# Patient Record
Sex: FEMALE | Race: WHITE | Employment: FULL TIME | URBAN - NONMETROPOLITAN AREA
[De-identification: names, ages, dates, MRNs, and addresses within clinical notes are randomized per-mention and may not be internally consistent; named-entity substitution may affect disease eponyms.]

---

## 2017-08-11 ENCOUNTER — HOSPITAL ENCOUNTER (OUTPATIENT)
Age: 42
Discharge: HOME | End: 2017-08-11
Payer: COMMERCIAL

## 2017-08-11 LAB
ALBUMIN SERPL-MCNC: 4.1 G/DL (ref 3.5–5.1)
ALP BLD-CCNC: 80 U/L (ref 38–126)
ALT SERPL-CCNC: 39 U/L (ref 11–66)
ANION GAP SERPL CALCULATED.3IONS-SCNC: 12 MEQ/L (ref 8–16)
AST SERPL-CCNC: 23 U/L (ref 5–40)
AVERAGE GLUCOSE: 129 MG/DL (ref 70–126)
BASOPHILS # BLD: 0.6 %
BASOPHILS ABSOLUTE: 0.1 THOU/MM3 (ref 0–0.1)
BILIRUB SERPL-MCNC: 0.5 MG/DL (ref 0.3–1.2)
BILIRUBIN DIRECT: < 0.2 MG/DL (ref 0–0.3)
BUN BLDV-MCNC: 14 MG/DL (ref 7–22)
CALCIUM SERPL-MCNC: 9 MG/DL (ref 8.5–10.5)
CHLORIDE BLD-SCNC: 101 MEQ/L (ref 98–111)
CHOLESTEROL, TOTAL: 230 MG/DL (ref 100–199)
CO2: 27 MEQ/L (ref 23–33)
CREAT SERPL-MCNC: 0.7 MG/DL (ref 0.4–1.2)
EOSINOPHIL # BLD: 1 %
EOSINOPHILS ABSOLUTE: 0.1 THOU/MM3 (ref 0–0.4)
GFR SERPL CREATININE-BSD FRML MDRD: > 90 ML/MIN/1.73M2
GLUCOSE BLD-MCNC: 141 MG/DL (ref 70–108)
HBA1C MFR BLD: 6.3 % (ref 4.4–6.4)
HCT VFR BLD CALC: 39.4 % (ref 37–47)
HDLC SERPL-MCNC: 40 MG/DL
HEMOGLOBIN: 13.6 GM/DL (ref 12–16)
LDL CHOLESTEROL CALCULATED: 128 MG/DL
LYMPHOCYTES # BLD: 33.2 %
LYMPHOCYTES ABSOLUTE: 3 THOU/MM3 (ref 1–4.8)
MCH RBC QN AUTO: 31 PG (ref 27–31)
MCHC RBC AUTO-ENTMCNC: 34.6 GM/DL (ref 33–37)
MCV RBC AUTO: 89.6 FL (ref 81–99)
MONOCYTES # BLD: 5.2 %
MONOCYTES ABSOLUTE: 0.5 THOU/MM3 (ref 0.4–1.3)
NUCLEATED RED BLOOD CELLS: 0 /100 WBC
PDW BLD-RTO: 12.2 % (ref 11.5–14.5)
PLATELET # BLD: 272 THOU/MM3 (ref 130–400)
PMV BLD AUTO: 10.1 MCM (ref 7.4–10.4)
POTASSIUM SERPL-SCNC: 4.2 MEQ/L (ref 3.5–5.2)
RBC # BLD: 4.4 MILL/MM3 (ref 4.2–5.4)
RBC # BLD: NORMAL 10*6/UL
SEG NEUTROPHILS: 60 %
SEGMENTED NEUTROPHILS ABSOLUTE COUNT: 5.3 THOU/MM3 (ref 1.8–7.7)
SODIUM BLD-SCNC: 140 MEQ/L (ref 135–145)
TOTAL PROTEIN: 7.4 G/DL (ref 6.1–8)
TRIGL SERPL-MCNC: 310 MG/DL (ref 0–199)
TSH SERPL DL<=0.05 MIU/L-ACNC: 3.1 UIU/ML (ref 0.4–4.2)
WBC # BLD: 8.9 THOU/MM3 (ref 4.8–10.8)

## 2017-08-11 PROCEDURE — 84443 ASSAY THYROID STIM HORMONE: CPT

## 2017-08-11 PROCEDURE — 36415 COLL VENOUS BLD VENIPUNCTURE: CPT

## 2017-08-11 PROCEDURE — 83036 HEMOGLOBIN GLYCOSYLATED A1C: CPT

## 2017-08-11 PROCEDURE — 82306 VITAMIN D 25 HYDROXY: CPT

## 2017-08-11 PROCEDURE — 80061 LIPID PANEL: CPT

## 2017-08-11 PROCEDURE — 80053 COMPREHEN METABOLIC PANEL: CPT

## 2017-08-11 PROCEDURE — 85025 COMPLETE CBC W/AUTO DIFF WBC: CPT

## 2017-08-11 PROCEDURE — 82248 BILIRUBIN DIRECT: CPT

## 2017-08-15 LAB — VITAMIN D2 AND D3, TOTAL: NORMAL

## 2017-12-02 ENCOUNTER — HOSPITAL ENCOUNTER (OUTPATIENT)
Age: 42
Discharge: HOME | End: 2017-12-02
Payer: COMMERCIAL

## 2017-12-02 LAB
BASOPHILS # BLD: 0.5 %
BASOPHILS ABSOLUTE: 0 THOU/MM3 (ref 0–0.1)
EKG ATRIAL RATE: 69 BPM
EKG P AXIS: 55 DEGREES
EKG P-R INTERVAL: 160 MS
EKG Q-T INTERVAL: 416 MS
EKG QRS DURATION: 82 MS
EKG QTC CALCULATION (BAZETT): 445 MS
EKG R AXIS: 74 DEGREES
EKG T AXIS: 22 DEGREES
EKG VENTRICULAR RATE: 69 BPM
EOSINOPHIL # BLD: 1.5 %
EOSINOPHILS ABSOLUTE: 0.1 THOU/MM3 (ref 0–0.4)
HCT VFR BLD CALC: 41.4 % (ref 37–47)
HEMOGLOBIN: 14.2 GM/DL (ref 12–16)
LYMPHOCYTES # BLD: 29.6 %
LYMPHOCYTES ABSOLUTE: 2.4 THOU/MM3 (ref 1–4.8)
MCH RBC QN AUTO: 30.9 PG (ref 27–31)
MCHC RBC AUTO-ENTMCNC: 34.3 GM/DL (ref 33–37)
MCV RBC AUTO: 90.1 FL (ref 81–99)
MONOCYTES # BLD: 5 %
MONOCYTES ABSOLUTE: 0.4 THOU/MM3 (ref 0.4–1.3)
NUCLEATED RED BLOOD CELLS: 0 /100 WBC
PDW BLD-RTO: 12.6 % (ref 11.5–14.5)
PLATELET # BLD: 270 THOU/MM3 (ref 130–400)
PMV BLD AUTO: 9.9 MCM (ref 7.4–10.4)
POTASSIUM SERPL-SCNC: 4.1 MEQ/L (ref 3.5–5.2)
RBC # BLD: 4.59 MILL/MM3 (ref 4.2–5.4)
SEG NEUTROPHILS: 63.4 %
SEGMENTED NEUTROPHILS ABSOLUTE COUNT: 5.1 THOU/MM3 (ref 1.8–7.7)
WBC # BLD: 8.1 THOU/MM3 (ref 4.8–10.8)

## 2017-12-02 PROCEDURE — 85025 COMPLETE CBC W/AUTO DIFF WBC: CPT

## 2017-12-02 PROCEDURE — 36415 COLL VENOUS BLD VENIPUNCTURE: CPT

## 2017-12-02 PROCEDURE — 93005 ELECTROCARDIOGRAM TRACING: CPT

## 2017-12-02 PROCEDURE — 84132 ASSAY OF SERUM POTASSIUM: CPT

## 2017-12-09 ENCOUNTER — HOSPITAL ENCOUNTER (EMERGENCY)
Age: 42
Discharge: HOME | End: 2017-12-09
Payer: COMMERCIAL

## 2017-12-09 VITALS
HEART RATE: 90 BPM | OXYGEN SATURATION: 97 % | SYSTOLIC BLOOD PRESSURE: 140 MMHG | DIASTOLIC BLOOD PRESSURE: 90 MMHG | HEIGHT: 65 IN | RESPIRATION RATE: 16 BRPM | WEIGHT: 293 LBS | TEMPERATURE: 97.9 F | BODY MASS INDEX: 48.82 KG/M2

## 2017-12-09 DIAGNOSIS — R31.9 HEMATURIA, UNSPECIFIED TYPE: ICD-10-CM

## 2017-12-09 DIAGNOSIS — R30.0 DYSURIA: Primary | ICD-10-CM

## 2017-12-09 LAB
BILIRUBIN URINE: NEGATIVE
BLOOD, URINE: ABNORMAL
CHARACTER, URINE: CLEAR
COLOR: ABNORMAL
GLUCOSE, URINE: NEGATIVE MG/DL
KETONES, URINE: NEGATIVE
LEUKOCYTES, UA: ABNORMAL
NITRATE, UA: NEGATIVE
PH UA: 6 (ref 5–9)
PROTEIN UA: NEGATIVE MG/DL
REFLEX TO URINE C & S: ABNORMAL
SPECIFIC GRAVITY UA: <= 1.005 (ref 1–1.03)
UROBILINOGEN, URINE: 0.2 EU/DL (ref 0–1)

## 2017-12-09 PROCEDURE — 99213 OFFICE O/P EST LOW 20 MIN: CPT | Performed by: NURSE PRACTITIONER

## 2017-12-09 PROCEDURE — 81003 URINALYSIS AUTO W/O SCOPE: CPT

## 2017-12-09 PROCEDURE — 99214 OFFICE O/P EST MOD 30 MIN: CPT

## 2017-12-09 PROCEDURE — 87086 URINE CULTURE/COLONY COUNT: CPT

## 2017-12-09 RX ORDER — NITROFURANTOIN 25; 75 MG/1; MG/1
100 CAPSULE ORAL 2 TIMES DAILY
Qty: 10 CAPSULE | Refills: 0 | Status: SHIPPED | OUTPATIENT
Start: 2017-12-09 | End: 2017-12-14

## 2017-12-09 ASSESSMENT — PAIN DESCRIPTION - ORIENTATION: ORIENTATION: LOWER

## 2017-12-09 ASSESSMENT — PAIN DESCRIPTION - PAIN TYPE: TYPE: ACUTE PAIN

## 2017-12-09 ASSESSMENT — ENCOUNTER SYMPTOMS
ABDOMINAL PAIN: 0
NAUSEA: 0
DIARRHEA: 0
COUGH: 0
VOMITING: 0

## 2017-12-09 ASSESSMENT — PAIN SCALES - GENERAL: PAINLEVEL_OUTOF10: 2

## 2017-12-09 ASSESSMENT — PAIN DESCRIPTION - LOCATION: LOCATION: ABDOMEN

## 2017-12-09 ASSESSMENT — PAIN DESCRIPTION - DESCRIPTORS: DESCRIPTORS: CRAMPING

## 2017-12-09 NOTE — ED PROVIDER NOTES
daily    ONDANSETRON (ZOFRAN ODT) 4 MG DISINTEGRATING TABLET    Take 1 tablet by mouth every 8 hours as needed for Nausea    OREGANO PO    Take by mouth       ALLERGIES     Patient is has No Known Allergies. FAMILY HISTORY     Patient's family history includes Heart Disease in her father. SOCIAL HISTORY     Patient  reports that she has never smoked. She has never used smokeless tobacco. She reports that she does not drink alcohol or use drugs. PHYSICAL EXAM     ED TRIAGE VITALS  BP: (!) 185/86, Temp: 97.9 °F (36.6 °C), Pulse: 85, Resp: 18, SpO2: 97 %  Physical Exam   Constitutional: She is oriented to person, place, and time. Vital signs are normal. She appears well-developed and well-nourished. Cardiovascular: Normal rate, regular rhythm and normal heart sounds. Pulmonary/Chest: Effort normal and breath sounds normal.   Abdominal: Soft. Normal appearance. There is no tenderness. There is no CVA tenderness. Neurological: She is alert and oriented to person, place, and time. Skin: Skin is warm and dry. Nursing note and vitals reviewed.       DIAGNOSTIC RESULTS   Labs:  Results for orders placed or performed during the hospital encounter of 12/09/17   UA without Microscopic Reflex C&S   Result Value Ref Range    Glucose, Urine Negative NEGATIVE mg/dl    Bilirubin Urine Negative NEGATIVE    Ketones, Urine Negative NEGATIVE    Specific Gravity, UA <=1.005 1.002 - 1.03    Blood, Urine Trace-lysed NEGATIVE    pH, UA 6.00 5.0 - 9.0    Protein, UA Negative NEGATIVE mg/dl    Urobilinogen, Urine 0.20 0.0 - 1.0 eu/dl    Nitrate, UA Negative NEGATIVE    LEUKOCYTES, UA Small (A) NEGATIVE    Color, UA STRAW STRAW-YELL    Character, Urine Clear CLEAR-SL C    REFLEX TO URINE C & S INDICATED        IMAGING:    URGENT CARE COURSE:     Vitals:    12/09/17 1753   BP: (!) 185/86   Pulse: 85   Resp: 18   Temp: 97.9 °F (36.6 °C)   TempSrc: Temporal   SpO2: 97%   Weight: (!) 336 lb (152.4 kg)   Height: 5' 5\" (1.651 m) Medications - No data to display  PROCEDURES:  None  FINAL IMPRESSION      1. Dysuria    2. Hematuria, unspecified type        DISPOSITION/PLAN   DISPOSITION     Patient will be discharged home. She is to increase fluids and rest.  Avoid caffeine. No bubble baths. Urinate after intercourse. Since patient has an upcoming surgery and is symptomatic for possible UTI, she will be started on Macrobid to be taken as prescribed. All lab results were reviewed in detail. Reviewed signs and symptoms that require immediate emergency room evaluation and treatment. Patient voiced understanding of all information and is agreeable to the treatment plan.     PATIENT REFERRED TO:  Julia Jaquez  [address] [place] [address]  [phone]    Call in 2 days  As needed, If symptoms worsen go to emergency room    DISCHARGE MEDICATIONS:  New Prescriptions    NITROFURANTOIN, MACROCRYSTAL-MONOHYDRATE, (MACROBID) 100 MG CAPSULE    Take 1 capsule by mouth 2 times daily for 5 days     Current Discharge Medication List          [place], [address]  12/09/17 1956

## 2017-12-10 LAB
ORGANISM: ABNORMAL
URINE CULTURE REFLEX: ABNORMAL

## 2017-12-11 RX ORDER — LOSARTAN POTASSIUM AND HYDROCHLOROTHIAZIDE 12.5; 5 MG/1; MG/1
1 TABLET ORAL DAILY
COMMUNITY
End: 2019-01-05 | Stop reason: ALTCHOICE

## 2017-12-11 NOTE — H&P
[address] [address]  History and Physical Update    Pt Name: Sergio Mann  MRN: [id number]  YOB: 1975  Date of evaluation: 12/11/2017    [x] I have examined the patient and reviewed the H&P/Consult and there are no changes to the patient or plans. [] I have examined the patient and reviewed the H&P/Consult and have noted the following changes: For scheduled inductions of labor, please refer to the scheduling sheet for any maternal and / or fetal indications for the induction. They are not being placed here to avoid possible discrepancies and duplications in the medical record. Thank you. This will be/was done the day of admission/surgery in the pre op holding area or in L and D as applicable to this patient.         Og Jules MD  Electronically signed 12/11/2017 at 8:46 AM

## 2017-12-12 ENCOUNTER — ANESTHESIA (OUTPATIENT)
Dept: OPERATING ROOM | Age: 42
End: 2017-12-12
Payer: COMMERCIAL

## 2017-12-12 ENCOUNTER — HOSPITAL ENCOUNTER (OUTPATIENT)
Age: 42
Setting detail: OUTPATIENT SURGERY
Discharge: HOME | End: 2017-12-12
Attending: OBSTETRICS & GYNECOLOGY | Admitting: OBSTETRICS & GYNECOLOGY
Payer: COMMERCIAL

## 2017-12-12 ENCOUNTER — ANESTHESIA EVENT (OUTPATIENT)
Dept: OPERATING ROOM | Age: 42
End: 2017-12-12
Payer: COMMERCIAL

## 2017-12-12 VITALS
SYSTOLIC BLOOD PRESSURE: 87 MMHG | OXYGEN SATURATION: 97 % | RESPIRATION RATE: 7 BRPM | TEMPERATURE: 14.4 F | DIASTOLIC BLOOD PRESSURE: 48 MMHG

## 2017-12-12 VITALS
TEMPERATURE: 97 F | WEIGHT: 293 LBS | BODY MASS INDEX: 47.09 KG/M2 | HEIGHT: 66 IN | OXYGEN SATURATION: 98 % | RESPIRATION RATE: 18 BRPM | DIASTOLIC BLOOD PRESSURE: 78 MMHG | HEART RATE: 66 BPM | SYSTOLIC BLOOD PRESSURE: 146 MMHG

## 2017-12-12 PROBLEM — N92.1 MENORRHAGIA WITH IRREGULAR CYCLE: Status: ACTIVE | Noted: 2017-12-12

## 2017-12-12 PROBLEM — N94.6 DYSMENORRHEA: Status: ACTIVE | Noted: 2017-12-12

## 2017-12-12 PROBLEM — E66.01 MORBID OBESITY DUE TO EXCESS CALORIES (HCC): Status: ACTIVE | Noted: 2017-12-12

## 2017-12-12 LAB
ABO: NORMAL
ANTIBODY SCREEN: NORMAL
RH FACTOR: NORMAL

## 2017-12-12 PROCEDURE — A4649 SURGICAL SUPPLIES: HCPCS | Performed by: OBSTETRICS & GYNECOLOGY

## 2017-12-12 PROCEDURE — 6370000000 HC RX 637 (ALT 250 FOR IP)

## 2017-12-12 PROCEDURE — 3700000001 HC ADD 15 MINUTES (ANESTHESIA): Performed by: OBSTETRICS & GYNECOLOGY

## 2017-12-12 PROCEDURE — 88307 TISSUE EXAM BY PATHOLOGIST: CPT

## 2017-12-12 PROCEDURE — 2580000003 HC RX 258: Performed by: OBSTETRICS & GYNECOLOGY

## 2017-12-12 PROCEDURE — 2500000003 HC RX 250 WO HCPCS: Performed by: OBSTETRICS & GYNECOLOGY

## 2017-12-12 PROCEDURE — 3600000009 HC SURGERY ROBOT BASE: Performed by: OBSTETRICS & GYNECOLOGY

## 2017-12-12 PROCEDURE — A6413 ADHESIVE BANDAGE, FIRST-AID: HCPCS | Performed by: OBSTETRICS & GYNECOLOGY

## 2017-12-12 PROCEDURE — 6360000002 HC RX W HCPCS: Performed by: OBSTETRICS & GYNECOLOGY

## 2017-12-12 PROCEDURE — 2780000010 HC IMPLANT OTHER: Performed by: OBSTETRICS & GYNECOLOGY

## 2017-12-12 PROCEDURE — 6360000002 HC RX W HCPCS: Performed by: ANESTHESIOLOGY

## 2017-12-12 PROCEDURE — 7100000001 HC PACU RECOVERY - ADDTL 15 MIN: Performed by: OBSTETRICS & GYNECOLOGY

## 2017-12-12 PROCEDURE — 2500000003 HC RX 250 WO HCPCS: Performed by: ANESTHESIOLOGY

## 2017-12-12 PROCEDURE — 7100000000 HC PACU RECOVERY - FIRST 15 MIN: Performed by: OBSTETRICS & GYNECOLOGY

## 2017-12-12 PROCEDURE — 3600000019 HC SURGERY ROBOT ADDTL 15MIN: Performed by: OBSTETRICS & GYNECOLOGY

## 2017-12-12 PROCEDURE — 7100000011 HC PHASE II RECOVERY - ADDTL 15 MIN: Performed by: OBSTETRICS & GYNECOLOGY

## 2017-12-12 PROCEDURE — 86900 BLOOD TYPING SEROLOGIC ABO: CPT

## 2017-12-12 PROCEDURE — A4450 NON-WATERPROOF TAPE: HCPCS | Performed by: OBSTETRICS & GYNECOLOGY

## 2017-12-12 PROCEDURE — 3700000000 HC ANESTHESIA ATTENDED CARE: Performed by: OBSTETRICS & GYNECOLOGY

## 2017-12-12 PROCEDURE — 36415 COLL VENOUS BLD VENIPUNCTURE: CPT

## 2017-12-12 PROCEDURE — S2900 ROBOTIC SURGICAL SYSTEM: HCPCS | Performed by: OBSTETRICS & GYNECOLOGY

## 2017-12-12 PROCEDURE — 86850 RBC ANTIBODY SCREEN: CPT

## 2017-12-12 PROCEDURE — 6370000000 HC RX 637 (ALT 250 FOR IP): Performed by: OBSTETRICS & GYNECOLOGY

## 2017-12-12 PROCEDURE — 7100000010 HC PHASE II RECOVERY - FIRST 15 MIN: Performed by: OBSTETRICS & GYNECOLOGY

## 2017-12-12 PROCEDURE — 86901 BLOOD TYPING SEROLOGIC RH(D): CPT

## 2017-12-12 RX ORDER — NEOSTIGMINE METHYLSULFATE 1 MG/ML
INJECTION, SOLUTION INTRAVENOUS PRN
Status: DISCONTINUED | OUTPATIENT
Start: 2017-12-12 | End: 2017-12-12 | Stop reason: SDUPTHER

## 2017-12-12 RX ORDER — ROCURONIUM BROMIDE 10 MG/ML
INJECTION, SOLUTION INTRAVENOUS PRN
Status: DISCONTINUED | OUTPATIENT
Start: 2017-12-12 | End: 2017-12-12 | Stop reason: SDUPTHER

## 2017-12-12 RX ORDER — PROPOFOL 10 MG/ML
INJECTION, EMULSION INTRAVENOUS PRN
Status: DISCONTINUED | OUTPATIENT
Start: 2017-12-12 | End: 2017-12-12 | Stop reason: SDUPTHER

## 2017-12-12 RX ORDER — OXYCODONE HYDROCHLORIDE AND ACETAMINOPHEN 5; 325 MG/1; MG/1
TABLET ORAL
Status: DISCONTINUED
Start: 2017-12-12 | End: 2017-12-12 | Stop reason: HOSPADM

## 2017-12-12 RX ORDER — SIMETHICONE 80 MG
80 TABLET,CHEWABLE ORAL EVERY 6 HOURS PRN
Status: DISCONTINUED | OUTPATIENT
Start: 2017-12-12 | End: 2017-12-12 | Stop reason: HOSPADM

## 2017-12-12 RX ORDER — OXYCODONE HYDROCHLORIDE AND ACETAMINOPHEN 5; 325 MG/1; MG/1
1-2 TABLET ORAL EVERY 4 HOURS PRN
Qty: 28 TABLET | Refills: 0 | Status: SHIPPED | OUTPATIENT
Start: 2017-12-12 | End: 2017-12-19

## 2017-12-12 RX ORDER — SCOLOPAMINE TRANSDERMAL SYSTEM 1 MG/1
PATCH, EXTENDED RELEASE TRANSDERMAL
Status: DISCONTINUED
Start: 2017-12-12 | End: 2017-12-12 | Stop reason: HOSPADM

## 2017-12-12 RX ORDER — ONDANSETRON 2 MG/ML
4 INJECTION INTRAMUSCULAR; INTRAVENOUS
Status: COMPLETED | OUTPATIENT
Start: 2017-12-12 | End: 2017-12-12

## 2017-12-12 RX ORDER — SODIUM CHLORIDE 0.9 % (FLUSH) 0.9 %
10 SYRINGE (ML) INJECTION PRN
Status: DISCONTINUED | OUTPATIENT
Start: 2017-12-12 | End: 2017-12-12 | Stop reason: HOSPADM

## 2017-12-12 RX ORDER — ACETAMINOPHEN, ASPIRIN AND CAFFEINE 250; 250; 65 MG/1; MG/1; MG/1
2 TABLET, FILM COATED ORAL EVERY 6 HOURS PRN
COMMUNITY
End: 2019-07-12

## 2017-12-12 RX ORDER — HYDRALAZINE HYDROCHLORIDE 20 MG/ML
5 INJECTION INTRAMUSCULAR; INTRAVENOUS EVERY 10 MIN PRN
Status: DISCONTINUED | OUTPATIENT
Start: 2017-12-12 | End: 2017-12-12 | Stop reason: HOSPADM

## 2017-12-12 RX ORDER — SODIUM CHLORIDE 0.9 % (FLUSH) 0.9 %
10 SYRINGE (ML) INJECTION EVERY 12 HOURS SCHEDULED
Status: DISCONTINUED | OUTPATIENT
Start: 2017-12-12 | End: 2017-12-12 | Stop reason: HOSPADM

## 2017-12-12 RX ORDER — BISACODYL 10 MG
10 SUPPOSITORY, RECTAL RECTAL DAILY
Status: DISCONTINUED | OUTPATIENT
Start: 2017-12-13 | End: 2017-12-12 | Stop reason: HOSPADM

## 2017-12-12 RX ORDER — SODIUM CHLORIDE, SODIUM LACTATE, POTASSIUM CHLORIDE, CALCIUM CHLORIDE 600; 310; 30; 20 MG/100ML; MG/100ML; MG/100ML; MG/100ML
INJECTION, SOLUTION INTRAVENOUS CONTINUOUS
Status: DISCONTINUED | OUTPATIENT
Start: 2017-12-12 | End: 2017-12-12 | Stop reason: HOSPADM

## 2017-12-12 RX ORDER — MORPHINE SULFATE 2 MG/ML
2 INJECTION, SOLUTION INTRAMUSCULAR; INTRAVENOUS EVERY 5 MIN PRN
Status: DISCONTINUED | OUTPATIENT
Start: 2017-12-12 | End: 2017-12-12 | Stop reason: HOSPADM

## 2017-12-12 RX ORDER — DOCUSATE SODIUM 100 MG/1
100 CAPSULE, LIQUID FILLED ORAL 2 TIMES DAILY
Status: DISCONTINUED | OUTPATIENT
Start: 2017-12-12 | End: 2017-12-12 | Stop reason: HOSPADM

## 2017-12-12 RX ORDER — OXYCODONE HYDROCHLORIDE AND ACETAMINOPHEN 5; 325 MG/1; MG/1
1 TABLET ORAL EVERY 4 HOURS PRN
Status: DISCONTINUED | OUTPATIENT
Start: 2017-12-12 | End: 2017-12-12 | Stop reason: HOSPADM

## 2017-12-12 RX ORDER — MEPERIDINE HYDROCHLORIDE 25 MG/ML
12.5 INJECTION INTRAMUSCULAR; INTRAVENOUS; SUBCUTANEOUS EVERY 5 MIN PRN
Status: DISCONTINUED | OUTPATIENT
Start: 2017-12-12 | End: 2017-12-12 | Stop reason: HOSPADM

## 2017-12-12 RX ORDER — ONDANSETRON 2 MG/ML
4 INJECTION INTRAMUSCULAR; INTRAVENOUS EVERY 6 HOURS PRN
Status: DISCONTINUED | OUTPATIENT
Start: 2017-12-12 | End: 2017-12-12 | Stop reason: HOSPADM

## 2017-12-12 RX ORDER — LABETALOL HYDROCHLORIDE 5 MG/ML
5 INJECTION, SOLUTION INTRAVENOUS EVERY 5 MIN PRN
Status: DISCONTINUED | OUTPATIENT
Start: 2017-12-12 | End: 2017-12-12 | Stop reason: HOSPADM

## 2017-12-12 RX ORDER — ACETAMINOPHEN 325 MG/1
650 TABLET ORAL EVERY 4 HOURS PRN
Status: DISCONTINUED | OUTPATIENT
Start: 2017-12-12 | End: 2017-12-12 | Stop reason: HOSPADM

## 2017-12-12 RX ORDER — MIDAZOLAM HYDROCHLORIDE 1 MG/ML
INJECTION INTRAMUSCULAR; INTRAVENOUS PRN
Status: DISCONTINUED | OUTPATIENT
Start: 2017-12-12 | End: 2017-12-12 | Stop reason: SDUPTHER

## 2017-12-12 RX ORDER — GLYCOPYRROLATE 0.2 MG/ML
INJECTION INTRAMUSCULAR; INTRAVENOUS PRN
Status: DISCONTINUED | OUTPATIENT
Start: 2017-12-12 | End: 2017-12-12 | Stop reason: SDUPTHER

## 2017-12-12 RX ORDER — SCOLOPAMINE TRANSDERMAL SYSTEM 1 MG/1
1 PATCH, EXTENDED RELEASE TRANSDERMAL
Status: DISCONTINUED | OUTPATIENT
Start: 2017-12-12 | End: 2017-12-12 | Stop reason: HOSPADM

## 2017-12-12 RX ORDER — FENTANYL CITRATE 50 UG/ML
50 INJECTION, SOLUTION INTRAMUSCULAR; INTRAVENOUS EVERY 5 MIN PRN
Status: COMPLETED | OUTPATIENT
Start: 2017-12-12 | End: 2017-12-12

## 2017-12-12 RX ORDER — OXYCODONE HYDROCHLORIDE AND ACETAMINOPHEN 5; 325 MG/1; MG/1
2 TABLET ORAL EVERY 4 HOURS PRN
Status: DISCONTINUED | OUTPATIENT
Start: 2017-12-12 | End: 2017-12-12 | Stop reason: HOSPADM

## 2017-12-12 RX ORDER — CEPHALEXIN 500 MG/1
500 CAPSULE ORAL 2 TIMES DAILY
Qty: 4 CAPSULE | Refills: 0 | Status: SHIPPED | OUTPATIENT
Start: 2017-12-12 | End: 2017-12-14

## 2017-12-12 RX ORDER — SENNA AND DOCUSATE SODIUM 50; 8.6 MG/1; MG/1
1 TABLET, FILM COATED ORAL DAILY
Status: DISCONTINUED | OUTPATIENT
Start: 2017-12-12 | End: 2017-12-12 | Stop reason: HOSPADM

## 2017-12-12 RX ORDER — DEXAMETHASONE SODIUM PHOSPHATE 4 MG/ML
INJECTION, SOLUTION INTRA-ARTICULAR; INTRALESIONAL; INTRAMUSCULAR; INTRAVENOUS; SOFT TISSUE PRN
Status: DISCONTINUED | OUTPATIENT
Start: 2017-12-12 | End: 2017-12-12 | Stop reason: SDUPTHER

## 2017-12-12 RX ORDER — DIPHENHYDRAMINE HYDROCHLORIDE 50 MG/ML
12.5 INJECTION INTRAMUSCULAR; INTRAVENOUS
Status: DISCONTINUED | OUTPATIENT
Start: 2017-12-12 | End: 2017-12-12 | Stop reason: HOSPADM

## 2017-12-12 RX ORDER — BUPIVACAINE HYDROCHLORIDE 5 MG/ML
INJECTION, SOLUTION EPIDURAL; INTRACAUDAL PRN
Status: DISCONTINUED | OUTPATIENT
Start: 2017-12-12 | End: 2017-12-12 | Stop reason: HOSPADM

## 2017-12-12 RX ORDER — KETOROLAC TROMETHAMINE 30 MG/ML
30 INJECTION, SOLUTION INTRAMUSCULAR; INTRAVENOUS EVERY 6 HOURS PRN
Status: DISCONTINUED | OUTPATIENT
Start: 2017-12-12 | End: 2017-12-12 | Stop reason: HOSPADM

## 2017-12-12 RX ADMIN — SODIUM CHLORIDE, POTASSIUM CHLORIDE, SODIUM LACTATE AND CALCIUM CHLORIDE: 600; 310; 30; 20 INJECTION, SOLUTION INTRAVENOUS at 08:38

## 2017-12-12 RX ADMIN — Medication 50 MG: at 09:15

## 2017-12-12 RX ADMIN — FENTANYL CITRATE 100 MCG: 50 INJECTION, SOLUTION INTRAMUSCULAR; INTRAVENOUS at 08:38

## 2017-12-12 RX ADMIN — GLYCOPYRROLATE 0.4 MG: 0.2 INJECTION, SOLUTION INTRAMUSCULAR; INTRAVENOUS at 11:05

## 2017-12-12 RX ADMIN — SODIUM CHLORIDE, POTASSIUM CHLORIDE, SODIUM LACTATE AND CALCIUM CHLORIDE: 600; 310; 30; 20 INJECTION, SOLUTION INTRAVENOUS at 07:29

## 2017-12-12 RX ADMIN — Medication 50 MG: at 08:52

## 2017-12-12 RX ADMIN — NEOSTIGMINE METHYLSULFATE 2 MG: 1 INJECTION, SOLUTION INTRAVENOUS at 11:15

## 2017-12-12 RX ADMIN — MIDAZOLAM HYDROCHLORIDE 2 MG: 1 INJECTION, SOLUTION INTRAMUSCULAR; INTRAVENOUS at 08:38

## 2017-12-12 RX ADMIN — DEXAMETHASONE SODIUM PHOSPHATE 8 MG: 4 INJECTION, SOLUTION INTRAMUSCULAR; INTRAVENOUS at 09:24

## 2017-12-12 RX ADMIN — CEFAZOLIN SODIUM 3 G: 2 SOLUTION INTRAVENOUS at 08:44

## 2017-12-12 RX ADMIN — ONDANSETRON 4 MG: 2 INJECTION INTRAMUSCULAR; INTRAVENOUS at 09:24

## 2017-12-12 RX ADMIN — FENTANYL CITRATE 50 MCG: 50 INJECTION, SOLUTION INTRAMUSCULAR; INTRAVENOUS at 10:45

## 2017-12-12 RX ADMIN — Medication 20 MG: at 10:20

## 2017-12-12 RX ADMIN — FENTANYL CITRATE 50 MCG: 50 INJECTION, SOLUTION INTRAMUSCULAR; INTRAVENOUS at 11:05

## 2017-12-12 RX ADMIN — OXYCODONE HYDROCHLORIDE AND ACETAMINOPHEN 1 TABLET: 5; 325 TABLET ORAL at 14:02

## 2017-12-12 RX ADMIN — NEOSTIGMINE METHYLSULFATE 3 MG: 1 INJECTION, SOLUTION INTRAVENOUS at 11:05

## 2017-12-12 RX ADMIN — LABETALOL HYDROCHLORIDE 5 MG: 5 INJECTION INTRAVENOUS at 11:58

## 2017-12-12 RX ADMIN — ONDANSETRON 4 MG: 2 INJECTION INTRAMUSCULAR; INTRAVENOUS at 11:06

## 2017-12-12 RX ADMIN — HYDROMORPHONE HYDROCHLORIDE 2 MG: 1 INJECTION, SOLUTION INTRAMUSCULAR; INTRAVENOUS; SUBCUTANEOUS at 09:21

## 2017-12-12 RX ADMIN — FENTANYL CITRATE 50 MCG: 50 INJECTION, SOLUTION INTRAMUSCULAR; INTRAVENOUS at 11:10

## 2017-12-12 RX ADMIN — LABETALOL HYDROCHLORIDE 5 MG: 5 INJECTION INTRAVENOUS at 12:08

## 2017-12-12 RX ADMIN — PROPOFOL 180 MG: 10 INJECTION, EMULSION INTRAVENOUS at 08:52

## 2017-12-12 ASSESSMENT — PULMONARY FUNCTION TESTS
PIF_VALUE: 24
PIF_VALUE: 4
PIF_VALUE: 23
PIF_VALUE: 10
PIF_VALUE: 0
PIF_VALUE: 0
PIF_VALUE: 24
PIF_VALUE: 33
PIF_VALUE: 10
PIF_VALUE: 33
PIF_VALUE: 29
PIF_VALUE: 4
PIF_VALUE: 3
PIF_VALUE: 32
PIF_VALUE: 33
PIF_VALUE: 32
PIF_VALUE: 23
PIF_VALUE: 2
PIF_VALUE: 31
PIF_VALUE: 32
PIF_VALUE: 25
PIF_VALUE: 33
PIF_VALUE: 32
PIF_VALUE: 3
PIF_VALUE: 21
PIF_VALUE: 32
PIF_VALUE: 4
PIF_VALUE: 12
PIF_VALUE: 0
PIF_VALUE: 23
PIF_VALUE: 3
PIF_VALUE: 24
PIF_VALUE: 31
PIF_VALUE: 21
PIF_VALUE: 0
PIF_VALUE: 32
PIF_VALUE: 33
PIF_VALUE: 23
PIF_VALUE: 30
PIF_VALUE: 32
PIF_VALUE: 32
PIF_VALUE: 0
PIF_VALUE: 32
PIF_VALUE: 33
PIF_VALUE: 31
PIF_VALUE: 33
PIF_VALUE: 31
PIF_VALUE: 28
PIF_VALUE: 30
PIF_VALUE: 32
PIF_VALUE: 33
PIF_VALUE: 20
PIF_VALUE: 28
PIF_VALUE: 31
PIF_VALUE: 32
PIF_VALUE: 21
PIF_VALUE: 33
PIF_VALUE: 12
PIF_VALUE: 31
PIF_VALUE: 3
PIF_VALUE: 30
PIF_VALUE: 27
PIF_VALUE: 32
PIF_VALUE: 31
PIF_VALUE: 2
PIF_VALUE: 28
PIF_VALUE: 32
PIF_VALUE: 31
PIF_VALUE: 26
PIF_VALUE: 1
PIF_VALUE: 20
PIF_VALUE: 32
PIF_VALUE: 23
PIF_VALUE: 3
PIF_VALUE: 33
PIF_VALUE: 32
PIF_VALUE: 36
PIF_VALUE: 32
PIF_VALUE: 24
PIF_VALUE: 32
PIF_VALUE: 30
PIF_VALUE: 32
PIF_VALUE: 2
PIF_VALUE: 31
PIF_VALUE: 3
PIF_VALUE: 32
PIF_VALUE: 33
PIF_VALUE: 33
PIF_VALUE: 29
PIF_VALUE: 13
PIF_VALUE: 34
PIF_VALUE: 31
PIF_VALUE: 27
PIF_VALUE: 23
PIF_VALUE: 2
PIF_VALUE: 24
PIF_VALUE: 5
PIF_VALUE: 33
PIF_VALUE: 32
PIF_VALUE: 32
PIF_VALUE: 31
PIF_VALUE: 3
PIF_VALUE: 32
PIF_VALUE: 32
PIF_VALUE: 20
PIF_VALUE: 32
PIF_VALUE: 3
PIF_VALUE: 3
PIF_VALUE: 25
PIF_VALUE: 31
PIF_VALUE: 12
PIF_VALUE: 31
PIF_VALUE: 32
PIF_VALUE: 31
PIF_VALUE: 32
PIF_VALUE: 31
PIF_VALUE: 31
PIF_VALUE: 3
PIF_VALUE: 34
PIF_VALUE: 32
PIF_VALUE: 21
PIF_VALUE: 33
PIF_VALUE: 3
PIF_VALUE: 32
PIF_VALUE: 33
PIF_VALUE: 29
PIF_VALUE: 33
PIF_VALUE: 32
PIF_VALUE: 4
PIF_VALUE: 0
PIF_VALUE: 32
PIF_VALUE: 5
PIF_VALUE: 3
PIF_VALUE: 33
PIF_VALUE: 1
PIF_VALUE: 24
PIF_VALUE: 25
PIF_VALUE: 0
PIF_VALUE: 32
PIF_VALUE: 22
PIF_VALUE: 32
PIF_VALUE: 33
PIF_VALUE: 32
PIF_VALUE: 29
PIF_VALUE: 0
PIF_VALUE: 33
PIF_VALUE: 3
PIF_VALUE: 32
PIF_VALUE: 33
PIF_VALUE: 24
PIF_VALUE: 32
PIF_VALUE: 27
PIF_VALUE: 20
PIF_VALUE: 18
PIF_VALUE: 29
PIF_VALUE: 0
PIF_VALUE: 0
PIF_VALUE: 33
PIF_VALUE: 3
PIF_VALUE: 32
PIF_VALUE: 3
PIF_VALUE: 28

## 2017-12-12 ASSESSMENT — PAIN SCALES - GENERAL
PAINLEVEL_OUTOF10: 4

## 2017-12-12 NOTE — H&P
[address] [place] [address]                         PREOPERATIVE HISTORY AND PHYSICAL    PATIENT NAME: Kaela Robb                  :        1975  MED REC NO:   [id number]                           ROOM:       01  ACCOUNT NO:   [de-identified]                           ADMIT DATE: 2017  PROVIDER:     Christin Dakins, M.D. Rueben Lipschutz OF SURGERY:  2017. HISTORY OF PRESENT ILLNESS:  The patient is a 33-year-old,  3, para  3, AB 0 with three living children, who presents with severe dysmenorrhea  and persistent menorrhagia. 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 She has failed  non-steroidal anti-inflammatories. Treatment options were discussed at  length and she now presents for robotic-assisted total laparoscopic  hysterectomy and bilateral residual salpingectomy, not oophorectomy. PAST MEDICAL HISTORY:  Major illnesses, morbid obesity. PAST SURGICAL HISTORY:  Cholecystectomy, benign cyst removal in the remote  past, hysteroscopy, D and C, Novasure ablation, tubal ligation, and vocal  cord surgery. CURRENT MEDICATIONS:  Fluoxetine and losartan. ALLERGIES:  None known. FAMILY HISTORY:  Positive for breast cancer and heart disease. SOCIAL HISTORY:  She is . Denies use of tobacco, drugs or excessive  use of alcohol. REVIEW OF SYSTEMS:  Positive for morbid obesity with BMI 54. PHYSICAL EXAMINATION:  GENERAL:  A well developed, well nourished obese female, in no acute  distress. HEENT:  Normal.  LUNGS:  Clear. CARDIOVASCULAR:  Regular rate and rhythm without significant murmurs,  chills, heaves or rubs. ABDOMEN:  Obese and soft without hepatosplenomegaly. PELVIC:  Exam is negative for mass. EXTREMITIES:  Normal.  NEUROLOGIC:  Exam is physiologic.     IMPRESSION:  Post ablation severe dysmenorrhea that has not responded to  conservative medical therapy. PLAN:  Robotic-assisted total laparoscopic hysterectomy, bilateral residual  salpingectomy, not oophorectomy. Raad Rankin M.D.    D: 12/11/2017 15:41:44       T: 12/12/2017 3:37:28     [email or web address]  Job#: [id number]     Doc#: [id number]    CC:   Ana Saba M.D.

## 2017-12-12 NOTE — PROGRESS NOTES
Pt is asking for pain medication. No drainage from abdominal incision sites  No drainage from vaginal area.

## 2017-12-12 NOTE — PROGRESS NOTES
Scant vaginal drainage  No drainage from 4 incision sites on abdomen. Pt asking for seirra mist and crackers.

## 2017-12-12 NOTE — PROGRESS NOTES
Discharge criteria was met. Patient stated understanding discharge instructions. prescriptions given to patient. Left via Wheelchair. Assessment of Surgical site no drainage from incision site.

## 2017-12-12 NOTE — H&P
responded to  conservative medical therapy. PLAN:  Robotic-assisted total laparoscopic hysterectomy, bilateral residual  salpingectomy, not oophorectomy. Yessica Madera M.D.    D: 12/11/2017 15:41:44       T: 12/12/2017 3:12:29     [email or web address]  Job#: [id number]    Doc#: [id number]    CC:   Earlene Hauser M.D.

## 2017-12-12 NOTE — ANESTHESIA PRE PROCEDURE
Department of Anesthesiology  Preprocedure Note       Name:  Deniz Salas   Age:  38 y.o.  :  1975                                          MRN:  [id number]         Date:  2017      Surgeon: Winston Pineda): Ana Saba MD    Procedure: Procedure(s):  ROBOTIC TOTAL HYSTERECTOMY WITH BILATERAL SALPINGECTOMY    Medications prior to admission:   Prior to Admission medications    Medication Sig Start Date End Date Taking? Authorizing Provider   aspirin-acetaminophen-caffeine (EXCEDRIN MIGRAINE) 970-406-12 MG per tablet Take 2 tablets by mouth every 6 hours as needed for Headaches   Yes Historical Provider, MD   losartan-hydrochlorothiazide (HYZAAR) 50-12.5 MG per tablet Take 1 tablet by mouth daily   Yes Historical Provider, MD   nitrofurantoin, macrocrystal-monohydrate, (MACROBID) 100 MG capsule Take 1 capsule by mouth 2 times daily for 5 days 17 Yes Geneva Pisano CNP   FLUoxetine (PROZAC) 20 MG capsule Take 20 mg by mouth daily.    Yes Historical Provider, MD   naproxen (NAPROSYN) 500 MG tablet Take 1 tablet by mouth 2 times daily  Patient taking differently: Take 500 mg by mouth 2 times daily as needed  17   NADEEM Mishra   ondansetron (ZOFRAN ODT) 4 MG disintegrating tablet Take 1 tablet by mouth every 8 hours as needed for Nausea 17   NADEEM Mishra       Current medications:    Current Facility-Administered Medications   Medication Dose Route Frequency Provider Last Rate Last Dose    lactated ringers infusion   Intravenous Continuous Ana Saba  mL/hr at 17 0729      sodium chloride flush 0.9 % injection 10 mL  10 mL Intravenous 2 times per day Ana Saba MD        sodium chloride flush 0.9 % injection 10 mL  10 mL Intravenous PRN Ana Saba MD        scopolamine (TRANSDERM-SCOP) transdermal patch 1 patch  1 patch Transdermal Q72H Anette Arevalo MD   1 patch at 17 0750    ceFAZolin (ANCEF) 2 g in dextrose 3 %  08/11/2017    CO2 27 08/11/2017    BUN 14 08/11/2017    CREATININE 0.7 08/11/2017    LABGLOM >90 08/11/2017    GLUCOSE 141 08/11/2017    PROT 7.4 08/11/2017    CALCIUM 9.0 08/11/2017    BILITOT 0.5 08/11/2017    ALKPHOS 80 08/11/2017    AST 23 08/11/2017    ALT 39 08/11/2017       POC Tests: No results for input(s): POCGLU, POCNA, POCK, POCCL, POCBUN, POCHEMO, POCHCT in the last 72 hours. Coags: No results found for: PROTIME, INR, APTT    HCG (If Applicable):   Lab Results   Component Value Date    PREGSERUM NEGATIVE 06/20/2017        ABGs: No results found for: PHART, PO2ART, SZK5PYD, EKA9QPI, BEART, J4TNNYTK     Type & Screen (If Applicable):  No results found for: LABABO, LABRH    Anesthesia Evaluation     history of anesthetic complications: PONV. Airway: Mallampati: II       Mouth opening: > = 3 FB Dental:          Pulmonary:   (+) sleep apnea:      (-) COPD                           Cardiovascular:    (+) hypertension:,     (-) CAD    ECG reviewed  Rhythm: regular                      Neuro/Psych:      (-) CVA           GI/Hepatic/Renal:             Endo/Other:                     Abdominal:   (+) obese,         Vascular:                                        Anesthesia Plan      general     ASA 2       Induction: intravenous. MIPS: Postoperative opioids intended. Anesthetic plan and risks discussed with patient.                       Nehemiah Quiñones MD   12/12/2017

## 2017-12-12 NOTE — BRIEF OP NOTE
OB/Gyn Service   Brief Operative Report      Pre-operative Diagnosis:  Dysmenorrhea, menorrhagia, morbid obesity    Post-operative Diagnosis:  Same, plus bilateral ovarian cysts    Procedure:  RATLHBS, ASP OVARIAN CYSTS BILATERLALLY    Surgeon:  Madisyn Castro     Anesthesia:  REGIONAL    Estimated blood loss:  100 ML     Findings:  POST OP DX    Complications:  NONE      See dictated operative report for full details.

## 2017-12-13 NOTE — OP NOTE
[address] [place] [address]                                 OPERATIVE REPORT    PATIENT NAME: Merlyn Malik                  :        1975  MED REC NO:   [id number]                           ROOM:  ACCOUNT NO:   [de-identified]                           ADMIT DATE: 2017  PROVIDER:     Maryam Mcelroy M.D.    DATE OF PROCEDURE:  2017    PREOPERATIVE DIAGNOSES:  Post ablation, severe dysmenorrhea,  menometrorrhagia, morbid obesity. POSTOPERATIVE DIAGNOSES:  Post ablation, severe dysmenorrhea,  menometrorrhagia, morbid obesity, bilateral benign follicular cyst.    OPERATION PERFORMED:  Robotic assisted total laparoscopic hysterectomy,  bilateral residual salpingectomy, aspiration, and bilateral ovarian cyst.    SURGEON:  Maryam Mcelroy M.D. ANESTHESIA:  _____. TYPE OF ANESTHESIA:  General endotracheal.    ESTIMATED BLOOD LOSS:  100 mL. COMPLICATIONS:  None. POSTOP CONDITION:  Good. NARRATIVE SUMMARY:  The patient was taken to the operating room, placed on  the operating table, and adequate level of general endotracheal anesthetic  was administered. She was placed in modified dorsal lithotomy position  with the Royce stirrups and the abdomen, perineum, and vagina all prepped  and draped in usual manner. Exam under anesthesia was unremarkable. A  weighted speculum was placed in the vagina. Cervix was visualized and the  anterior lip was grasped with a single tooth tenaculum. Uterus sounded,  cervix dilated, and the VCare uterine manipulator with a large forniceal  cup was placed transcervically. Initially, it was difficult to reach the  fundus, but we did make an adjustment intraoperatively and did reach the  uterine fundus for uterine manipulation. The forniceal cup was then  secured tightly into place with the aid of vaginal occluder.     Under sterile conditions, a Cade catheter was confirmed. The Cade catheter was draining clear yellow urine. At this point, Leoncio  hemostatic powder was placed throughout the pelvis. All laparoscopic  instruments were then removed, the robot was undocked. The patient was  again placed supine. Trocars removed. Deep suture placed at the fascia,  at the 2 larger ports and then all skin incisions were closed with 4-0  white Vicryl subcuticular suture and injected with 0.5% Marcaine. Vaginal  instruments were removed, minimal bleeding present. Cade again was  draining clear yellow urine at the end of the procedure. The patient was  awakened, extubated, and taken to recovery in good condition.         Bhavesh Bañuelos M.D.    D: 12/12/2017 11:19:16       T: 12/12/2017 15:22:48     [email or web address]  Job#: [id number]     Doc#: [id number]    CC:

## 2017-12-30 ENCOUNTER — APPOINTMENT (OUTPATIENT)
Dept: CT IMAGING | Age: 42
End: 2017-12-30
Payer: COMMERCIAL

## 2017-12-30 ENCOUNTER — APPOINTMENT (OUTPATIENT)
Dept: GENERAL RADIOLOGY | Age: 42
End: 2017-12-30
Payer: COMMERCIAL

## 2017-12-30 ENCOUNTER — HOSPITAL ENCOUNTER (EMERGENCY)
Age: 42
Discharge: HOME | End: 2017-12-30
Attending: EMERGENCY MEDICINE
Payer: COMMERCIAL

## 2017-12-30 VITALS
RESPIRATION RATE: 16 BRPM | BODY MASS INDEX: 47.09 KG/M2 | WEIGHT: 293 LBS | OXYGEN SATURATION: 98 % | HEART RATE: 89 BPM | TEMPERATURE: 97.2 F | HEIGHT: 66 IN | DIASTOLIC BLOOD PRESSURE: 69 MMHG | SYSTOLIC BLOOD PRESSURE: 130 MMHG

## 2017-12-30 DIAGNOSIS — N39.0 URINARY TRACT INFECTION WITH HEMATURIA, SITE UNSPECIFIED: ICD-10-CM

## 2017-12-30 DIAGNOSIS — R30.0 DYSURIA: ICD-10-CM

## 2017-12-30 DIAGNOSIS — R31.9 URINARY TRACT INFECTION WITH HEMATURIA, SITE UNSPECIFIED: ICD-10-CM

## 2017-12-30 DIAGNOSIS — R10.84 GENERALIZED ABDOMINAL PAIN: Primary | ICD-10-CM

## 2017-12-30 LAB
ALBUMIN SERPL-MCNC: 4.3 G/DL (ref 3.5–5.1)
ALP BLD-CCNC: 112 U/L (ref 38–126)
ALT SERPL-CCNC: 101 U/L (ref 11–66)
ANION GAP SERPL CALCULATED.3IONS-SCNC: 14 MEQ/L (ref 8–16)
AST SERPL-CCNC: 55 U/L (ref 5–40)
BACTERIA: ABNORMAL
BASOPHILS # BLD: 1.1 %
BASOPHILS ABSOLUTE: 0.1 THOU/MM3 (ref 0–0.1)
BILIRUB SERPL-MCNC: 0.6 MG/DL (ref 0.3–1.2)
BILIRUBIN URINE: NEGATIVE
BLOOD, URINE: ABNORMAL
BUN BLDV-MCNC: 11 MG/DL (ref 7–22)
C-REACTIVE PROTEIN: 3.22 MG/DL (ref 0–1)
CALCIUM SERPL-MCNC: 9.8 MG/DL (ref 8.5–10.5)
CASTS: ABNORMAL /LPF
CASTS: ABNORMAL /LPF
CHARACTER, URINE: ABNORMAL
CHLORIDE BLD-SCNC: 97 MEQ/L (ref 98–111)
CO2: 25 MEQ/L (ref 23–33)
COLOR: YELLOW
CREAT SERPL-MCNC: 0.7 MG/DL (ref 0.4–1.2)
CRYSTALS: ABNORMAL
EOSINOPHIL # BLD: 1.2 %
EOSINOPHILS ABSOLUTE: 0.2 THOU/MM3 (ref 0–0.4)
EPITHELIAL CELLS, UA: ABNORMAL /HPF
GFR SERPL CREATININE-BSD FRML MDRD: > 90 ML/MIN/1.73M2
GLUCOSE BLD-MCNC: 136 MG/DL (ref 70–108)
GLUCOSE, URINE: NEGATIVE MG/DL
HCT VFR BLD CALC: 40.3 % (ref 37–47)
HEMOGLOBIN: 13.7 GM/DL (ref 12–16)
KETONES, URINE: NEGATIVE
LACTIC ACID: 1.6 MMOL/L (ref 0.5–2.2)
LEUKOCYTE ESTERASE, URINE: ABNORMAL
LYMPHOCYTES # BLD: 19.5 %
LYMPHOCYTES ABSOLUTE: 2.6 THOU/MM3 (ref 1–4.8)
MCH RBC QN AUTO: 30.3 PG (ref 27–31)
MCHC RBC AUTO-ENTMCNC: 33.9 GM/DL (ref 33–37)
MCV RBC AUTO: 89.2 FL (ref 81–99)
MISCELLANEOUS LAB TEST RESULT: ABNORMAL
MONOCYTES # BLD: 4.3 %
MONOCYTES ABSOLUTE: 0.6 THOU/MM3 (ref 0.4–1.3)
NITRITE, URINE: NEGATIVE
NUCLEATED RED BLOOD CELLS: 0 /100 WBC
OSMOLALITY CALCULATION: 273.4 MOSMOL/KG (ref 275–300)
PDW BLD-RTO: 13.1 % (ref 11.5–14.5)
PH UA: 5.5
PLATELET # BLD: 347 THOU/MM3 (ref 130–400)
PMV BLD AUTO: 9.4 MCM (ref 7.4–10.4)
POTASSIUM SERPL-SCNC: 4.1 MEQ/L (ref 3.5–5.2)
PROTEIN UA: NEGATIVE MG/DL
RBC # BLD: 4.52 MILL/MM3 (ref 4.2–5.4)
RBC URINE: ABNORMAL /HPF
RENAL EPITHELIAL, UA: ABNORMAL
SEG NEUTROPHILS: 73.9 %
SEGMENTED NEUTROPHILS ABSOLUTE COUNT: 9.8 THOU/MM3 (ref 1.8–7.7)
SODIUM BLD-SCNC: 136 MEQ/L (ref 135–145)
SPECIFIC GRAVITY UA: 1.01 (ref 1–1.03)
TOTAL PROTEIN: 7.9 G/DL (ref 6.1–8)
UROBILINOGEN, URINE: 0.2 EU/DL
WBC # BLD: 13.3 THOU/MM3 (ref 4.8–10.8)
WBC UA: ABNORMAL /HPF
YEAST: ABNORMAL

## 2017-12-30 PROCEDURE — 6360000004 HC RX CONTRAST MEDICATION: Performed by: EMERGENCY MEDICINE

## 2017-12-30 PROCEDURE — 74177 CT ABD & PELVIS W/CONTRAST: CPT

## 2017-12-30 PROCEDURE — 80053 COMPREHEN METABOLIC PANEL: CPT

## 2017-12-30 PROCEDURE — 96374 THER/PROPH/DIAG INJ IV PUSH: CPT

## 2017-12-30 PROCEDURE — 81001 URINALYSIS AUTO W/SCOPE: CPT

## 2017-12-30 PROCEDURE — 87086 URINE CULTURE/COLONY COUNT: CPT

## 2017-12-30 PROCEDURE — 74022 RADEX COMPL AQT ABD SERIES: CPT

## 2017-12-30 PROCEDURE — 36415 COLL VENOUS BLD VENIPUNCTURE: CPT

## 2017-12-30 PROCEDURE — 86140 C-REACTIVE PROTEIN: CPT

## 2017-12-30 PROCEDURE — 6360000002 HC RX W HCPCS: Performed by: EMERGENCY MEDICINE

## 2017-12-30 PROCEDURE — 99284 EMERGENCY DEPT VISIT MOD MDM: CPT

## 2017-12-30 PROCEDURE — 83605 ASSAY OF LACTIC ACID: CPT

## 2017-12-30 PROCEDURE — 85025 COMPLETE CBC W/AUTO DIFF WBC: CPT

## 2017-12-30 RX ORDER — CIPROFLOXACIN 500 MG/1
500 TABLET, FILM COATED ORAL 2 TIMES DAILY
Qty: 20 TABLET | Refills: 0 | Status: SHIPPED | OUTPATIENT
Start: 2017-12-30 | End: 2018-01-09

## 2017-12-30 RX ORDER — OXYCODONE HYDROCHLORIDE AND ACETAMINOPHEN 5; 325 MG/1; MG/1
1 TABLET ORAL EVERY 6 HOURS PRN
COMMUNITY
End: 2019-01-05 | Stop reason: ALTCHOICE

## 2017-12-30 RX ORDER — MORPHINE SULFATE 2 MG/ML
4 INJECTION, SOLUTION INTRAMUSCULAR; INTRAVENOUS ONCE
Status: COMPLETED | OUTPATIENT
Start: 2017-12-30 | End: 2017-12-30

## 2017-12-30 RX ADMIN — MORPHINE SULFATE 4 MG: 2 INJECTION, SOLUTION INTRAMUSCULAR; INTRAVENOUS at 15:54

## 2017-12-30 RX ADMIN — IOPAMIDOL 85 ML: 755 INJECTION, SOLUTION INTRAVENOUS at 16:51

## 2017-12-30 ASSESSMENT — ENCOUNTER SYMPTOMS
RHINORRHEA: 0
EYE DISCHARGE: 0
ABDOMINAL PAIN: 1
SHORTNESS OF BREATH: 0
EYE PAIN: 0
WHEEZING: 0
NAUSEA: 1
SORE THROAT: 0
DIARRHEA: 0
BACK PAIN: 1
COUGH: 0
VOMITING: 0

## 2017-12-30 ASSESSMENT — PAIN DESCRIPTION - DESCRIPTORS: DESCRIPTORS: BURNING

## 2017-12-30 ASSESSMENT — PAIN SCALES - GENERAL
PAINLEVEL_OUTOF10: 5
PAINLEVEL_OUTOF10: 1
PAINLEVEL_OUTOF10: 5

## 2017-12-30 ASSESSMENT — PAIN DESCRIPTION - ORIENTATION: ORIENTATION: LOWER

## 2017-12-30 ASSESSMENT — PAIN DESCRIPTION - FREQUENCY: FREQUENCY: INTERMITTENT

## 2017-12-30 ASSESSMENT — PAIN DESCRIPTION - PAIN TYPE: TYPE: ACUTE PAIN

## 2017-12-30 NOTE — ED PROVIDER NOTES
[facility]  eMERGENCY dEPARTMENT eNCOUnter          CHIEF COMPLAINT       Chief Complaint   Patient presents with    Dysuria       Nurses Notes reviewed and I agree except as noted in the HPI. HISTORY OF PRESENT ILLNESS    Carmen Paulson is a 38 y.o. female who presents to the Emergency Department for the evaluation of abdominal pain, lower back pain, and dysuria. The patient had a robotic hysterectomy on the 12th of this month. This surgery was performed by Dr. Virginie Fink. The patient was discharged home on Percocet for pain control. The patient had what she refers to as \"gas\" pains for the weeks following the surgery. For the past 5 days, this abdominal pain has increased. Patient is concerned she may have another UTI due to her symptom of dysuria. Patient has a significant history of UTI's. She has one 2 days prior to her surgery and has also had one since the surgery. Patient is on Bactrim which was prescribed by Dr. Virginie Fink. This is her 10th day on the medication. Patient complains of nausea in addition to the above symptoms. She is no longer taking the Percocet because she did not feel this was helping her pain. Her bowel movements have been normal. She denies any vomiting or URI symptoms. There are no other complaints or symptoms at this time. The HPI was provided by the patient. REVIEW OF SYSTEMS     Review of Systems   Constitutional: Negative for appetite change, chills, fatigue and fever. HENT: Negative for congestion, ear pain, rhinorrhea and sore throat. Eyes: Negative for pain, discharge and visual disturbance. Respiratory: Negative for cough, shortness of breath and wheezing. Cardiovascular: Negative for chest pain, palpitations and leg swelling. Gastrointestinal: Positive for abdominal pain and nausea. Negative for diarrhea and vomiting. Genitourinary: Positive for dysuria. Negative for difficulty urinating and vaginal discharge.    Musculoskeletal: Positive for back pain. Negative for arthralgias, joint swelling and neck pain. Skin: Negative for pallor and rash. Neurological: Negative for dizziness, syncope, weakness, light-headedness and headaches. Hematological: Negative for adenopathy. Psychiatric/Behavioral: Negative for confusion, dysphoric mood and suicidal ideas. The patient is not nervous/anxious. PAST MEDICAL HISTORY    has a past medical history of Hypertension and PONV (postoperative nausea and vomiting). SURGICAL HISTORY      has a past surgical history that includes Endometrial ablation; other surgical history; Cholecystectomy; and HYSTERECTOMY ABDOMINAL (N/A, 12/12/2017). CURRENT MEDICATIONS       Discharge Medication List as of 12/30/2017  5:51 PM      CONTINUE these medications which have NOT CHANGED    Details   oxyCODONE-acetaminophen (PERCOCET) 5-325 MG per tablet Take 1 tablet by mouth every 6 hours as needed for Pain. Historical Med      aspirin-acetaminophen-caffeine (EXCEDRIN MIGRAINE) 250-250-65 MG per tablet Take 2 tablets by mouth every 6 hours as needed for HeadachesHistorical Med      losartan-hydrochlorothiazide (HYZAAR) 50-12.5 MG per tablet Take 1 tablet by mouth dailyHistorical Med      naproxen (NAPROSYN) 500 MG tablet Take 1 tablet by mouth 2 times daily, Disp-60 tablet, R-0Print      ondansetron (ZOFRAN ODT) 4 MG disintegrating tablet Take 1 tablet by mouth every 8 hours as needed for Nausea, Disp-20 tablet, R-0Print      FLUoxetine (PROZAC) 20 MG capsule Take 20 mg by mouth daily. ALLERGIES     has No Known Allergies. FAMILY HISTORY     indicated that her mother is alive. She indicated that her father is alive. family history includes Heart Disease in her father. SOCIAL HISTORY      reports that she has never smoked. She has never used smokeless tobacco. She reports that she does not drink alcohol or use drugs.     PHYSICAL EXAM     INITIAL VITALS:  height is 5' 6\" (1.676 m) and weight Final report electronically signed by Dr. Myrna Johnson on 12/30/2017 3:02 PM          LABS:   Labs Reviewed   CBC WITH AUTO DIFFERENTIAL - Abnormal; Notable for the following:        Result Value    WBC 13.3 (*)     Segs Absolute 9.8 (*)     All other components within normal limits   COMPREHENSIVE METABOLIC PANEL - Abnormal; Notable for the following:     Glucose 136 (*)     Chloride 97 (*)     AST 55 (*)      (*)     All other components within normal limits   URINALYSIS WITH MICROSCOPIC - Abnormal; Notable for the following:     Blood, Urine MODERATE (*)     Leukocyte Esterase, Urine MODERATE (*)     Character, Urine CLOUDY (*)     All other components within normal limits   C-REACTIVE PROTEIN - Abnormal; Notable for the following:     CRP 3.22 (*)     All other components within normal limits   OSMOLALITY - Abnormal; Notable for the following:     Osmolality Calc 273.4 (*)     All other components within normal limits   URINE CULTURE   LACTIC ACID, PLASMA   ANION GAP   GLOMERULAR FILTRATION RATE, ESTIMATED       EMERGENCY DEPARTMENT COURSE:   Vitals:    Vitals:    12/30/17 1549 12/30/17 1701 12/30/17 1702 12/30/17 1741   BP: (!) 150/78 132/82  130/69   Pulse: 87 92  89   Resp: 16 16  16   Temp:   97.2 °F (36.2 °C)    TempSrc:   Axillary    SpO2: 97% 95%  98%   Weight:       Height:         1:55 PM: The patient was seen and evaluated. CRITICAL CARE:        CONSULTS:      PROCEDURES:  None    FINAL IMPRESSION      1. Generalized abdominal pain    2. Dysuria    3. Urinary tract infection with hematuria, site unspecified          DISPOSITION/PLAN     Patient presented with a complaint of dysuria abdominal pain. The workup was extensive including the blood work and a CT scan of the abdomen and pelvis. CT scan of the abdomen and pelvis without any acute findings. I have discussed these findings with the patient and  at the bedside. She had moderate leukocyte esterase with WBCs in her urine. She's tried Bactrim in the past for UTI. I would initiate ciprofloxacin. She will follow-up with her primary care physician. Anticipatory guidance and return precautions were given. The patient and spouse acknowledged understanding and agrees with the plan. Patient will be discharged. PATIENT REFERRED TO:  Jany Read MD  [address]  [place] [address] 85  [phone]    Schedule an appointment as soon as possible for a visit in 2 days        DISCHARGE MEDICATIONS:  Discharge Medication List as of 12/30/2017  5:51 PM          (Please note that portions of this note were completed with a voice recognition program.  Efforts were made to edit the dictations but occasionally words are mis-transcribed.)    Scribe:  Signed by: Brian Cuevas, 12/30/17 1:55 PM Scribing for and in the presence of Rosina Berman MD    Provider:  I personally performed the services described in the documentation, reviewed and edited the documentation which was dictated to the scribe in my presence, and it accurately records my words and actions.     Rosina Berman MD 12/30/17 6:27 PM                      Rosina Berman MD  Resident  12/30/17 [id number]

## 2017-12-30 NOTE — ED NOTES
Patient transported to Radiology department via [employer] tech in stable condition.        Lorie Barkley RN  12/30/17 8309

## 2017-12-31 LAB
ORGANISM: ABNORMAL
URINE CULTURE, ROUTINE: ABNORMAL

## 2018-11-23 ENCOUNTER — HOSPITAL ENCOUNTER (OUTPATIENT)
Age: 43
Discharge: HOME | End: 2018-11-23
Payer: COMMERCIAL

## 2018-11-23 LAB
AMORPHOUS: ABNORMAL
BACTERIA: ABNORMAL /HPF
BILIRUBIN URINE: NEGATIVE
BLOOD, URINE: NEGATIVE
CASTS 2: ABNORMAL /LPF
CASTS UA: ABNORMAL /LPF
CHARACTER, URINE: ABNORMAL
COLOR: YELLOW
CREATININE, URINE: 145.4 MG/DL
CRYSTALS, UA: ABNORMAL
EPITHELIAL CELLS, UA: ABNORMAL /HPF
GLUCOSE URINE: NEGATIVE MG/DL
KETONES, URINE: NEGATIVE
LEUKOCYTE ESTERASE, URINE: NEGATIVE
MICROALBUMIN UR-MCNC: 3.82 MG/DL
MICROALBUMIN/CREAT UR-RTO: 26 MG/G (ref 0–30)
MISCELLANEOUS 2: ABNORMAL
MUCUS: ABNORMAL
NITRITE, URINE: NEGATIVE
PH UA: 6
PROTEIN UA: NEGATIVE
RBC URINE: ABNORMAL /HPF
RENAL EPITHELIAL, UA: ABNORMAL
SPECIFIC GRAVITY, URINE: 1.01 (ref 1–1.03)
UROBILINOGEN, URINE: 0.2 EU/DL
WBC UA: ABNORMAL /HPF
YEAST: ABNORMAL

## 2018-11-23 PROCEDURE — 82043 UR ALBUMIN QUANTITATIVE: CPT

## 2018-11-23 PROCEDURE — 87086 URINE CULTURE/COLONY COUNT: CPT

## 2018-11-23 PROCEDURE — 81001 URINALYSIS AUTO W/SCOPE: CPT

## 2018-11-24 LAB
ORGANISM: ABNORMAL
URINE CULTURE REFLEX: ABNORMAL

## 2018-12-22 ENCOUNTER — HOSPITAL ENCOUNTER (EMERGENCY)
Age: 43
Discharge: HOME | End: 2018-12-22
Payer: COMMERCIAL

## 2018-12-22 VITALS
SYSTOLIC BLOOD PRESSURE: 170 MMHG | TEMPERATURE: 97.2 F | HEIGHT: 66 IN | DIASTOLIC BLOOD PRESSURE: 80 MMHG | HEART RATE: 77 BPM | WEIGHT: 293 LBS | BODY MASS INDEX: 47.09 KG/M2 | RESPIRATION RATE: 16 BRPM | OXYGEN SATURATION: 98 %

## 2018-12-22 DIAGNOSIS — J02.9 ACUTE PHARYNGITIS, UNSPECIFIED ETIOLOGY: ICD-10-CM

## 2018-12-22 DIAGNOSIS — B00.9 HERPES SIMPLEX: ICD-10-CM

## 2018-12-22 DIAGNOSIS — H65.01 RIGHT ACUTE SEROUS OTITIS MEDIA, RECURRENCE NOT SPECIFIED: Primary | ICD-10-CM

## 2018-12-22 PROCEDURE — 99213 OFFICE O/P EST LOW 20 MIN: CPT

## 2018-12-22 PROCEDURE — 99213 OFFICE O/P EST LOW 20 MIN: CPT | Performed by: NURSE PRACTITIONER

## 2018-12-22 RX ORDER — AMOXICILLIN 500 MG/1
500 CAPSULE ORAL 2 TIMES DAILY
Qty: 20 CAPSULE | Refills: 0 | Status: SHIPPED | OUTPATIENT
Start: 2018-12-22 | End: 2019-01-01

## 2018-12-22 RX ORDER — ACYCLOVIR 50 MG/G
OINTMENT TOPICAL
Qty: 1 TUBE | Refills: 1 | Status: SHIPPED | OUTPATIENT
Start: 2018-12-22 | End: 2018-12-27

## 2018-12-22 RX ORDER — ACETAMINOPHEN 325 MG/1
650 TABLET ORAL EVERY 6 HOURS PRN
COMMUNITY
End: 2019-07-12

## 2018-12-22 ASSESSMENT — PAIN DESCRIPTION - PAIN TYPE: TYPE: ACUTE PAIN

## 2018-12-22 ASSESSMENT — ENCOUNTER SYMPTOMS
NAUSEA: 0
WHEEZING: 0
RHINORRHEA: 1
CONSTIPATION: 0
SHORTNESS OF BREATH: 0
ABDOMINAL PAIN: 0
DIARRHEA: 0
SORE THROAT: 1
COUGH: 1

## 2018-12-22 ASSESSMENT — PAIN DESCRIPTION - LOCATION: LOCATION: THROAT

## 2018-12-22 ASSESSMENT — PAIN SCALES - GENERAL: PAINLEVEL_OUTOF10: 3

## 2018-12-22 NOTE — ED PROVIDER NOTES
(AMOXIL) 500 MG capsule Take 1 capsule by mouth 2 times daily for 10 days, Disp-20 capsule, R-0Normal      acyclovir (ZOVIRAX) 5 % ointment Apply topically every 3 hours for 5 days Apply topically every 3 hours. , Topical, EVERY 3 HOURS Starting Sat 12/22/2018, Until Thu 12/27/2018, 40 doses, Disp-1 Tube, R-1, Normal           Discharge Medication List as of 12/22/2018  4:36 PM          THADDEUS Jorgensen - THADDEUS Torres CNP  12/22/18 1711

## 2019-01-05 ENCOUNTER — HOSPITAL ENCOUNTER (EMERGENCY)
Age: 44
Discharge: HOME | End: 2019-01-05
Payer: COMMERCIAL

## 2019-01-05 VITALS
HEART RATE: 74 BPM | TEMPERATURE: 97.8 F | WEIGHT: 293 LBS | BODY MASS INDEX: 48.82 KG/M2 | OXYGEN SATURATION: 98 % | SYSTOLIC BLOOD PRESSURE: 184 MMHG | DIASTOLIC BLOOD PRESSURE: 95 MMHG | HEIGHT: 65 IN | RESPIRATION RATE: 16 BRPM

## 2019-01-05 DIAGNOSIS — J02.9 ACUTE PHARYNGITIS, UNSPECIFIED ETIOLOGY: Primary | ICD-10-CM

## 2019-01-05 DIAGNOSIS — B97.89 ACUTE VIRAL SINUSITIS: ICD-10-CM

## 2019-01-05 DIAGNOSIS — J01.90 ACUTE VIRAL SINUSITIS: ICD-10-CM

## 2019-01-05 LAB
GROUP A STREP CULTURE, REFLEX: NEGATIVE
REFLEX THROAT C + S: NORMAL

## 2019-01-05 PROCEDURE — 87070 CULTURE OTHR SPECIMN AEROBIC: CPT

## 2019-01-05 PROCEDURE — 99213 OFFICE O/P EST LOW 20 MIN: CPT | Performed by: NURSE PRACTITIONER

## 2019-01-05 PROCEDURE — 99213 OFFICE O/P EST LOW 20 MIN: CPT

## 2019-01-05 RX ORDER — AZELASTINE 1 MG/ML
1 SPRAY, METERED NASAL 2 TIMES DAILY
Qty: 1 BOTTLE | Refills: 0 | Status: SHIPPED | OUTPATIENT
Start: 2019-01-05 | End: 2019-01-09

## 2019-01-05 RX ORDER — CETIRIZINE HYDROCHLORIDE 10 MG/1
10 TABLET ORAL DAILY
Qty: 30 TABLET | Refills: 0 | Status: SHIPPED | OUTPATIENT
Start: 2019-01-05 | End: 2019-02-04

## 2019-01-05 RX ORDER — IBUPROFEN 200 MG
200 TABLET ORAL EVERY 6 HOURS PRN
COMMUNITY
End: 2019-07-12

## 2019-01-05 RX ORDER — FEXOFENADINE HCL 180 MG/1
180 TABLET ORAL DAILY
COMMUNITY
End: 2019-01-15 | Stop reason: ALTCHOICE

## 2019-01-05 RX ORDER — PREDNISONE 20 MG/1
20 TABLET ORAL 2 TIMES DAILY
Qty: 10 TABLET | Refills: 0 | Status: SHIPPED | OUTPATIENT
Start: 2019-01-05 | End: 2019-01-10

## 2019-01-05 ASSESSMENT — PAIN DESCRIPTION - LOCATION: LOCATION: THROAT

## 2019-01-05 ASSESSMENT — ENCOUNTER SYMPTOMS
RHINORRHEA: 1
COUGH: 1
SORE THROAT: 1

## 2019-01-05 ASSESSMENT — PAIN DESCRIPTION - PAIN TYPE: TYPE: ACUTE PAIN

## 2019-01-05 ASSESSMENT — PAIN DESCRIPTION - DESCRIPTORS: DESCRIPTORS: ACHING

## 2019-01-05 ASSESSMENT — PAIN SCALES - GENERAL: PAINLEVEL_OUTOF10: 6

## 2019-01-07 LAB — THROAT/NOSE CULTURE: NORMAL

## 2019-01-09 ENCOUNTER — NURSE TRIAGE (OUTPATIENT)
Dept: ADMINISTRATIVE | Age: 44
End: 2019-01-09

## 2019-01-09 ENCOUNTER — APPOINTMENT (OUTPATIENT)
Dept: CT IMAGING | Age: 44
End: 2019-01-09
Payer: COMMERCIAL

## 2019-01-09 ENCOUNTER — HOSPITAL ENCOUNTER (EMERGENCY)
Age: 44
Discharge: HOME | End: 2019-01-10
Attending: EMERGENCY MEDICINE
Payer: COMMERCIAL

## 2019-01-09 VITALS
TEMPERATURE: 98.1 F | RESPIRATION RATE: 16 BRPM | BODY MASS INDEX: 47.09 KG/M2 | SYSTOLIC BLOOD PRESSURE: 157 MMHG | DIASTOLIC BLOOD PRESSURE: 73 MMHG | OXYGEN SATURATION: 98 % | HEIGHT: 66 IN | HEART RATE: 73 BPM | WEIGHT: 293 LBS

## 2019-01-09 DIAGNOSIS — I16.0 HYPERTENSIVE URGENCY: Primary | ICD-10-CM

## 2019-01-09 LAB
ALBUMIN SERPL-MCNC: 4.2 G/DL (ref 3.5–5.1)
ALP BLD-CCNC: 100 U/L (ref 38–126)
ALT SERPL-CCNC: 30 U/L (ref 11–66)
ANION GAP SERPL CALCULATED.3IONS-SCNC: 10 MEQ/L (ref 8–16)
AST SERPL-CCNC: 19 U/L (ref 5–40)
BASOPHILS # BLD: 0.4 %
BASOPHILS ABSOLUTE: 0.1 THOU/MM3 (ref 0–0.1)
BILIRUB SERPL-MCNC: 0.2 MG/DL (ref 0.3–1.2)
BILIRUBIN DIRECT: < 0.2 MG/DL (ref 0–0.3)
BUN BLDV-MCNC: 17 MG/DL (ref 7–22)
CALCIUM SERPL-MCNC: 9.7 MG/DL (ref 8.5–10.5)
CHLORIDE BLD-SCNC: 97 MEQ/L (ref 98–111)
CO2: 30 MEQ/L (ref 23–33)
CREAT SERPL-MCNC: 0.8 MG/DL (ref 0.4–1.2)
EKG ATRIAL RATE: 73 BPM
EKG P AXIS: 33 DEGREES
EKG P-R INTERVAL: 164 MS
EKG Q-T INTERVAL: 392 MS
EKG QRS DURATION: 82 MS
EKG QTC CALCULATION (BAZETT): 431 MS
EKG R AXIS: 26 DEGREES
EKG T AXIS: 52 DEGREES
EKG VENTRICULAR RATE: 73 BPM
EOSINOPHIL # BLD: 0.2 %
EOSINOPHILS ABSOLUTE: 0 THOU/MM3 (ref 0–0.4)
ERYTHROCYTE [DISTWIDTH] IN BLOOD BY AUTOMATED COUNT: 11.9 % (ref 11.5–14.5)
ERYTHROCYTE [DISTWIDTH] IN BLOOD BY AUTOMATED COUNT: 38.8 FL (ref 35–45)
ETHYL ALCOHOL, SERUM: < 0.01 %
GFR SERPL CREATININE-BSD FRML MDRD: 79 ML/MIN/1.73M2
GLUCOSE BLD-MCNC: 217 MG/DL (ref 70–108)
HCT VFR BLD CALC: 41.9 % (ref 37–47)
HEMOGLOBIN: 14.3 GM/DL (ref 12–16)
IMMATURE GRANS (ABS): 0.23 THOU/MM3 (ref 0–0.07)
IMMATURE GRANULOCYTES: 1.4 %
LIPASE: 71.4 U/L (ref 5.6–51.3)
LYMPHOCYTES # BLD: 29.6 %
LYMPHOCYTES ABSOLUTE: 4.8 THOU/MM3 (ref 1–4.8)
MAGNESIUM: 2 MG/DL (ref 1.6–2.4)
MCH RBC QN AUTO: 31 PG (ref 26–33)
MCHC RBC AUTO-ENTMCNC: 34.1 GM/DL (ref 32.2–35.5)
MCV RBC AUTO: 90.9 FL (ref 81–99)
MONOCYTES # BLD: 6.4 %
MONOCYTES ABSOLUTE: 1 THOU/MM3 (ref 0.4–1.3)
NUCLEATED RED BLOOD CELLS: 0 /100 WBC
OSMOLALITY CALCULATION: 281.9 MOSMOL/KG (ref 275–300)
PLATELET # BLD: 392 THOU/MM3 (ref 130–400)
PMV BLD AUTO: 10.8 FL (ref 9.4–12.4)
POTASSIUM SERPL-SCNC: 4.3 MEQ/L (ref 3.5–5.2)
PRO-BNP: 102 PG/ML (ref 0–450)
RBC # BLD: 4.61 MILL/MM3 (ref 4.2–5.4)
SEG NEUTROPHILS: 62 %
SEGMENTED NEUTROPHILS ABSOLUTE COUNT: 10 THOU/MM3 (ref 1.8–7.7)
SODIUM BLD-SCNC: 137 MEQ/L (ref 135–145)
TOTAL PROTEIN: 6.7 G/DL (ref 6.1–8)
TROPONIN T: < 0.01 NG/ML
TSH SERPL DL<=0.05 MIU/L-ACNC: 4.13 UIU/ML (ref 0.4–4.2)
WBC # BLD: 16.2 THOU/MM3 (ref 4.8–10.8)

## 2019-01-09 PROCEDURE — 99284 EMERGENCY DEPT VISIT MOD MDM: CPT

## 2019-01-09 PROCEDURE — 80053 COMPREHEN METABOLIC PANEL: CPT

## 2019-01-09 PROCEDURE — 96374 THER/PROPH/DIAG INJ IV PUSH: CPT

## 2019-01-09 PROCEDURE — 36415 COLL VENOUS BLD VENIPUNCTURE: CPT

## 2019-01-09 PROCEDURE — 83880 ASSAY OF NATRIURETIC PEPTIDE: CPT

## 2019-01-09 PROCEDURE — 83690 ASSAY OF LIPASE: CPT

## 2019-01-09 PROCEDURE — 83735 ASSAY OF MAGNESIUM: CPT

## 2019-01-09 PROCEDURE — 84443 ASSAY THYROID STIM HORMONE: CPT

## 2019-01-09 PROCEDURE — 2500000003 HC RX 250 WO HCPCS: Performed by: EMERGENCY MEDICINE

## 2019-01-09 PROCEDURE — 85025 COMPLETE CBC W/AUTO DIFF WBC: CPT

## 2019-01-09 PROCEDURE — 82248 BILIRUBIN DIRECT: CPT

## 2019-01-09 PROCEDURE — 70450 CT HEAD/BRAIN W/O DYE: CPT

## 2019-01-09 PROCEDURE — 93005 ELECTROCARDIOGRAM TRACING: CPT | Performed by: EMERGENCY MEDICINE

## 2019-01-09 PROCEDURE — G0480 DRUG TEST DEF 1-7 CLASSES: HCPCS

## 2019-01-09 PROCEDURE — 84484 ASSAY OF TROPONIN QUANT: CPT

## 2019-01-09 RX ORDER — LISINOPRIL 10 MG/1
10 TABLET ORAL NIGHTLY
COMMUNITY
End: 2019-01-15 | Stop reason: SDUPTHER

## 2019-01-09 RX ORDER — ENALAPRILAT 2.5 MG/2ML
1.25 INJECTION INTRAVENOUS ONCE
Status: COMPLETED | OUTPATIENT
Start: 2019-01-09 | End: 2019-01-09

## 2019-01-09 RX ADMIN — ENALAPRILAT 1.25 MG: 2.5 INJECTION INTRAVENOUS at 22:47

## 2019-01-09 ASSESSMENT — ENCOUNTER SYMPTOMS
SORE THROAT: 0
CONSTIPATION: 0
VOMITING: 0
SHORTNESS OF BREATH: 0
SINUS PRESSURE: 0
CHEST TIGHTNESS: 0
RHINORRHEA: 0
ABDOMINAL PAIN: 0
BACK PAIN: 0
ABDOMINAL DISTENTION: 0
EYE ITCHING: 0
EYE REDNESS: 0
DIARRHEA: 0
CHOKING: 0
EYE PAIN: 0
VOICE CHANGE: 0
NAUSEA: 0
TROUBLE SWALLOWING: 0
WHEEZING: 0
BLOOD IN STOOL: 0
EYE DISCHARGE: 0
COUGH: 0
PHOTOPHOBIA: 0

## 2019-01-10 PROCEDURE — 93010 ELECTROCARDIOGRAM REPORT: CPT | Performed by: NUCLEAR MEDICINE

## 2019-01-10 RX ORDER — CLONIDINE HYDROCHLORIDE 0.1 MG/1
0.1 TABLET ORAL PRN
Qty: 15 TABLET | Refills: 0 | Status: SHIPPED | OUTPATIENT
Start: 2019-01-10 | End: 2019-07-12

## 2019-01-12 ENCOUNTER — HOSPITAL ENCOUNTER (OUTPATIENT)
Age: 44
Discharge: HOME | End: 2019-01-12
Payer: COMMERCIAL

## 2019-01-12 LAB
ALBUMIN SERPL-MCNC: 3.9 G/DL (ref 3.5–5.1)
ALP BLD-CCNC: 94 U/L (ref 38–126)
ALT SERPL-CCNC: 36 U/L (ref 11–66)
ANION GAP SERPL CALCULATED.3IONS-SCNC: 12 MEQ/L (ref 8–16)
AST SERPL-CCNC: 22 U/L (ref 5–40)
AVERAGE GLUCOSE: 156 MG/DL (ref 70–126)
BASOPHILS # BLD: 0.3 %
BASOPHILS ABSOLUTE: 0 THOU/MM3 (ref 0–0.1)
BILIRUB SERPL-MCNC: 0.6 MG/DL (ref 0.3–1.2)
BILIRUBIN DIRECT: < 0.2 MG/DL (ref 0–0.3)
BUN BLDV-MCNC: 15 MG/DL (ref 7–22)
CALCIUM SERPL-MCNC: 9.3 MG/DL (ref 8.5–10.5)
CHLORIDE BLD-SCNC: 98 MEQ/L (ref 98–111)
CHOLESTEROL, TOTAL: 253 MG/DL (ref 100–199)
CO2: 29 MEQ/L (ref 23–33)
CREAT SERPL-MCNC: 0.9 MG/DL (ref 0.4–1.2)
CREATININE, URINE: 228.4 MG/DL
EOSINOPHIL # BLD: 1 %
EOSINOPHILS ABSOLUTE: 0.1 THOU/MM3 (ref 0–0.4)
ERYTHROCYTE [DISTWIDTH] IN BLOOD BY AUTOMATED COUNT: 12.2 % (ref 11.5–14.5)
ERYTHROCYTE [DISTWIDTH] IN BLOOD BY AUTOMATED COUNT: 39.6 FL (ref 35–45)
FERRITIN: 134 NG/ML (ref 10–291)
FOLATE: 11.7 NG/ML (ref 4.8–24.2)
GFR SERPL CREATININE-BSD FRML MDRD: 69 ML/MIN/1.73M2
GLUCOSE BLD-MCNC: 167 MG/DL (ref 70–108)
HBA1C MFR BLD: 7.2 % (ref 4.4–6.4)
HCT VFR BLD CALC: 45.2 % (ref 37–47)
HDLC SERPL-MCNC: 45 MG/DL
HEMOGLOBIN: 15.2 GM/DL (ref 12–16)
IMMATURE GRANS (ABS): 0.13 THOU/MM3 (ref 0–0.07)
IMMATURE GRANULOCYTES: 1 %
IRON SATURATION: 32 % (ref 20–50)
IRON: 92 UG/DL (ref 50–170)
LDL CHOLESTEROL CALCULATED: 137 MG/DL
LYMPHOCYTES # BLD: 24 %
LYMPHOCYTES ABSOLUTE: 3.3 THOU/MM3 (ref 1–4.8)
MCH RBC QN AUTO: 30.3 PG (ref 26–33)
MCHC RBC AUTO-ENTMCNC: 33.6 GM/DL (ref 32.2–35.5)
MCV RBC AUTO: 90.2 FL (ref 81–99)
MICROALBUMIN UR-MCNC: 1.49 MG/DL
MICROALBUMIN/CREAT UR-RTO: 7 MG/G (ref 0–30)
MONOCYTES # BLD: 4.5 %
MONOCYTES ABSOLUTE: 0.6 THOU/MM3 (ref 0.4–1.3)
NUCLEATED RED BLOOD CELLS: 0 /100 WBC
PLATELET # BLD: 393 THOU/MM3 (ref 130–400)
PMV BLD AUTO: 11.2 FL (ref 9.4–12.4)
POTASSIUM SERPL-SCNC: 4 MEQ/L (ref 3.5–5.2)
RBC # BLD: 5.01 MILL/MM3 (ref 4.2–5.4)
SEG NEUTROPHILS: 69.2 %
SEGMENTED NEUTROPHILS ABSOLUTE COUNT: 9.4 THOU/MM3 (ref 1.8–7.7)
SODIUM BLD-SCNC: 139 MEQ/L (ref 135–145)
TOTAL IRON BINDING CAPACITY: 292 UG/DL (ref 171–450)
TOTAL PROTEIN: 7 G/DL (ref 6.1–8)
TRIGL SERPL-MCNC: 354 MG/DL (ref 0–199)
TSH SERPL DL<=0.05 MIU/L-ACNC: 2.88 UIU/ML (ref 0.4–4.2)
VITAMIN B-12: 465 PG/ML (ref 211–911)
WBC # BLD: 13.6 THOU/MM3 (ref 4.8–10.8)

## 2019-01-12 PROCEDURE — 80053 COMPREHEN METABOLIC PANEL: CPT

## 2019-01-12 PROCEDURE — 82306 VITAMIN D 25 HYDROXY: CPT

## 2019-01-12 PROCEDURE — 84443 ASSAY THYROID STIM HORMONE: CPT

## 2019-01-12 PROCEDURE — 83540 ASSAY OF IRON: CPT

## 2019-01-12 PROCEDURE — 80061 LIPID PANEL: CPT

## 2019-01-12 PROCEDURE — 83550 IRON BINDING TEST: CPT

## 2019-01-12 PROCEDURE — 85025 COMPLETE CBC W/AUTO DIFF WBC: CPT

## 2019-01-12 PROCEDURE — 82728 ASSAY OF FERRITIN: CPT

## 2019-01-12 PROCEDURE — 82043 UR ALBUMIN QUANTITATIVE: CPT

## 2019-01-12 PROCEDURE — 82248 BILIRUBIN DIRECT: CPT

## 2019-01-12 PROCEDURE — 82746 ASSAY OF FOLIC ACID SERUM: CPT

## 2019-01-12 PROCEDURE — 82607 VITAMIN B-12: CPT

## 2019-01-12 PROCEDURE — 83036 HEMOGLOBIN GLYCOSYLATED A1C: CPT

## 2019-01-12 PROCEDURE — 36415 COLL VENOUS BLD VENIPUNCTURE: CPT

## 2019-01-13 LAB
REASON FOR REJECTION: NORMAL
REJECTED TEST: NORMAL

## 2019-01-15 ENCOUNTER — OFFICE VISIT (OUTPATIENT)
Dept: CARDIOLOGY CLINIC | Age: 44
End: 2019-01-15
Payer: COMMERCIAL

## 2019-01-15 VITALS
DIASTOLIC BLOOD PRESSURE: 72 MMHG | SYSTOLIC BLOOD PRESSURE: 156 MMHG | WEIGHT: 293 LBS | HEIGHT: 66 IN | HEART RATE: 68 BPM | BODY MASS INDEX: 47.09 KG/M2

## 2019-01-15 DIAGNOSIS — I10 ESSENTIAL HYPERTENSION: Primary | ICD-10-CM

## 2019-01-15 DIAGNOSIS — E78.01 FAMILIAL HYPERCHOLESTEROLEMIA: ICD-10-CM

## 2019-01-15 DIAGNOSIS — R06.09 DYSPNEA ON EXERTION: ICD-10-CM

## 2019-01-15 PROCEDURE — G8427 DOCREV CUR MEDS BY ELIG CLIN: HCPCS | Performed by: NUCLEAR MEDICINE

## 2019-01-15 PROCEDURE — G8484 FLU IMMUNIZE NO ADMIN: HCPCS | Performed by: NUCLEAR MEDICINE

## 2019-01-15 PROCEDURE — G8417 CALC BMI ABV UP PARAM F/U: HCPCS | Performed by: NUCLEAR MEDICINE

## 2019-01-15 PROCEDURE — 1036F TOBACCO NON-USER: CPT | Performed by: NUCLEAR MEDICINE

## 2019-01-15 PROCEDURE — 99204 OFFICE O/P NEW MOD 45 MIN: CPT | Performed by: NUCLEAR MEDICINE

## 2019-01-15 RX ORDER — AMLODIPINE BESYLATE 5 MG/1
5 TABLET ORAL DAILY
COMMUNITY
End: 2019-07-12

## 2019-01-15 RX ORDER — LISINOPRIL 10 MG/1
10 TABLET ORAL 2 TIMES DAILY
Qty: 60 TABLET | Refills: 6 | Status: SHIPPED | OUTPATIENT
Start: 2019-01-15 | End: 2020-11-11

## 2019-01-15 ASSESSMENT — ENCOUNTER SYMPTOMS
RECTAL PAIN: 0
NAUSEA: 0
BACK PAIN: 0
ABDOMINAL DISTENTION: 0
SHORTNESS OF BREATH: 1
ABDOMINAL PAIN: 0
VOMITING: 0
CONSTIPATION: 0
DIARRHEA: 0
PHOTOPHOBIA: 0
CHOKING: 0
CHEST TIGHTNESS: 0
ANAL BLEEDING: 0
BLOOD IN STOOL: 0

## 2019-01-17 ENCOUNTER — HOSPITAL ENCOUNTER (OUTPATIENT)
Dept: NON INVASIVE DIAGNOSTICS | Age: 44
Discharge: HOME | End: 2019-01-17
Payer: COMMERCIAL

## 2019-01-17 DIAGNOSIS — R06.09 DYSPNEA ON EXERTION: ICD-10-CM

## 2019-01-17 DIAGNOSIS — I10 ESSENTIAL HYPERTENSION: ICD-10-CM

## 2019-01-17 DIAGNOSIS — E78.01 FAMILIAL HYPERCHOLESTEROLEMIA: ICD-10-CM

## 2019-01-17 LAB
LV EF: 60 %
LVEF MODALITY: NORMAL
VITAMIN D2 AND D3, TOTAL: NORMAL

## 2019-01-17 PROCEDURE — 93306 TTE W/DOPPLER COMPLETE: CPT

## 2019-04-08 ENCOUNTER — TELEPHONE (OUTPATIENT)
Dept: CARDIOLOGY CLINIC | Age: 44
End: 2019-04-08

## 2019-04-08 NOTE — TELEPHONE ENCOUNTER
Pt states that she is having some light headedness and she states that you told her that might happen once her bp leveled off, and they are running good. Any recommendations?

## 2019-06-03 RX ORDER — AMOXICILLIN 500 MG/1
CAPSULE ORAL
Qty: 20 CAPSULE | Refills: 0 | OUTPATIENT
Start: 2019-06-03

## 2019-07-12 ENCOUNTER — OFFICE VISIT (OUTPATIENT)
Dept: CARDIOLOGY CLINIC | Age: 44
End: 2019-07-12
Payer: COMMERCIAL

## 2019-07-12 VITALS
SYSTOLIC BLOOD PRESSURE: 146 MMHG | WEIGHT: 293 LBS | HEART RATE: 62 BPM | HEIGHT: 65 IN | DIASTOLIC BLOOD PRESSURE: 92 MMHG | BODY MASS INDEX: 48.82 KG/M2

## 2019-07-12 DIAGNOSIS — E78.01 FAMILIAL HYPERCHOLESTEROLEMIA: ICD-10-CM

## 2019-07-12 DIAGNOSIS — I10 ESSENTIAL HYPERTENSION: Primary | ICD-10-CM

## 2019-07-12 PROCEDURE — G8417 CALC BMI ABV UP PARAM F/U: HCPCS | Performed by: NUCLEAR MEDICINE

## 2019-07-12 PROCEDURE — 1036F TOBACCO NON-USER: CPT | Performed by: NUCLEAR MEDICINE

## 2019-07-12 PROCEDURE — G8427 DOCREV CUR MEDS BY ELIG CLIN: HCPCS | Performed by: NUCLEAR MEDICINE

## 2019-07-12 PROCEDURE — 99213 OFFICE O/P EST LOW 20 MIN: CPT | Performed by: NUCLEAR MEDICINE

## 2019-07-12 RX ORDER — DOXYCYCLINE HYCLATE 100 MG/1
100 CAPSULE ORAL DAILY
COMMUNITY
End: 2019-11-16

## 2019-07-12 NOTE — PROGRESS NOTES
05/25/1998    Flu vaccine (1) 09/01/2019    A1C test (Diabetic or Prediabetic)  01/12/2020    Diabetic microalbuminuria test  01/12/2020    Lipid screen  01/12/2020    Potassium monitoring  01/12/2020    Creatinine monitoring  01/12/2020    Pneumococcal 0-64 years Vaccine  Aged Out       Subjective:  Review of Systems  General:   No fever, no chills, No fatigue or weight loss  Pulmonary:    No dyspnea, no wheezing  Cardiac:    Denies recent chest pain,   GI:     No nausea or vomiting, no abdominal pain  Neuro:    No dizziness or light headedness,   Musculoskeletal:  No recent active issues  Extremities:   No edema, good peripheral pulses      Objective:  Physical Exam  BP (!) 146/92   Pulse 62   Ht 5' 5\" (1.651 m)   Wt (!) 314 lb 3.2 oz (142.5 kg)   BMI 52.29 kg/m²   General:   Well developed, well nourished  Lungs:   Clear to auscultation  Heart:    Normal S1 S2, Slight murmur. no rubs, no gallops  Abdomen:   Soft, non tender, no organomegalies, positive bowel sounds  Extremities:   No edema, no cyanosis, good peripheral pulses  Neurological:   Awake, alert, oriented. No obvious focal deficits  Musculoskelatal:  No obvious deformities    Assessment:      Diagnosis Orders   1. Essential hypertension     2. Familial hypercholesterolemia     cardiac fair for now       Plan:  No follow-ups on file. As above  Continue risk factor modification and medical management  Thank you for allowing me to participate in the care of your patient. Please don't hesitate to contact me regarding any further issues related to the patient care    Orders Placed:  No orders of the defined types were placed in this encounter. Medications Prescribed:  No orders of the defined types were placed in this encounter. Discussed use, benefit, and side effects of prescribed medications. All patient questions answered. Pt voicedunderstanding. Instructed to continue current medications, diet and exercise.  Continue risk factor

## 2019-11-06 ENCOUNTER — HOSPITAL ENCOUNTER (OUTPATIENT)
Dept: GENERAL RADIOLOGY | Age: 44
Discharge: HOME | End: 2019-11-06
Payer: COMMERCIAL

## 2019-11-06 ENCOUNTER — HOSPITAL ENCOUNTER (OUTPATIENT)
Age: 44
Discharge: HOME | End: 2019-11-06
Payer: COMMERCIAL

## 2019-11-06 DIAGNOSIS — R10.84 ABDOMINAL PAIN, GENERALIZED: ICD-10-CM

## 2019-11-06 LAB
ANION GAP SERPL CALCULATED.3IONS-SCNC: 16 MEQ/L (ref 8–16)
BUN BLDV-MCNC: 17 MG/DL (ref 7–22)
CALCIUM SERPL-MCNC: 9.6 MG/DL (ref 8.5–10.5)
CHLORIDE BLD-SCNC: 100 MEQ/L (ref 98–111)
CO2: 25 MEQ/L (ref 23–33)
CREAT SERPL-MCNC: 0.6 MG/DL (ref 0.4–1.2)
GFR SERPL CREATININE-BSD FRML MDRD: > 90 ML/MIN/1.73M2
GLUCOSE FASTING: 179 MG/DL (ref 70–108)
POTASSIUM SERPL-SCNC: 4.2 MEQ/L (ref 3.5–5.2)
SODIUM BLD-SCNC: 141 MEQ/L (ref 135–145)

## 2019-11-06 PROCEDURE — 74018 RADEX ABDOMEN 1 VIEW: CPT

## 2019-11-06 PROCEDURE — 80048 BASIC METABOLIC PNL TOTAL CA: CPT

## 2019-11-06 PROCEDURE — 36415 COLL VENOUS BLD VENIPUNCTURE: CPT

## 2019-11-16 ENCOUNTER — HOSPITAL ENCOUNTER (EMERGENCY)
Age: 44
Discharge: HOME | End: 2019-11-17
Payer: COMMERCIAL

## 2019-11-16 ENCOUNTER — APPOINTMENT (OUTPATIENT)
Dept: CT IMAGING | Age: 44
End: 2019-11-16
Payer: COMMERCIAL

## 2019-11-16 DIAGNOSIS — R10.9 FLANK PAIN: Primary | ICD-10-CM

## 2019-11-16 DIAGNOSIS — M62.838 SPASM OF MUSCLE: ICD-10-CM

## 2019-11-16 LAB
ALBUMIN SERPL-MCNC: 4 G/DL (ref 3.5–5.1)
ALP BLD-CCNC: 86 U/L (ref 38–126)
ALT SERPL-CCNC: 39 U/L (ref 11–66)
ANION GAP SERPL CALCULATED.3IONS-SCNC: 11 MEQ/L (ref 8–16)
AST SERPL-CCNC: 20 U/L (ref 5–40)
BASOPHILS # BLD: 0.3 %
BASOPHILS ABSOLUTE: 0 THOU/MM3 (ref 0–0.1)
BILIRUB SERPL-MCNC: 0.3 MG/DL (ref 0.3–1.2)
BILIRUBIN URINE: NEGATIVE
BLOOD, URINE: NEGATIVE
BUN BLDV-MCNC: 10 MG/DL (ref 7–22)
CALCIUM SERPL-MCNC: 9 MG/DL (ref 8.5–10.5)
CHARACTER, URINE: CLEAR
CHLORIDE BLD-SCNC: 98 MEQ/L (ref 98–111)
CO2: 29 MEQ/L (ref 23–33)
COLOR: YELLOW
CREAT SERPL-MCNC: 0.7 MG/DL (ref 0.4–1.2)
EOSINOPHIL # BLD: 1.4 %
EOSINOPHILS ABSOLUTE: 0.1 THOU/MM3 (ref 0–0.4)
ERYTHROCYTE [DISTWIDTH] IN BLOOD BY AUTOMATED COUNT: 12.2 % (ref 11.5–14.5)
ERYTHROCYTE [DISTWIDTH] IN BLOOD BY AUTOMATED COUNT: 39.8 FL (ref 35–45)
GFR SERPL CREATININE-BSD FRML MDRD: > 90 ML/MIN/1.73M2
GLUCOSE BLD-MCNC: 132 MG/DL (ref 70–108)
GLUCOSE URINE: NEGATIVE MG/DL
HCT VFR BLD CALC: 36.9 % (ref 37–47)
HEMOGLOBIN: 12.5 GM/DL (ref 12–16)
IMMATURE GRANS (ABS): 0.04 THOU/MM3 (ref 0–0.07)
IMMATURE GRANULOCYTES: 0.4 %
KETONES, URINE: NEGATIVE
LEUKOCYTE ESTERASE, URINE: NEGATIVE
LYMPHOCYTES # BLD: 35.6 %
LYMPHOCYTES ABSOLUTE: 3.3 THOU/MM3 (ref 1–4.8)
MCH RBC QN AUTO: 30.7 PG (ref 26–33)
MCHC RBC AUTO-ENTMCNC: 33.9 GM/DL (ref 32.2–35.5)
MCV RBC AUTO: 90.7 FL (ref 81–99)
MONOCYTES # BLD: 6 %
MONOCYTES ABSOLUTE: 0.6 THOU/MM3 (ref 0.4–1.3)
NITRITE, URINE: NEGATIVE
NUCLEATED RED BLOOD CELLS: 0 /100 WBC
OSMOLALITY CALCULATION: 276.6 MOSMOL/KG (ref 275–300)
PH UA: 6.5 (ref 5–9)
PLATELET # BLD: 268 THOU/MM3 (ref 130–400)
PMV BLD AUTO: 10.7 FL (ref 9.4–12.4)
POTASSIUM SERPL-SCNC: 4 MEQ/L (ref 3.5–5.2)
PROTEIN UA: NEGATIVE
RBC # BLD: 4.07 MILL/MM3 (ref 4.2–5.4)
SEG NEUTROPHILS: 56.3 %
SEGMENTED NEUTROPHILS ABSOLUTE COUNT: 5.3 THOU/MM3 (ref 1.8–7.7)
SODIUM BLD-SCNC: 138 MEQ/L (ref 135–145)
SPECIFIC GRAVITY, URINE: 1.02 (ref 1–1.03)
TOTAL PROTEIN: 6.9 G/DL (ref 6.1–8)
UROBILINOGEN, URINE: 1 EU/DL (ref 0–1)
WBC # BLD: 9.4 THOU/MM3 (ref 4.8–10.8)

## 2019-11-16 PROCEDURE — 80053 COMPREHEN METABOLIC PANEL: CPT

## 2019-11-16 PROCEDURE — 6370000000 HC RX 637 (ALT 250 FOR IP): Performed by: NURSE PRACTITIONER

## 2019-11-16 PROCEDURE — 99284 EMERGENCY DEPT VISIT MOD MDM: CPT

## 2019-11-16 PROCEDURE — 74176 CT ABD & PELVIS W/O CONTRAST: CPT

## 2019-11-16 PROCEDURE — 81003 URINALYSIS AUTO W/O SCOPE: CPT

## 2019-11-16 PROCEDURE — 36415 COLL VENOUS BLD VENIPUNCTURE: CPT

## 2019-11-16 PROCEDURE — 85025 COMPLETE CBC W/AUTO DIFF WBC: CPT

## 2019-11-16 RX ORDER — CYCLOBENZAPRINE HCL 10 MG
10 TABLET ORAL ONCE
Status: COMPLETED | OUTPATIENT
Start: 2019-11-16 | End: 2019-11-16

## 2019-11-16 RX ADMIN — CYCLOBENZAPRINE 10 MG: 10 TABLET, FILM COATED ORAL at 23:25

## 2019-11-16 ASSESSMENT — PAIN SCALES - GENERAL: PAINLEVEL_OUTOF10: 7

## 2019-11-16 ASSESSMENT — PAIN DESCRIPTION - LOCATION: LOCATION: FLANK;BACK

## 2019-11-16 ASSESSMENT — PAIN DESCRIPTION - ORIENTATION: ORIENTATION: RIGHT

## 2019-11-17 VITALS
DIASTOLIC BLOOD PRESSURE: 68 MMHG | BODY MASS INDEX: 47.09 KG/M2 | HEIGHT: 66 IN | OXYGEN SATURATION: 97 % | SYSTOLIC BLOOD PRESSURE: 158 MMHG | RESPIRATION RATE: 17 BRPM | WEIGHT: 293 LBS | HEART RATE: 68 BPM | TEMPERATURE: 98 F

## 2019-11-17 RX ORDER — CYCLOBENZAPRINE HCL 10 MG
10 TABLET ORAL 3 TIMES DAILY PRN
Qty: 12 TABLET | Refills: 0 | Status: SHIPPED | OUTPATIENT
Start: 2019-11-17 | End: 2019-11-27

## 2019-11-17 RX ORDER — NAPROXEN 500 MG/1
500 TABLET ORAL 2 TIMES DAILY WITH MEALS
Qty: 24 TABLET | Refills: 0 | Status: SHIPPED | OUTPATIENT
Start: 2019-11-17 | End: 2020-09-18

## 2019-11-17 ASSESSMENT — PAIN DESCRIPTION - PAIN TYPE: TYPE: ACUTE PAIN

## 2019-11-17 ASSESSMENT — PAIN DESCRIPTION - FREQUENCY: FREQUENCY: INTERMITTENT

## 2019-11-17 ASSESSMENT — PAIN DESCRIPTION - ORIENTATION: ORIENTATION: RIGHT

## 2019-11-17 ASSESSMENT — PAIN DESCRIPTION - LOCATION: LOCATION: BACK;FLANK

## 2019-11-18 ASSESSMENT — ENCOUNTER SYMPTOMS
ABDOMINAL PAIN: 1
BACK PAIN: 1
RHINORRHEA: 0
NAUSEA: 0
COUGH: 0
VOMITING: 0
CHEST TIGHTNESS: 0

## 2020-01-21 ENCOUNTER — HOSPITAL ENCOUNTER (OUTPATIENT)
Age: 45
Discharge: HOME | End: 2020-01-21
Payer: COMMERCIAL

## 2020-01-21 LAB
BILIRUBIN URINE: NEGATIVE
BLOOD, URINE: NEGATIVE
CHARACTER, URINE: CLEAR
COLOR: YELLOW
GLUCOSE URINE: NEGATIVE MG/DL
KETONES, URINE: NEGATIVE
LEUKOCYTE ESTERASE, URINE: NEGATIVE
NITRITE, URINE: NEGATIVE
PH UA: 5 (ref 5–9)
PROTEIN UA: NEGATIVE
SPECIFIC GRAVITY, URINE: 1.03 (ref 1–1.03)
UROBILINOGEN, URINE: 1 EU/DL (ref 0–1)

## 2020-01-21 PROCEDURE — 81003 URINALYSIS AUTO W/O SCOPE: CPT

## 2020-07-14 ENCOUNTER — HOSPITAL ENCOUNTER (OUTPATIENT)
Age: 45
Discharge: HOME | End: 2020-07-14

## 2020-07-14 LAB — RUBELLA: 6.2 IU/ML

## 2020-07-17 LAB
MUMPS IGG TITER: 0.53
RUBEOLA IGG: 14.3 AU/ML
VZV IGG SER QL IA: 1.05

## 2020-08-28 ENCOUNTER — EMPLOYEE WELLNESS (OUTPATIENT)
Dept: OTHER | Age: 45
End: 2020-08-28

## 2020-09-18 ENCOUNTER — OFFICE VISIT (OUTPATIENT)
Dept: BARIATRICS/WEIGHT MGMT | Age: 45
End: 2020-09-18
Payer: COMMERCIAL

## 2020-09-18 VITALS
RESPIRATION RATE: 18 BRPM | HEIGHT: 65 IN | HEART RATE: 72 BPM | WEIGHT: 293 LBS | DIASTOLIC BLOOD PRESSURE: 76 MMHG | TEMPERATURE: 97.5 F | SYSTOLIC BLOOD PRESSURE: 128 MMHG | BODY MASS INDEX: 48.82 KG/M2

## 2020-09-18 PROCEDURE — 99203 OFFICE O/P NEW LOW 30 MIN: CPT | Performed by: SURGERY

## 2020-09-19 ASSESSMENT — ENCOUNTER SYMPTOMS
EYE PAIN: 0
SHORTNESS OF BREATH: 0
TROUBLE SWALLOWING: 0
PHOTOPHOBIA: 0
EYE DISCHARGE: 0
FACIAL SWELLING: 0
WHEEZING: 0
VOMITING: 0
BLOOD IN STOOL: 0
COLOR CHANGE: 0
EYE REDNESS: 0
CHOKING: 0
STRIDOR: 0
CHEST TIGHTNESS: 0
EYE ITCHING: 0
VOICE CHANGE: 0
RECTAL PAIN: 0
COUGH: 0
ANAL BLEEDING: 0
DIARRHEA: 1
ALLERGIC/IMMUNOLOGIC NEGATIVE: 1
NAUSEA: 0
CONSTIPATION: 0
SORE THROAT: 0
BACK PAIN: 1
RHINORRHEA: 0
ABDOMINAL DISTENTION: 0
APNEA: 0
ABDOMINAL PAIN: 0
SINUS PRESSURE: 0

## 2020-09-19 NOTE — PROGRESS NOTES
Subjective:      Patient ID: Marquis Sequeira is a 39 y.o. female. Chief Complaint   Patient presents with    Bariatric, Initial Visit     New Patietn 3 month req     HPI  Quynh Ferire is a 82-year-old female who presents for initial evaluation at the weight management program secondary to her morbid obesity. BMI 52. Current weight 319 pounds. She states she has multiple comorbid conditions including diabetes mellitus. Bang score 5. She admits she has been overweight most of her adult life. Worse after her children. She admits she is tried multiple times at weight loss but has never been successful in doing this long-term. She is tried medications multiple times as well as commercial programs such as [employer] and [employer] without success. She is tried low calorie diets as well as high-protein/low carbohydrate diets multiple times without long-term success for weight loss. She admits she needs to do a lifestyle long-term change in order to improve some of her medical problems but also prevent future ones. She states the excess body weight is affecting her physically. Lower back as well as lower extremity joint aching because of the excess body weight. Fatigue. She feels she is not able to do things physically because of her excess body weight. It is also affecting her mentally and socially. She would like to be there long-term for her family and feels in order to do that she needs to lose the weight long-term. Denies current chest or abdominal pain. Some urinary urgency. Some loose stools secondary to medications. No hematochezia or melena. She admits if she is not able to lose enough adequate excess body weight with medical management only she would like to proceed with a sleeve gastrectomy. Review of Systems   Constitutional: Positive for fatigue. Negative for activity change, appetite change, chills, diaphoresis, fever and unexpected weight change.    HENT: Positive for mouth sores. Negative for congestion, dental problem, drooling, ear discharge, ear pain, facial swelling, hearing loss, nosebleeds, postnasal drip, rhinorrhea, sinus pressure, sneezing, sore throat, tinnitus, trouble swallowing and voice change. Eyes: Negative for photophobia, pain, discharge, redness, itching and visual disturbance. Respiratory: Negative for apnea, cough, choking, chest tightness, shortness of breath, wheezing and stridor. Cardiovascular: Negative for chest pain, palpitations and leg swelling. Gastrointestinal: Positive for diarrhea. Negative for abdominal distention, abdominal pain, anal bleeding, blood in stool, constipation, nausea, rectal pain and vomiting. Endocrine: Negative. Genitourinary: Positive for urgency. Negative for decreased urine volume, difficulty urinating, dyspareunia, dysuria, enuresis, flank pain, frequency, genital sores, hematuria, menstrual problem, pelvic pain, vaginal bleeding, vaginal discharge and vaginal pain. Musculoskeletal: Positive for back pain. Negative for arthralgias, gait problem, joint swelling, myalgias, neck pain and neck stiffness. Skin: Negative for color change, pallor, rash and wound. Allergic/Immunologic: Negative. Neurological: Negative for dizziness, tremors, seizures, syncope, facial asymmetry, speech difficulty, weakness, light-headedness, numbness and headaches. Hematological: Negative for adenopathy. Does not bruise/bleed easily. Psychiatric/Behavioral: Positive for agitation. Negative for behavioral problems, confusion, decreased concentration, dysphoric mood, hallucinations, self-injury, sleep disturbance and suicidal ideas. The patient is not nervous/anxious and is not hyperactive.       Past Medical History:   Diagnosis Date    Back pain     Depression     Diabetes mellitus (HCC)     GERD (gastroesophageal reflux disease)     Hypercholesteremia     Hypertension     PONV (postoperative nausea and vomiting)     UTI (urinary tract infection)        Past Surgical History:   Procedure Laterality Date    CHOLECYSTECTOMY      ENDOMETRIAL ABLATION      HYSTERECTOMY ABDOMINAL N/A 12/12/2017    ROBOTIC TOTAL HYSTERECTOMY WITH BILATERAL SALPINGECTOMY performed by Artis Gunter MD at [facility]      vocal cord mass removed    OVARIAN CYST REMOVAL         Current Outpatient Medications   Medication Sig Dispense Refill    metFORMIN (GLUCOPHAGE) 500 MG tablet Take 1,000 mg by mouth daily       lisinopril (PRINIVIL;ZESTRIL) 10 MG tablet Take 1 tablet by mouth 2 times daily (Patient taking differently: Take 5 mg by mouth 2 times daily ) 60 tablet 6    metoprolol tartrate (LOPRESSOR) 25 MG tablet Take 50 mg by mouth 2 times daily       FLUoxetine (PROZAC) 20 MG capsule Take 60 mg by mouth daily        No current facility-administered medications for this visit.         No Known Allergies    Family History   Problem Relation Age of Onset    Obesity Mother     Heart Disease Father        Social History     Socioeconomic History    Marital status:      Spouse name: Not on file    Number of children: Not on file    Years of education: Not on file    Highest education level: Not on file   Occupational History    Not on file   Social Needs    Financial resource strain: Not on file    Food insecurity     Worry: Not on file     Inability: Not on file    Transportation needs     Medical: Not on file     Non-medical: Not on file   Tobacco Use    Smoking status: Never Smoker    Smokeless tobacco: Never Used   Substance and Sexual Activity    Alcohol use: No    Drug use: No    Sexual activity: Not on file   Lifestyle    Physical activity     Days per week: Not on file     Minutes per session: Not on file    Stress: Not on file   Relationships    Social connections     Talks on phone: Not on file     Gets together: Not on file     Attends Latter day service: Not on file     Active member of club or organization: Not on file     Attends meetings of clubs or organizations: Not on file     Relationship status: Not on file    Intimate partner violence     Fear of current or ex partner: Not on file     Emotionally abused: Not on file     Physically abused: Not on file     Forced sexual activity: Not on file   Other Topics Concern    Not on file   Social History Narrative    Not on file     Vitals:    09/18/20 1442   BP: 128/76   Pulse: 72   Resp: 18   Temp: 97.5 °F (36.4 °C)     Body mass index is 52.68 kg/m². Wt Readings from Last 3 Encounters:   09/18/20 (!) 319 lb (144.7 kg)   08/28/20 (!) 322 lb (146.1 kg)   11/16/19 (!) 320 lb (145.2 kg)     Objective:   Physical Exam  Vitals signs reviewed. Constitutional:       General: She is not in acute distress. Appearance: She is well-developed. She is not diaphoretic. HENT:      Head: Normocephalic and atraumatic. Right Ear: External ear normal.      Left Ear: External ear normal.      Nose: Nose normal.   Eyes:      General: No scleral icterus. Right eye: No discharge. Left eye: No discharge. Conjunctiva/sclera: Conjunctivae normal.   Neck:      Musculoskeletal: Normal range of motion and neck supple. Cardiovascular:      Rate and Rhythm: Normal rate and regular rhythm. Heart sounds: Normal heart sounds. Pulmonary:      Effort: Pulmonary effort is normal. No respiratory distress. Breath sounds: Normal breath sounds. No wheezing or rales. Chest:      Chest wall: No tenderness. Abdominal:      General: Bowel sounds are normal. There is no distension. Palpations: Abdomen is soft. There is no mass. Tenderness: There is no abdominal tenderness. There is no guarding or rebound. Musculoskeletal: Normal range of motion. General: No tenderness. Skin:     General: Skin is warm and dry. Coloration: Skin is not pale. Findings: No erythema or rash.    Neurological:      Mental Status: She is FERRITIN  Lab Results   Component Value Date    FERRITIN 134 01/12/2019     VITAMIN A  No results found for: RETINOL  NICOTINE  No results found for: NMET  UDS  No results found for: UDP  PSA  No results found for: LABPSA  GFR  Lab Results   Component Value Date    LABGLOM >90 11/16/2019     DEXA  No results found for this or any previous visit. Imaging - none    Patient Active Problem List   Diagnosis    Dysmenorrhea    Menorrhagia with irregular cycle    Morbid obesity due to excess calories (HCC)     Assessment:      1. Morbid obesity (BMI 52)  2. Diabetes mellitus  3. Lower back pain  4. GERD  5. Hypercholesterolemia  6. Depression  7. Hypertension      Plan:      1. Long discussion about the pros and cons of weight loss surgery. The risks benefits and alternatives to laparoscopic adjustable band, gastric sleeve and gastric Zain-en-Y bypass were discussed in detail. The pros and cons of robotic assisted, laparoscopic and open techniques were discussed. 2.  Behavior modification discussed in detail in regards to dietary habits. 3.  Nutritional education occurred during visit. Will set up a consultation/evaluation with dietitian for further evaluation. 4.  Options for medical management of morbid obesity discussed. 5.  Improvement in fitness/exercise discussed with patient and the need for this with/without surgery. 6.  Obtain medical necessity letter from PCP as needed. 7.  Follow-up in one month at weight management program at [facility]. 8.  Signs and symptoms reviewed with patient that would be concerning and need her to return to office for re-evaluation. Patient states she will call if she has questions or concerns. 9. Multivitamin  10. Psychology evaluation  11. EGD prior to any surgical intervention  12. Encouraged support groups  13. Encouraged Naturally Slim/Rev It Up  14.   Discussed the pros and cons along with possible side effects/complications of weight loss medications. All questions answered. Patient states that if she is not able to lose enough adequate excess body weight with medical management only then she would be like to proceed with surgical intervention such as sleeve gastrectomy/gastric bypass for further weight loss. More than 30 minutes spent with patient today. Greater than 50% of the time was involved counseling, educaton and coordinating care.           Maritza Zamudio MD

## 2020-10-19 VITALS — WEIGHT: 293 LBS | BODY MASS INDEX: 51.97 KG/M2

## 2020-10-28 NOTE — PROGRESS NOTES
Patient is a 39 y.o. female seen for   initial  MNT visit for  pre op bariatric surgery desires sleeve    Vitals:    10/30/20 1459   Temp: 93.9 °F (34.4 °C)    BMI: Body mass index is 52.35 kg/m². Obesity Classification: Class III    Weight History: Wt Readings from Last 3 Encounters:   10/30/20 (!) 317 lb (143.8 kg)   09/18/20 (!) 319 lb (144.7 kg)   08/28/20 (!) 322 lb (146.1 kg)     Hx of DM- requires to establish new PCP. States now only Taking only one Metformin in evening because it causes GI distress. Checking blood sugars ~ - once every couple days  Patient has not had a mammogram with the past year. - Will place Referral to [facility]  Pt will get a new PCP established. Informed pt of requiring Pulmonary Appointment and phone number provided. Patient is taking a Multivitamin daily:  Pt does Take a MVI - Women's [employer]    Describe your current weight: \"I have always been heavy \", increased back pain. How does your weight affect your daily activities? concern over medical problems, lack of energy     Patient's lowest adult weight was 185 lbs at age 26. The lowest weight was achieved through  more active and lost 50 lbs for getting , diet pills. Patient was at her lowest weight for 1 year. Reports gradual weight gain over the years with each pregnancy. Has stayed at home last 18 years and most recently started working outside of the house in last 3 months  Patient's highest adult weight was 319 lbs at age 39. Patient was at her highest weight for 2 years. Patient's triggers/known causes to her highest weight are eating habits, sedentary lifestyle. Patient has participated in the following weight loss programs: Weight Watchers, and Slim Fast.  Patient has participated in meal replacement/liquid diets. Patient has participated in weight loss medications. - Thermolift many years ago    Patient is not lactose intolerant.   Patient does not have Moravian/cultural food concerns. Patient does not have food allergies. Patient dines out to a sit down restaurant 1 times per week. Patient has fast food or picks up carry out 1 times per week. Grocery Shopping in household done by: self  Cooking in household done by: self    Working day shift 5:45a-2 or as last as 8:34a-11pm.  Three kids and pt, spouse in house  24 hour recall/food frequency chart:  Breakfast: yes at least by 8:30a-9:00a. This am had a sausage biscuit or goes to cafe and gets cisneros  Snack: no.  Lunch: yes. 11a-1pm- packs foods for work- leftovers, salads with crackers and honey mustard dressing, if goes to cafeteria- BLT salads, burger or wraps  Snack: not at work  Dinner: yes. 5:30p-6:00p- last nite had pork roast, roasted veggies, and limits bread due to child with Celiac Disease  Snack: Doesn't snack every nite. May have microwave  popcorn, sugar free candies  Drinks throughout the day: diet pepsi cans one per day, three bottles water a day, Unsweet Passion Tea , Gatorade Zero    Do you drink alcohol? No.     Patient does not meet the criteria for binge eating disorder. Patient does have history grazing. Patient does not have night eating. Patient does sometimes have a history of emotional eating or eating out of boredom. - thinks may eat out of boredom    It does take an unusually large amount of food for the patient to feel comfortably full. Patient describes self as fast eater      Patient describes level of activity as sedentary. Tracking steps on Apple Watch averaging 7,000-10,000 steps and less on days off.    Patient will plan to attend Fitness Orientation on: November 19th at [facility] willing to start [employer] for increased physical activity    Assessment  Nutritional Needs: Gowen-St Jeor = 2112 kcal x 1.2 (activity factor)= 2534 kcal - 500-1000 calories/day  (for 1-2 lb weight loss/week)= 3540-0022 calories per day  Food recall reveals larger portion sizes, increased higher fat foods. PES Statement:  Overweight/Obesity related to lack of exercise, sedentary lifestyle, unhealthy eating habits, and unsuccessful diet attempts as evidenced by. Body mass index is 52.35 kg/m². .    Surgery  Surgical procedure desired: gastric sleeve  Patient's greatest concern about having surgery is: Pt without significant concerns. Patient describes the motivation for weight loss surgery to be: \"just to be healthy and show my kids healthy habits\". The expectations following the surgery were reviewed in detail with patient today and patient made aware will require to eliminate carbonated beverages, improve meal planning with balanced meals, and portion control to assist with weight loss. .   she does feel she can deal with the dietary restrictions. Patient states she does understand the consequences of not complying with post-op food guidelines. Patient states she understands the long term changes in food intake that will be necessary for all occasions after surgery for the rest of her life. Patient is deemed nutritionally appropriate to proceed if able to show progress towards nutrition behavior changes discussed today. Plan  Patient will begin working on recommendations from Pre-Weight Loss Surgery Behavior Change Goal Sheet that was reviewed today to assist with weight loss and establish a  long term healthy eating pattern. Individual goals set with patient to be reviewed at monthly office visits. Weight loss goal of -5% from initial weight at first visit with Dr Bryson Kasper reviewed with patient to achieve over 3 month period per insurance requirements. Initial weight was: 319lbs   -5% Weight Loss goal will be minimum of: 16 lbs weight loss. Plan/Recommendations:  My Plate Method, Portion Size Guide, Online Support Groups, Food Journal    Goals:  1. I will attend Fitness Orientation on Thursday November 19th at 4:[facility]pm [facility]  2.  I will get the lab work done that is mailed to my mailing address before next office visit. You will need to fast 10-12 hours for this (no food or drink besides water). 3.  I will aim for at least 64 oz of water each day (= 8 cups per day or four bottled wolf)  4. I will use my food journal to record meal times, serving sizes and bring back to next dietitian visit. 5.  I will increase my physical activity by using [facility] 2-3 days a week  6. Initial weight loss goal of -5% is recommended over 3 month period. This is a minimum of: 16 lbs from your weight when initially met with physician of: 319 lbs. -Followup visit: 4 weeks with dietitian.    Total time involved in direct patient education: 45 minutes    Cali0 MISA Reeder  Dietitian- [facility]

## 2020-10-30 ENCOUNTER — OFFICE VISIT (OUTPATIENT)
Dept: BARIATRICS/WEIGHT MGMT | Age: 45
End: 2020-10-30

## 2020-10-30 VITALS — HEIGHT: 65 IN | TEMPERATURE: 93.9 F | WEIGHT: 293 LBS | BODY MASS INDEX: 48.82 KG/M2

## 2020-10-30 NOTE — PATIENT INSTRUCTIONS
Goals: 1. I will attend Fitness Orientation on Thursday November 19th at 4:00pm in [facility]  2. I will get the lab work done that is mailed to my mailing address before next office visit. You will need to fast 10-12 hours for this (no food or drink besides water). 3.  I will aim for at least 64 oz of water each day (= 8 cups per day or four bottled wolf)  4. I will use my food journal to record meal times, serving sizes and bring back to next dietitian visit. 5.  I will increase my physical activity by using [facility] 2-3 days a week  6. Initial weight loss goal of -5% is recommended over 3 month period. This is a minimum of: 16 lbs from your weight when initially met with physician of: 319 lbs.

## 2020-11-11 ENCOUNTER — OFFICE VISIT (OUTPATIENT)
Dept: FAMILY MEDICINE CLINIC | Age: 45
End: 2020-11-11
Payer: COMMERCIAL

## 2020-11-11 VITALS
SYSTOLIC BLOOD PRESSURE: 140 MMHG | WEIGHT: 293 LBS | BODY MASS INDEX: 47.09 KG/M2 | HEART RATE: 64 BPM | DIASTOLIC BLOOD PRESSURE: 82 MMHG | TEMPERATURE: 97.4 F | RESPIRATION RATE: 16 BRPM | HEIGHT: 66 IN

## 2020-11-11 PROBLEM — I10 ESSENTIAL HYPERTENSION: Status: ACTIVE | Noted: 2020-11-11

## 2020-11-11 PROBLEM — N92.1 MENORRHAGIA WITH IRREGULAR CYCLE: Status: RESOLVED | Noted: 2017-12-12 | Resolved: 2020-11-11

## 2020-11-11 PROBLEM — F33.42 RECURRENT MAJOR DEPRESSIVE DISORDER, IN FULL REMISSION (HCC): Status: ACTIVE | Noted: 2020-11-11

## 2020-11-11 PROBLEM — E66.01 MORBID OBESITY DUE TO EXCESS CALORIES (HCC): Status: RESOLVED | Noted: 2017-12-12 | Resolved: 2020-11-11

## 2020-11-11 PROBLEM — N94.6 DYSMENORRHEA: Status: RESOLVED | Noted: 2017-12-12 | Resolved: 2020-11-11

## 2020-11-11 PROBLEM — E66.01 MORBID OBESITY WITH BMI OF 50.0-59.9, ADULT (HCC): Status: ACTIVE | Noted: 2020-11-11

## 2020-11-11 PROBLEM — E11.9 TYPE 2 DIABETES MELLITUS WITHOUT COMPLICATION, WITHOUT LONG-TERM CURRENT USE OF INSULIN (HCC): Status: ACTIVE | Noted: 2020-11-11

## 2020-11-11 PROCEDURE — 99386 PREV VISIT NEW AGE 40-64: CPT | Performed by: NURSE PRACTITIONER

## 2020-11-11 RX ORDER — LISINOPRIL AND HYDROCHLOROTHIAZIDE 20; 12.5 MG/1; MG/1
1 TABLET ORAL DAILY
Qty: 90 TABLET | Refills: 3 | Status: SHIPPED | OUTPATIENT
Start: 2020-11-11 | End: 2022-01-26

## 2020-11-11 RX ORDER — LISINOPRIL 10 MG/1
10 TABLET ORAL 2 TIMES DAILY
Qty: 60 TABLET | Refills: 6 | Status: CANCELLED | OUTPATIENT
Start: 2020-11-11

## 2020-11-11 ASSESSMENT — ENCOUNTER SYMPTOMS
SHORTNESS OF BREATH: 0
NAUSEA: 0
ABDOMINAL PAIN: 0
COUGH: 0

## 2020-11-11 NOTE — PROGRESS NOTES
Subjective:      Patient ID: Taylor Trivedi is a 39 y.o. female. HPI: NP to establish care. Former PCP was Dr. Dania Luna    Past Medical History:   Diagnosis Date    Back pain     Depression     [facility] ([facility]     GERD (gastroesophageal reflux disease)     Hypercholesteremia     Hypertension     PONV (postoperative nausea and vomiting)     UTI (urinary tract infection)        Past Surgical History:   Procedure Laterality Date    CHOLECYSTECTOMY      ENDOMETRIAL ABLATION      HYSTERECTOMY ABDOMINAL N/A 2017    ROBOTIC TOTAL HYSTERECTOMY WITH BILATERAL SALPINGECTOMY performed by Lis Davis [facility] at [facility]      vocal cord mass removed    OVARIAN CYST REMOVAL         Family History   Problem Relation Age of Onset    Obesity Mother     Heart Disease Father     No Known Problems Sister     No Known Problems Brother        Current Outpatient Medications   Medication Sig Dispense Refill    Multiple Vitamins-Minerals (MULTIVITAMIN ADULT PO) Take by mouth      lisinopril-hydroCHLOROthiazide (PRINZIDE;ZESTORETIC) 20-12.5 MG per tablet Take 1 tablet by mouth daily 90 tablet 3    metFORMIN (GLUCOPHAGE) 500 MG tablet Take 1,000 mg by mouth daily       FLUoxetine (PROZAC) 20 MG capsule Take 60 mg by mouth daily        No current facility-administered medications for this visit.         Smoke: None    Chief Complaint   Patient presents with   Lylia Apgar Doctor     previous pt of Dr. Eileen Wilkerson Medications     diabetic meds causing diarrhea, cannot work like that       Weight Management - [facility] - Dr. Malka Lal    Patient Active Problem List   Diagnosis    Type 2 diabetes mellitus without complication, without long-term current use of insulin ([address]    Essential hypertension    Morbid obesity with BMI of 50.0-59.9, adult ([address]    Recurrent major depressive disorder, in full remission ([address]     Denies CP, SOB or chest tightness    Wt Readings from Last 3 Encounters:   11/11/20 (!) 319 lb (144.7 kg)   10/30/20 (!) 317 lb (143.8 kg)   09/18/20 (!) 319 lb (144.7 kg)       On Lisinopril 5 mg BID and Metoprolol 50 mg daily. BP Readings from Last 3 Encounters:   11/11/20 (!) 140/82   09/18/20 128/76   11/17/19 (!) 158/68       DM. Metformin 500 mg HS due to causing diarrhea.      Lab Results   Component Value Date    LABA1C 7.2 (H) 01/12/2019    LABA1C 6.3 08/11/2017    LABA1C 5.8 04/25/2016     No results found for: EAG    No components found for: CHLPL  Lab Results   Component Value Date    TRIG 354 (H) 01/12/2019    TRIG 310 (H) 08/11/2017    TRIG 227 (H) 08/05/2014     Lab Results   Component Value Date    HDL 45 01/12/2019    HDL 40 08/11/2017    HDL 40 08/05/2014     Lab Results   Component Value Date    LDLCALC 137 01/12/2019    LDLCALC 128 08/11/2017    LDLCALC 141 08/05/2014     No results found for: LABVLDL      Chemistry        Component Value Date/Time     11/16/2019 2321    K 4.0 11/16/2019 2321    CL 98 11/16/2019 2321    CO2 29 11/16/2019 2321    BUN 10 11/16/2019 2321    CREATININE 0.7 11/16/2019 2321        Component Value Date/Time    CALCIUM 9.0 11/16/2019 2321    ALKPHOS 86 11/16/2019 2321    AST 20 11/16/2019 2321    ALT 39 11/16/2019 2321    BILITOT 0.3 11/16/2019 2321            Lab Results   Component Value Date    TSH 2.880 01/12/2019       Lab Results   Component Value Date    WBC 9.4 11/16/2019    HGB 12.5 11/16/2019    HCT 36.9 (L) 11/16/2019    MCV 90.7 11/16/2019     11/16/2019         Health Maintenance   Topic Date Due    Pneumococcal 0-64 years Vaccine (1 of 1 - PPSV23) 05/25/1983    Diabetic foot exam  05/25/1987    Diabetic retinal exam  05/25/1987    HIV screen  05/25/1992    Hepatitis B vaccine (1 of 3 - Risk 3-dose series) 05/25/1996    DTaP/Tdap/Td vaccine (1 - Tdap) 05/25/1996    Cervical cancer screen  05/25/1998    A1C test (Diabetic or Prediabetic)  01/12/2020    Diabetic microalbuminuria test  01/12/2020    Lipid screen  01/12/2020    Potassium monitoring  11/16/2020    Creatinine monitoring  11/16/2020    Flu vaccine  Completed    Hepatitis A vaccine  Aged Out    Hib vaccine  Aged Out    Meningococcal (ACWY) vaccine  Aged [employer] History   Administered Date(s) Administered    Influenza Virus Vaccine 10/15/2020           Review of Systems   Constitutional: Negative for chills and fever. HENT: Negative. Respiratory: Negative for cough and shortness of breath. Cardiovascular: Negative for chest pain. Gastrointestinal: Negative for abdominal pain and nausea. Skin: Negative for rash. Neurological: Negative for dizziness, light-headedness and headaches. Psychiatric/Behavioral: Negative. Objective:   Physical Exam  Constitutional:       General: She is not in acute distress. Eyes:      Pupils: Pupils are equal, round, and reactive to light. Neck:      Musculoskeletal: Normal range of motion and neck supple. Cardiovascular:      Rate and Rhythm: Normal rate and regular rhythm. Heart sounds: No murmur. Pulmonary:      Effort: Pulmonary effort is normal.      Breath sounds: Normal breath sounds. No wheezing. Abdominal:      General: Bowel sounds are normal. There is no distension. Palpations: Abdomen is soft. Tenderness: There is no abdominal tenderness. Musculoskeletal: Normal range of motion. General: No tenderness. Skin:     General: Skin is warm and dry. Findings: No rash. Assessment:       Diagnosis Orders   1. Well adult exam     2. Type 2 diabetes mellitus without complication, without long-term current use of insulin ([address]     3. Essential hypertension  lisinopril-hydroCHLOROthiazide (PRINZIDE;ZESTORETIC) 20-12.5 MG per tablet   4. Morbid obesity with BMI of 50.0-59.9, adult ([address]     5.  Recurrent major depressive disorder, in full remission ([address]             Plan:      Pmhx reviewed  Labs pending from Weight Management with A1C and LIPID  Discussion on DM medication options   - await to switch Metformin to GLP-1 class   - await to add on STATIN  Stop Metoprol, start Prinzide 20-12.5 mg Daily for BP  Diet and exercises discussed  RTO in 3 or 6 months depending on A1C        THADDEUS Vides - CNP

## 2020-11-14 ENCOUNTER — HOSPITAL ENCOUNTER (OUTPATIENT)
Age: 45
Discharge: HOME | End: 2020-11-14
Payer: COMMERCIAL

## 2020-11-14 LAB
ALBUMIN SERPL-MCNC: 4.3 G/DL (ref 3.5–5.1)
ALP BLD-CCNC: 76 U/L (ref 38–126)
ALT SERPL-CCNC: 49 U/L (ref 11–66)
ANION GAP SERPL CALCULATED.3IONS-SCNC: 11 MEQ/L (ref 8–16)
APTT: 30.6 SECONDS (ref 22–38)
AST SERPL-CCNC: 27 U/L (ref 5–40)
AVERAGE GLUCOSE: 132 MG/DL (ref 70–126)
BASOPHILS # BLD: 0.4 %
BASOPHILS ABSOLUTE: 0 THOU/MM3 (ref 0–0.1)
BILIRUB SERPL-MCNC: 0.6 MG/DL (ref 0.3–1.2)
BUN BLDV-MCNC: 16 MG/DL (ref 7–22)
CALCIUM SERPL-MCNC: 9.2 MG/DL (ref 8.5–10.5)
CHLORIDE BLD-SCNC: 102 MEQ/L (ref 98–111)
CHOLESTEROL, TOTAL: 240 MG/DL (ref 100–199)
CO2: 24 MEQ/L (ref 23–33)
CREAT SERPL-MCNC: 0.6 MG/DL (ref 0.4–1.2)
EOSINOPHIL # BLD: 1.3 %
EOSINOPHILS ABSOLUTE: 0.1 THOU/MM3 (ref 0–0.4)
ERYTHROCYTE [DISTWIDTH] IN BLOOD BY AUTOMATED COUNT: 11.9 % (ref 11.5–14.5)
ERYTHROCYTE [DISTWIDTH] IN BLOOD BY AUTOMATED COUNT: 38.9 FL (ref 35–45)
FERRITIN: 111 NG/ML (ref 10–291)
FOLATE: > 20 NG/ML (ref 4.8–24.2)
GFR SERPL CREATININE-BSD FRML MDRD: > 90 ML/MIN/1.73M2
GLUCOSE BLD-MCNC: 139 MG/DL (ref 70–108)
HBA1C MFR BLD: 6.4 % (ref 4.4–6.4)
HCT VFR BLD CALC: 41.1 % (ref 37–47)
HDLC SERPL-MCNC: 42 MG/DL
HEMOGLOBIN: 13.9 GM/DL (ref 12–16)
IMMATURE GRANS (ABS): 0.04 THOU/MM3 (ref 0–0.07)
IMMATURE GRANULOCYTES: 0.5 %
INR BLD: 0.9 (ref 0.85–1.13)
IRON: 95 UG/DL (ref 50–170)
LDL CHOLESTEROL CALCULATED: 143 MG/DL
LYMPHOCYTES # BLD: 31.6 %
LYMPHOCYTES ABSOLUTE: 2.7 THOU/MM3 (ref 1–4.8)
MCH RBC QN AUTO: 30.6 PG (ref 26–33)
MCHC RBC AUTO-ENTMCNC: 33.8 GM/DL (ref 32.2–35.5)
MCV RBC AUTO: 90.5 FL (ref 81–99)
MONOCYTES # BLD: 5 %
MONOCYTES ABSOLUTE: 0.4 THOU/MM3 (ref 0.4–1.3)
NUCLEATED RED BLOOD CELLS: 0 /100 WBC
PLATELET # BLD: 276 THOU/MM3 (ref 130–400)
PMV BLD AUTO: 11.4 FL (ref 9.4–12.4)
POTASSIUM SERPL-SCNC: 4.2 MEQ/L (ref 3.5–5.2)
PREALBUMIN: 26.9 MG/DL (ref 20–40)
PTH INTACT: 53.6 PG/ML (ref 15–65)
RBC # BLD: 4.54 MILL/MM3 (ref 4.2–5.4)
SEG NEUTROPHILS: 61.2 %
SEGMENTED NEUTROPHILS ABSOLUTE COUNT: 5.1 THOU/MM3 (ref 1.8–7.7)
SODIUM BLD-SCNC: 137 MEQ/L (ref 135–145)
TOTAL IRON BINDING CAPACITY: 304 UG/DL (ref 171–450)
TOTAL PROTEIN: 7.1 G/DL (ref 6.1–8)
TRIGL SERPL-MCNC: 274 MG/DL (ref 0–199)
TSH SERPL DL<=0.05 MIU/L-ACNC: 2.57 UIU/ML (ref 0.4–4.2)
VITAMIN B-12: 415 PG/ML (ref 211–911)
VITAMIN D 25-HYDROXY: 35 NG/ML (ref 30–100)
WBC # BLD: 8.4 THOU/MM3 (ref 4.8–10.8)

## 2020-11-14 PROCEDURE — 83970 ASSAY OF PARATHORMONE: CPT

## 2020-11-14 PROCEDURE — 82728 ASSAY OF FERRITIN: CPT

## 2020-11-14 PROCEDURE — 83036 HEMOGLOBIN GLYCOSYLATED A1C: CPT

## 2020-11-14 PROCEDURE — 84425 ASSAY OF VITAMIN B-1: CPT

## 2020-11-14 PROCEDURE — 36415 COLL VENOUS BLD VENIPUNCTURE: CPT

## 2020-11-14 PROCEDURE — 85730 THROMBOPLASTIN TIME PARTIAL: CPT

## 2020-11-14 PROCEDURE — 82607 VITAMIN B-12: CPT

## 2020-11-14 PROCEDURE — 84134 ASSAY OF PREALBUMIN: CPT

## 2020-11-14 PROCEDURE — 83540 ASSAY OF IRON: CPT

## 2020-11-14 PROCEDURE — G0480 DRUG TEST DEF 1-7 CLASSES: HCPCS

## 2020-11-14 PROCEDURE — 80307 DRUG TEST PRSMV CHEM ANLYZR: CPT

## 2020-11-14 PROCEDURE — 80053 COMPREHEN METABOLIC PANEL: CPT

## 2020-11-14 PROCEDURE — 80061 LIPID PANEL: CPT

## 2020-11-14 PROCEDURE — 82306 VITAMIN D 25 HYDROXY: CPT

## 2020-11-14 PROCEDURE — 83550 IRON BINDING TEST: CPT

## 2020-11-14 PROCEDURE — 85025 COMPLETE CBC W/AUTO DIFF WBC: CPT

## 2020-11-14 PROCEDURE — 82746 ASSAY OF FOLIC ACID SERUM: CPT

## 2020-11-14 PROCEDURE — 85610 PROTHROMBIN TIME: CPT

## 2020-11-14 PROCEDURE — 84443 ASSAY THYROID STIM HORMONE: CPT

## 2020-11-14 PROCEDURE — 84590 ASSAY OF VITAMIN A: CPT

## 2020-11-16 LAB — URINE DRUG PROFILE: NORMAL

## 2020-11-18 ENCOUNTER — TELEPHONE (OUTPATIENT)
Dept: FAMILY MEDICINE CLINIC | Age: 45
End: 2020-11-18

## 2020-11-18 LAB
3-OH-COTININE: < 2 NG/ML
COTININE: < 2 NG/ML
NICOTINE: < 2 NG/ML
RETINOL (VITAMIN A): NORMAL
VITAMIN B1 WHOLE BLOOD: 144 NMOL/L (ref 70–180)

## 2020-11-18 RX ORDER — ATORVASTATIN CALCIUM 20 MG/1
20 TABLET, FILM COATED ORAL DAILY
Qty: 90 TABLET | Refills: 1 | Status: SHIPPED | OUTPATIENT
Start: 2020-11-18 | End: 2021-05-17

## 2020-11-18 NOTE — TELEPHONE ENCOUNTER
Patient notified and understanding voiced. C/O diarrhea with Metformin and requesting alternative if able. Also willing to do STATIN.   Pharmacy is [facility]. [address]  Will check with pharmacy after 4pm regarding

## 2020-11-18 NOTE — PROGRESS NOTES
Assessment: Patient is a 39 y.o. female seen for   month one  follow up MNT visit for  pre op bariatric surgery desires sleeve    Vitals from current and previous visits:  Vitals 17/00/9263   SYSTOLIC    DIASTOLIC    Site    Position    Cuff Size    Pulse    Temp 97.9   Resp    SpO2    Weight 320 lb 6.4 oz   Height 5' 5.25\"   Body mass index 52.9 kg/m2   Pain Level    Waist (Inches)    Neck circumference        Initial weight at start of Weight Management Program was: 319 lbs     Corinne Garneran gained 3 lbs over one month  -Weight goal: lose weight.     -Nutritionally relevant labs: Initial labs noted : labs-A1C-6.4%, TG-274, chol-240, LDL-143, vit D-35, glucose-139  Lab Results   Component Value Date/Time    LABA1C 6.4 11/14/2020 07:29 AM    LABA1C 7.2 (H) 01/12/2019 10:50 AM    LABA1C 6.3 08/11/2017 09:19 AM    GLUCOSE 139 (H) 11/14/2020 07:29 AM    GLUCOSE 132 (H) 11/16/2019 11:21 PM    CHOL 240 (H) 11/14/2020 07:29 AM    HDL 42 11/14/2020 07:29 AM    LDLCALC 143 11/14/2020 07:29 AM    TRIG 274 (H) 11/14/2020 07:29 AM                  Lab Results   Component Value Date    VITD25 35 11/14/2020     Pt starting on statin by PCP due to elevated lipids and DM medication is changing  - Is patient taking daily Multivitamin:  Pt does Take a MVI - Women's Equate Brand, Vitamin D3 daily 3,000IU's   AVERY eval is 2/1/2021  -Food Recall: Working day shift 5:45a-2 or as last as 8:34a-11pm.  Three kids and pt, spouse in house  Pt had food journal in office. Has airfryer at home. Breakfast: two eggs, banana or two eggs with sausage crumbles Sravanthi Grosser  Lunch: salad with ham, cheese, peppers, and 2tbsp dressing or chicken breast, broccoli, mushrooms, rice or nachos with chicken, letuuce. Dinner: pepperoni, cheese chunks. Snacks: may have cottage cheese after dinner or midafternoon or may have some almonds. Main Beverages: has cut out diet pepsi this month, Unsweet Passion Tea , Gatorade Zero.   Using a one liter bottle at least twice a day with water and Powerade Zero  -Impression of Dietary Intake: on average, 3 meals per day. - Pt  is working towards avoiding high fat/high sugar foods. Pt  is working towards including protein at meals and snacks. Exercise:  Patient has not attended Fitness Orientation- on hold right now  -Physical Activity is:   Tracking steps on Apple Watch averaging ~ 6,000-7,000 steps a day and usually less on days off work. Nutrition Diagnosis: Overweight/Obesity related to currently undergoing MNT as evidenced by fBody mass index is 52.91 kg/m². .    Intervention:   Healthy behaviors: consistently logging food intake, eliminated carbonated beverages and adequate fluid intake  Patient has not attended support groups online yet. Has begun putting effort towards nutrition behaviors recommended for bariatric surgery. Pt asking appropriate questions during visit. Monthly goals and educational handouts reviewed with pt today. Handouts given: Top Notch Protein Foods and Reducing Fat and Calories in Meal Planning  . Patient Instructions   Goals:  1. Nutrition Goal:  I will continue to use my food journal to record meal times, serving sizes and bring back to next dietitian visit  2. Water Goal:  I will make sure drinking at least 64 oz of water a day or greater- great job with this! 3. Exercise Goal:  I will walk outside with  for ~ 30-40 minutes at least once a week  4. Take a look at You Tube Videos online that were given to you for Support Groups. -Followup visit: 4 weeks with dietitian.      Total time involved in direct patient education: 30 minutes    Yosvany Pleitez RD, LD  Dietitian- [facility]

## 2020-11-18 NOTE — TELEPHONE ENCOUNTER
[facility] from [facility]. A1C of 6.4 which is very good considering she is only on Metformin 500 mg HS only. With her pursing weight loss and A1C that low we can stick with the Metformin at night only and as we lose the weight we can drop the Metformin. If Metformin GI SE too much we can switch. Cholesterol is high to the point of I recommend being on STATIN. This too can be dropped as we lose the weight.

## 2020-11-18 NOTE — TELEPHONE ENCOUNTER
Pt returned call and left vm asking for a return call back at work ext [phone]. I phoned that number, transferred to Ciera Henderson but the phone just rings and rings. No option to leave a vm.

## 2020-11-19 ENCOUNTER — OFFICE VISIT (OUTPATIENT)
Dept: BARIATRICS/WEIGHT MGMT | Age: 45
End: 2020-11-19

## 2020-11-19 VITALS — WEIGHT: 293 LBS | TEMPERATURE: 97.9 F | HEIGHT: 65 IN | BODY MASS INDEX: 48.82 KG/M2

## 2020-11-23 ENCOUNTER — TELEPHONE (OUTPATIENT)
Dept: FAMILY MEDICINE CLINIC | Age: 45
End: 2020-11-23

## 2020-11-23 ENCOUNTER — HOSPITAL ENCOUNTER (OUTPATIENT)
Age: 45
Setting detail: SPECIMEN
Discharge: HOME | End: 2020-11-23
Payer: COMMERCIAL

## 2020-11-23 ENCOUNTER — VIRTUAL VISIT (OUTPATIENT)
Dept: FAMILY MEDICINE CLINIC | Age: 45
End: 2020-11-23
Payer: COMMERCIAL

## 2020-11-23 PROCEDURE — 99213 OFFICE O/P EST LOW 20 MIN: CPT | Performed by: NURSE PRACTITIONER

## 2020-11-23 PROCEDURE — U0003 INFECTIOUS AGENT DETECTION BY NUCLEIC ACID (DNA OR RNA); SEVERE ACUTE RESPIRATORY SYNDROME CORONAVIRUS 2 (SARS-COV-2) (CORONAVIRUS DISEASE [COVID-19]), AMPLIFIED PROBE TECHNIQUE, MAKING USE OF HIGH THROUGHPUT TECHNOLOGIES AS DESCRIBED BY CMS-2020-01-R: HCPCS

## 2020-11-23 RX ORDER — AZITHROMYCIN 250 MG/1
TABLET, FILM COATED ORAL
Qty: 6 TABLET | Refills: 0 | Status: SHIPPED | OUTPATIENT
Start: 2020-11-23 | End: 2020-12-03

## 2020-11-23 ASSESSMENT — ENCOUNTER SYMPTOMS
NAUSEA: 0
SORE THROAT: 1
COUGH: 1
CHEST TIGHTNESS: 0
RHINORRHEA: 1
SHORTNESS OF BREATH: 0
ABDOMINAL PAIN: 0

## 2020-11-23 NOTE — PROGRESS NOTES
alert and oriented to person, place, and time. Psychiatric:         Mood and Affect: Mood normal.         Behavior: Behavior normal.         Assessment:       Diagnosis Orders   1. Exposure to COVID-19 virus  Covid-19 Ambulatory   2. Generalized body aches  azithromycin (ZITHROMAX Z-COLLEEN) 250 MG tablet    Covid-19 Ambulatory   3. Cough  azithromycin (ZITHROMAX Z-COLLEEN) 250 MG tablet    Covid-19 Ambulatory   4. Acute nonintractable headache, unspecified headache type  Covid-19 Ambulatory       Plan:   COVID-19 test  ZPAK for burning chest symptoms possible atypical presentation  Symptomatic Care  Increase fluids and rest  RTO if symptoms worsen or stay the same        Due to this being a TeleHealth encounter (During [facility]-04 public health emergency), evaluation of the following organ systems was limited: Vitals/Constitutional/EENT/Resp/CV/GI//MS/Neuro/Skin/Heme-Lymph-Imm. Pursuant to the emergency declaration under the [address] [place] [address] authority and the [employer] and [employer] Act, this Virtual Visit was conducted with patient's (and/or legal guardian's) consent, to reduce the patient's risk of exposure to COVID-19 and provide necessary medical care. The patient (and/or legal guardian) has also been advised to contact this office for worsening conditions or problems, and seek emergency medical treatment and/or call 911 if deemed necessary.       [unfilled]

## 2020-11-25 LAB — SARS-COV-2: DETECTED

## 2020-12-16 ENCOUNTER — OFFICE VISIT (OUTPATIENT)
Dept: BARIATRICS/WEIGHT MGMT | Age: 45
End: 2020-12-16
Payer: COMMERCIAL

## 2020-12-16 ENCOUNTER — OFFICE VISIT (OUTPATIENT)
Dept: BARIATRICS/WEIGHT MGMT | Age: 45
End: 2020-12-16

## 2020-12-16 VITALS
TEMPERATURE: 96.6 F | HEART RATE: 76 BPM | DIASTOLIC BLOOD PRESSURE: 90 MMHG | WEIGHT: 293 LBS | SYSTOLIC BLOOD PRESSURE: 128 MMHG | BODY MASS INDEX: 51.52 KG/M2

## 2020-12-16 PROCEDURE — 99214 OFFICE O/P EST MOD 30 MIN: CPT | Performed by: PHYSICIAN ASSISTANT

## 2020-12-16 NOTE — PATIENT INSTRUCTIONS
· Behavior modification discussed in detail in regards to dietary habits. · Nutritional education occurred during visit. Continue following recommendations per dietitian. · Improvement in fitness/exercise discussed with patient and the need for this with/without surgery. · Schedule orientation with Lindaann Lundborg, Exercise Physiologist, at the fitness center. · Evaluation and treatment for AVERY - apt 2/1/20  · Cardiac Clearance needed prior to surgery prn  · Pulmonary Clearance needed prior to surgery apt 2/1/20  · Psychology evaluation with Dr. Grabiel Yu 2/1/21 and 2/26/21  · Physical Therapy referral prn  · EGD prior to any surgical intervention- Stephy to send referral  · Encouraged to attend support groups. 2020 list given. · Goal to lose 5% TBW prior to surgery. Current down 7#  · Seca scale next month  · Initial labs completed and reviewed. · EKG to be completed prior to next apt  · Advised not to get pregnant for 18-24 months post bariatric surgery as this can increase risk of malnourishment potentially leading to low birth weight or malformation. (had hysterectomy)  · Will need to be off work for 3-4 weeks post-bariatric surgery. · No lifting/pushing/pulling over 20# for 4 weeks post-op  · No NSAIDS 10 days prior to bariatric surgery.  Avoid NSAID use post-op  · Discussed Lovenox post-op bariatric surgery  · Awaiting LOMN

## 2020-12-16 NOTE — PATIENT INSTRUCTIONS
Goals: 1. Nutrition Goal:  I will continue to use my food journal to record meal times, serving sizes and bring back to next dietitian visit  2. Water Goal:  I will make sure drinking at least 64 oz of water a day or greater- great job with this! 3. Exercise Goal:  I will walk outside with  for ~ 30-40 minutes at least once a week  4. Take a look at You Tube Videos online that were given to you for Support Groups.

## 2020-12-16 NOTE — PROGRESS NOTES
[facility]  [address]  [place], [address] [facility]  [phone]      Visit Date:  12/16/2020  Weight Management Pre-Op Follow-up    HPI:    Medically Supervised follow-up- Month #1 of 3- desires sleeve    Radhika Lua is here today for continued supervised weight management of morbid obesity. Weight today 312#. BMI 51. Down 7# since last appointment. She reports that she is working on the behavior changes discussed at her initial appointment. Has made several changes based on dietitian recommendations. Has been doing better with nutrition. Making better choices overall. Eating 3 meals daily. Tracking all food intake. Cooks most meals at home. Rarely eats out. Packs lunch to work most days. Stopped drinking all pop. Drinking>100oz of water daily. Also drinks sugar free/caffeine free tea. No coffee. No alcohol. No smoking. Has a good support system through . Mother had RYGB and has done well. Long discussion on importance of lifestyle changes, making better decisions with nutrition and increasing dedicated activity to be successful lifelong with weight loss with or without bariatric surgery. Comorbid conditions include diabetes mellitus, hyperlipidemia, hypertension and  Depression. Awaiting LOMN. Has not yet attended support groups. Discussed pre-op EGD-will send referral. Pulmonary consult scheduled for 2/1/20. To complete EKG prior to next apt. Initial labs completed and reviewed. A1C 6.4- must remain < 8 prior to surgery. If she does not lose enough weight with medical management she would like to proceed with sleeve gastrectomy. Physical Activity:  walking  Days per week:2  Time per session: 30 minutes    Current BMI: Body mass index is 51.52 kg/m².   Current Weight:   Wt Readings from Last 3 Encounters:   12/16/20 (!) 312 lb (141.5 kg)   11/19/20 (!) 320 lb 6.4 oz (145.3 kg)   11/11/20 (!) 319 lb (144.7 kg)     Initial Body Weight:319 Past Medical History:  Past Medical History:   Diagnosis Date    Back pain     Depression     Diabetes mellitus (HCC)     GERD (gastroesophageal reflux disease)     Hypercholesteremia     Hypertension     PONV (postoperative nausea and vomiting)     UTI (urinary tract infection)        Past Surgical History:  Past Surgical History:   Procedure Laterality Date    CHOLECYSTECTOMY      ENDOMETRIAL ABLATION      HYSTERECTOMY ABDOMINAL N/A 12/12/2017    ROBOTIC TOTAL HYSTERECTOMY WITH BILATERAL SALPINGECTOMY performed by Chaya Martinez MD at [place] [id number]      vocal cord mass removed    OVARIAN CYST REMOVAL         Past Social History:  Social History     Socioeconomic History    Marital status:      Spouse name: Not on file    Number of children: Not on file    Years of education: Not on file    Highest education level: Not on file   Occupational History    Not on file   Social Needs    Financial resource strain: Not on file    Food insecurity     Worry: Not on file     Inability: Not on file    Transportation needs     Medical: Not on file     Non-medical: Not on file   Tobacco Use    Smoking status: Never Smoker    Smokeless tobacco: Never Used   Substance and Sexual Activity    Alcohol use: No    Drug use: No    Sexual activity: Not on file   Lifestyle    Physical activity     Days per week: Not on file     Minutes per session: Not on file    Stress: Not on file   Relationships    Social connections     Talks on phone: Not on file     Gets together: Not on file     Attends Christian service: Not on file     Active member of club or organization: Not on file     Attends meetings of clubs or organizations: Not on file     Relationship status: Not on file    Intimate partner violence     Fear of current or ex partner: Not on file     Emotionally abused: Not on file     Physically abused: Not on file     Forced sexual activity: Not on file Other Topics Concern    Not on file   Social History Narrative    Not on file        Medications:   Current Outpatient Medications   Medication Sig Dispense Refill    vitamin D 25 MCG (1000 UT) CAPS Take by mouth      dapagliflozin (FARXIGA) 5 MG tablet Take 1 tablet by mouth every morning 90 tablet 1    atorvastatin (LIPITOR) 20 MG tablet Take 1 tablet by mouth daily 90 tablet 1    Multiple Vitamins-Minerals (MULTIVITAMIN ADULT PO) Take by mouth      lisinopril-hydroCHLOROthiazide (PRINZIDE;ZESTORETIC) 20-12.5 MG per tablet Take 1 tablet by mouth daily 90 tablet 3    FLUoxetine (PROZAC) 20 MG capsule Take 60 mg by mouth daily        No current facility-administered medications for this visit. Allergies:   No Known Allergies    Subjective:    Review of Systems:  Constitutional: Denies any fever, chills, (+) fatigue. Wound: Denies any rash, skin color changes or wound problems. Resp: Denies any cough, shortness of breath. CV: Denies any chest pain, orthopnea or syncope. MS: Denies myalgias, (+)arthralgias-back  GI: Denies any nausea, vomiting,(+) diarrhea, constipation, melena, hematochezia. : Denies any hematuria, hesitancy or dysuria. NEURO: Denies seizures, headache. Objective:    BP (!) 128/90 (Site: Left Upper Arm, Position: Sitting, Cuff Size: Large Adult)   Pulse 76   Temp 96.6 °F (35.9 °C) (Infrared)   Wt (!) 312 lb (141.5 kg)   BMI 51.52 kg/m²   Constitutional: Alert and oriented to person, place and time. Well-developed, well- nourished. Head: Normocephalic and atraumatic  Neck: Supple. Eyes: EOMI b/l. Conjunctivae normal.  No scleral icterus. Respiratory: Effort normal. No respiratory distress. Abdomen: Obese  Ext:  Movement x 4. No edema  Skin; Warm and dry, no visible rashes, lesions or ulcers.    Neuro: Cranial Nerves Grossly Intact; nml coordination    CBC   Lab Results   Component Value Date    WBC 8.4 11/14/2020    RBC 4.54 11/14/2020    HGB 13.9 11/14/2020 HCT 41.1 11/14/2020    MCV 90.5 11/14/2020    MCH 30.6 11/14/2020    MCHC 33.8 11/14/2020    RDW 13.1 12/30/2017     11/14/2020    MPV 11.4 11/14/2020    RBCMORP NORMAL 08/11/2017    SEGSPCT 61.2 11/14/2020    LABLYMP 31.6 11/14/2020    MONOPCT 5.0 11/14/2020    LABEOS 1.3 11/14/2020    BASO 0.4 11/14/2020    NRBC 0 11/14/2020    SEGSABS 5.1 11/14/2020    LYMPHSABS 2.7 11/14/2020    MONOSABS 0.4 11/14/2020    EOSABS 0.1 11/14/2020    BASOSABS 0.0 11/14/2020        BMP/CMP   Lab Results   Component Value Date    GLUCOSE 139 11/14/2020    CREATININE 0.6 11/14/2020    BUN 16 11/14/2020     11/14/2020    K 4.2 11/14/2020     11/14/2020    CO2 24 11/14/2020    CALCIUM 9.2 11/14/2020    AST 27 11/14/2020    ALKPHOS 76 11/14/2020    PROT 7.1 11/14/2020    LABALBU 4.3 11/14/2020    BILITOT 0.6 11/14/2020    ALT 49 11/14/2020        PREALBUMIN   Lab Results   Component Value Date    PREALBUMIN 26.9 11/14/2020        VITAMIN B12   Lab Results   Component Value Date    UUDXFXWG86 415 11/14/2020        VITAMIN D   Lab Results   Component Value Date    VITD25 35 11/14/2020        PTH  Lab Results   Component Value Date    IPTH 53.6 11/14/2020       VITAMIN B1/ THIAMINE   Lab Results   Component Value Date    OPKM5JFHAZI 144 11/14/2020        LIPID SCREEN (FASTING)   Lab Results   Component Value Date    CHOL 240 11/14/2020    TRIG 274 11/14/2020    HDL 42 11/14/2020    [address] 143 11/14/2020   ,     HGA1C (T2DM ONLY)   Lab Results   Component Value Date    LABA1C 6.4 11/14/2020    AVGG 132 11/14/2020        TSH   Lab Results   Component Value Date    TSH 2.570 11/14/2020        IRON   Lab Results   Component Value Date    IRON 95 11/14/2020        TIBC  Lab Results   Component Value Date    TIBC 304 11/14/2020       FERRITIN  Lab Results   Component Value Date    FERRITIN 111 11/14/2020       VITAMIN A  Lab Results   Component Value Date    RETINOL SEE BELOW 11/14/2020       NICOTINE No results found for: NMET    UDS  Lab Results   Component Value Date    UDP SEE BELOW 11/14/2020       PSA  No results found for: LABPSA    GFR  Lab Results   Component Value Date    LABGLOM >90 11/14/2020       DEXA  No results found for this or any previous visit. Assessment:       Diagnosis Orders   1. BMI 50.0-59.9, adult ([facility] [address]     2. Essential hypertension     3. Diabetes mellitus type II, non insulin dependent ([facility] [address]     4. Hyperlipidemia, unspecified hyperlipidemia type     5. At risk for sleep apnea     6. Depression, unspecified depression type         Plan:    · Behavior modification discussed in detail in regards to dietary habits. · Nutritional education occurred during visit. Continue following recommendations  per dietitian. · Improvement in fitness/exercise discussed with patient and the need for this  with/without surgery. · Schedule orientation with Araceli Sánchez, Exercise Physiologist, at the fitness  center. · Evaluation and treatment for AVERY - apt 2/1/20  · Cardiac Clearance needed prior to surgery prn  · Pulmonary Clearance needed prior to surgery apt 2/1/20  · Psychology evaluation with Dr. Rojas Gloss 2/1/21 and 2/26/21  · Physical Therapy referral prn  · EGD prior to any surgical intervention- Stephy to send referral  · Encouraged to attend support groups. 2020 list given. · Goal to lose 5% TBW prior to surgery. Current down 7#  · Seca scale next month  · Initial labs completed and reviewed. · EKG to be completed prior to next apt  · Advised not to get pregnant for 18-24 months post bariatric surgery as this can increase risk of malnourishment potentially leading to low birth weight or malformation. (had hysterectomy)  · Will need to be off work for 3-4 weeks post-bariatric surgery. · No lifting/pushing/pulling over 20# for 4 weeks post-op  · No NSAIDS 10 days prior to bariatric surgery.  Avoid NSAID use post-op  · Discussed Lovenox post-op bariatric surgery  · Awaiting LOMN Return in about 1 month (around 1/16/2021) for Follow up. I spent over 30 minutes with the patient, with greater that 50% of that time spent on education, counseling and coordination of care.      Electronically signed by NADEEM Olivares on 12/16/2020 at 3:23 PM

## 2021-01-19 ENCOUNTER — HOSPITAL ENCOUNTER (OUTPATIENT)
Age: 46
Discharge: HOME | End: 2021-01-19
Payer: COMMERCIAL

## 2021-01-19 ENCOUNTER — OFFICE VISIT (OUTPATIENT)
Dept: BARIATRICS/WEIGHT MGMT | Age: 46
End: 2021-01-19
Payer: COMMERCIAL

## 2021-01-19 VITALS
WEIGHT: 293 LBS | HEART RATE: 66 BPM | TEMPERATURE: 97 F | BODY MASS INDEX: 48.82 KG/M2 | HEIGHT: 65 IN | DIASTOLIC BLOOD PRESSURE: 82 MMHG | SYSTOLIC BLOOD PRESSURE: 124 MMHG | RESPIRATION RATE: 18 BRPM

## 2021-01-19 DIAGNOSIS — E11.9 DIABETES MELLITUS TYPE II, NON INSULIN DEPENDENT (HCC): ICD-10-CM

## 2021-01-19 DIAGNOSIS — Z13.21 MALNUTRITION SCREEN: ICD-10-CM

## 2021-01-19 DIAGNOSIS — Z01.818 PRE-OP TESTING: ICD-10-CM

## 2021-01-19 DIAGNOSIS — E66.01 MORBID OBESITY WITH BMI OF 50.0-59.9, ADULT (HCC): ICD-10-CM

## 2021-01-19 DIAGNOSIS — F32.A DEPRESSION, UNSPECIFIED DEPRESSION TYPE: ICD-10-CM

## 2021-01-19 DIAGNOSIS — E78.5 HYPERLIPIDEMIA, UNSPECIFIED HYPERLIPIDEMIA TYPE: ICD-10-CM

## 2021-01-19 DIAGNOSIS — I10 ESSENTIAL HYPERTENSION: ICD-10-CM

## 2021-01-19 DIAGNOSIS — Z91.89 AT RISK FOR SLEEP APNEA: ICD-10-CM

## 2021-01-19 LAB
EKG ATRIAL RATE: 79 BPM
EKG P AXIS: 62 DEGREES
EKG P-R INTERVAL: 174 MS
EKG Q-T INTERVAL: 406 MS
EKG QRS DURATION: 86 MS
EKG QTC CALCULATION (BAZETT): 465 MS
EKG R AXIS: 38 DEGREES
EKG T AXIS: 47 DEGREES
EKG VENTRICULAR RATE: 79 BPM

## 2021-01-19 PROCEDURE — 99213 OFFICE O/P EST LOW 20 MIN: CPT | Performed by: PHYSICIAN ASSISTANT

## 2021-01-19 PROCEDURE — 93005 ELECTROCARDIOGRAM TRACING: CPT

## 2021-01-19 NOTE — PROGRESS NOTES
[facility]  [address]  [address] [facility], [address] [place] [address]  [phone]      Visit Date:  1/19/2021  Weight Management Pre-Op Follow-up    HPI:    Medically Supervised follow-up- Month #2 of 3- desires mart Roger is here today for continued supervised weight management of morbid obesity. Weight today 309#. Down 3# since last month. Down 10# since starting with weight management. BMI 51. Doing well with nutrition overall. Tracking food intake. Making good choices most of the time. Continues to work on better portion control. Avoiding sweets. Drinking 100oz of water daily. Also drinking sugar free tea. No pop. No juice. Has been walking 3 nights per week for 30 minutes. Tracking steps and typically 7-8k steps daily. Comorbid conditions include diabetes mellitus, hyperlipidemia, hypertension and depression. Monitoring sugars and typically running . Recent A1C 6.4. Awaiting LOMN. Completed support groups x 1. EKG completed and reviewed. EGD scheduled for February 9th at [facility]. Pulmonary consult scheduled for 2/1/21. Psychological clearance with Dr. Snow Robertson scheduled for 2/1/21 and 2/26/21. Plans to call reschedule 2/1/21 apts as she is planning to go to [place]. If she does not lose enough weight with medical management she would like to proceed with sleeve gastrectomy. Current BMI: Body mass index is 51.09 kg/m².   Current Weight:   Wt Readings from Last 3 Encounters:   01/19/21 (!) 309 lb 6.4 oz (140.3 kg)   12/16/20 (!) 312 lb (141.5 kg)   11/19/20 (!) 320 lb 6.4 oz (145.3 kg)     Initial Body Weight:319    Past Medical History:  Past Medical History:   Diagnosis Date    Back pain     Depression     Diabetes mellitus (HCC)     GERD (gastroesophageal reflux disease)     Hypercholesteremia     Hypertension     PONV (postoperative nausea and vomiting)     UTI (urinary tract infection)        Past Surgical History:  Past Surgical History: Procedure Laterality Date    CHOLECYSTECTOMY      ENDOMETRIAL ABLATION      HYSTERECTOMY ABDOMINAL N/A 12/12/2017    ROBOTIC TOTAL HYSTERECTOMY WITH BILATERAL SALPINGECTOMY performed by Neema Jiménez MD at [address] [facility]      vocal cord mass removed    OVARIAN CYST REMOVAL         Past Social History:  Social History     Socioeconomic History    Marital status:      Spouse name: Not on file    Number of children: Not on file    Years of education: Not on file    Highest education level: Not on file   Occupational History    Not on file   Social Needs    Financial resource strain: Not on file    Food insecurity     Worry: Not on file     Inability: Not on file    Transportation needs     Medical: Not on file     Non-medical: Not on file   Tobacco Use    Smoking status: Never Smoker    Smokeless tobacco: Never Used   Substance and Sexual Activity    Alcohol use: No    Drug use: No    Sexual activity: Not on file   Lifestyle    Physical activity     Days per week: Not on file     Minutes per session: Not on file    Stress: Not on file   Relationships    Social connections     Talks on phone: Not on file     Gets together: Not on file     Attends Jainism service: Not on file     Active member of club or organization: Not on file     Attends meetings of clubs or organizations: Not on file     Relationship status: Not on file    Intimate partner violence     Fear of current or ex partner: Not on file     Emotionally abused: Not on file     Physically abused: Not on file     Forced sexual activity: Not on file   Other Topics Concern    Not on file   Social History Narrative    Not on file        Medications:   Current Outpatient Medications   Medication Sig Dispense Refill    vitamin D 25 MCG (1000 UT) CAPS Take by mouth      dapagliflozin (FARXIGA) 5 MG tablet Take 1 tablet by mouth every morning 90 tablet 1  atorvastatin (LIPITOR) 20 MG tablet Take 1 tablet by mouth daily 90 tablet 1    Multiple Vitamins-Minerals (MULTIVITAMIN ADULT PO) Take by mouth      lisinopril-hydroCHLOROthiazide (PRINZIDE;ZESTORETIC) 20-12.5 MG per tablet Take 1 tablet by mouth daily 90 tablet 3    FLUoxetine (PROZAC) 20 MG capsule Take 60 mg by mouth daily        No current facility-administered medications for this visit. Allergies:   No Known Allergies    Subjective:    Review of Systems:  Constitutional: Denies any fever, chills, (+) fatigue. Wound: Denies any rash, skin color changes or wound problems. Resp: Denies any cough, shortness of breath. CV: Denies any chest pain, orthopnea or syncope. MS: Denies myalgias, (+)arthralgias-back  GI: Denies any nausea, vomiting,(+) diarrhea, constipation, melena, hematochezia. : Denies any hematuria, hesitancy or dysuria. NEURO: Denies seizures, headache. Objective:    /82 (Site: Left Upper Arm, Position: Sitting, Cuff Size: Large Adult)   Pulse 66   Temp 97 °F (36.1 °C) (Infrared)   Resp 18   Ht 5' 5.25\" (1.657 m)   Wt (!) 309 lb 6.4 oz (140.3 kg)   BMI 51.09 kg/m²   Physical Examination:   Constitutional: Alert and oriented to person, place and time. Well-developed, well- nourished. Head: Normocephalic and atraumatic  Neck: Supple. Eyes: EOMI b/l. Conjunctivae normal.  No scleral icterus. Respiratory: Effort normal. No respiratory distress. Abd: Benign  Ext:  Movement x 4. No edema  Skin; Warm and dry, no visible rashes, lesions or ulcers.    Neuro: Cranial Nerves Grossly Intact; nml coordination    CBC   Lab Results   Component Value Date    WBC 8.4 11/14/2020    RBC 4.54 11/14/2020    HGB 13.9 11/14/2020    HCT 41.1 11/14/2020    MCV 90.5 11/14/2020    MCH 30.6 11/14/2020    MCHC 33.8 11/14/2020    RDW 13.1 12/30/2017     11/14/2020    MPV 11.4 11/14/2020    RBCMORP NORMAL 08/11/2017    SEGSPCT 61.2 11/14/2020    LABLYMP 31.6 11/14/2020 Assessment:       Diagnosis Orders   1. BMI 50.0-59.9, adult ([facility]     2. Essential hypertension     3. Diabetes mellitus type II, non insulin dependent ([facility]     4. Hyperlipidemia, unspecified hyperlipidemia type     5. At risk for sleep apnea     6. Depression, unspecified depression type         Plan:    · Behavior modification discussed in detail in regards to dietary habits. · Nutritional education occurred during visit. Continue following recommendations  per dietitian. · Improvement in fitness/exercise discussed with patient and the need for this  with/without surgery. · Schedule orientation with Lashonda Aldrich, Exercise Physiologist, at the fitness  center. · Evaluation and treatment for AVERY - apt 2/1/20  · Cardiac Clearance needed prior to surgery prn  · Pulmonary Clearance needed prior to surgery apt 2/1/20  · Psychology evaluation with Dr. Snow Robertson 2/1/21 and 2/26/21  · EGD prior to any surgical intervention- 2/9/21  · Encouraged to attend support groups. Has attended x1.   · Goal to lose 5% TBW prior to surgery. Current down 10#  · Seca scale to be completed next month  · EKG completed and reviewed. (preliminary read)  · Advised not to get pregnant for 18-24 months post bariatric surgery as this can increase risk of malnourishment potentially leading to low birth weight or malformation. (had hysterectomy)  · Will need to be off work for 3-4 weeks post-bariatric surgery. · No lifting/pushing/pulling over 20# for 4 weeks post-op  · No NSAIDS 10 days prior to bariatric surgery. Avoid NSAID use post-op  · Discussed Lovenox post-op bariatric surgery  · Awaiting LOMN- another form given today  Return in about 1 month (around 2/19/2021) for Follow up. I spent over 20 minutes with the patient, with greater that 50% of that time spent on education, counseling and coordination of care.      Electronically signed by NADEEM Riley on 1/19/2021 at 3:17 PM

## 2021-02-05 ENCOUNTER — PATIENT MESSAGE (OUTPATIENT)
Dept: FAMILY MEDICINE CLINIC | Age: 46
End: 2021-02-05

## 2021-02-08 RX ORDER — FLUOXETINE HYDROCHLORIDE 20 MG/1
60 CAPSULE ORAL DAILY
Qty: 360 CAPSULE | Refills: 3 | Status: SHIPPED | OUTPATIENT
Start: 2021-02-08 | End: 2022-04-19 | Stop reason: SDUPTHER

## 2021-02-08 NOTE — TELEPHONE ENCOUNTER
From: Francisco Roger  To: THADDEUS Lancaster - NINA  Sent: 2/5/2021 11:26 PM EST  Subject: Prescription Question    Hi! I will be needing a new Rx written for my Prozac. Can this be done by you?

## 2021-02-09 ENCOUNTER — HOSPITAL ENCOUNTER (OUTPATIENT)
Age: 46
Setting detail: OUTPATIENT SURGERY
Discharge: HOME | End: 2021-02-09
Attending: INTERNAL MEDICINE | Admitting: INTERNAL MEDICINE
Payer: COMMERCIAL

## 2021-02-09 ENCOUNTER — ANESTHESIA (OUTPATIENT)
Dept: ENDOSCOPY | Age: 46
End: 2021-02-09
Payer: COMMERCIAL

## 2021-02-09 ENCOUNTER — ANESTHESIA EVENT (OUTPATIENT)
Dept: ENDOSCOPY | Age: 46
End: 2021-02-09
Payer: COMMERCIAL

## 2021-02-09 ENCOUNTER — INITIAL CONSULT (OUTPATIENT)
Dept: PULMONOLOGY | Age: 46
End: 2021-02-09
Payer: COMMERCIAL

## 2021-02-09 VITALS
TEMPERATURE: 96.4 F | DIASTOLIC BLOOD PRESSURE: 72 MMHG | OXYGEN SATURATION: 98 % | SYSTOLIC BLOOD PRESSURE: 126 MMHG | HEIGHT: 65 IN | BODY MASS INDEX: 48.82 KG/M2 | HEART RATE: 86 BPM | WEIGHT: 293 LBS

## 2021-02-09 VITALS
DIASTOLIC BLOOD PRESSURE: 60 MMHG | RESPIRATION RATE: 16 BRPM | OXYGEN SATURATION: 99 % | TEMPERATURE: 97.3 F | SYSTOLIC BLOOD PRESSURE: 138 MMHG

## 2021-02-09 VITALS
HEART RATE: 78 BPM | DIASTOLIC BLOOD PRESSURE: 50 MMHG | BODY MASS INDEX: 47.09 KG/M2 | WEIGHT: 293 LBS | SYSTOLIC BLOOD PRESSURE: 93 MMHG | HEIGHT: 66 IN | TEMPERATURE: 96.8 F | OXYGEN SATURATION: 96 % | RESPIRATION RATE: 16 BRPM

## 2021-02-09 DIAGNOSIS — R06.83 SNORING: ICD-10-CM

## 2021-02-09 DIAGNOSIS — Z91.89 AT RISK FOR OBSTRUCTIVE SLEEP APNEA: Primary | ICD-10-CM

## 2021-02-09 DIAGNOSIS — E66.01 MORBID OBESITY WITH BMI OF 50.0-59.9, ADULT (HCC): ICD-10-CM

## 2021-02-09 LAB — GLUCOSE BLD-MCNC: 127 MG/DL (ref 70–108)

## 2021-02-09 PROCEDURE — 3609012400 HC EGD TRANSORAL BIOPSY SINGLE/MULTIPLE: Performed by: INTERNAL MEDICINE

## 2021-02-09 PROCEDURE — 2500000003 HC RX 250 WO HCPCS: Performed by: NURSE ANESTHETIST, CERTIFIED REGISTERED

## 2021-02-09 PROCEDURE — 6360000002 HC RX W HCPCS: Performed by: NURSE ANESTHETIST, CERTIFIED REGISTERED

## 2021-02-09 PROCEDURE — 2580000003 HC RX 258: Performed by: INTERNAL MEDICINE

## 2021-02-09 PROCEDURE — 3700000000 HC ANESTHESIA ATTENDED CARE: Performed by: INTERNAL MEDICINE

## 2021-02-09 PROCEDURE — 2709999900 HC NON-CHARGEABLE SUPPLY: Performed by: INTERNAL MEDICINE

## 2021-02-09 PROCEDURE — 88305 TISSUE EXAM BY PATHOLOGIST: CPT

## 2021-02-09 PROCEDURE — 7100000011 HC PHASE II RECOVERY - ADDTL 15 MIN: Performed by: INTERNAL MEDICINE

## 2021-02-09 PROCEDURE — 7100000010 HC PHASE II RECOVERY - FIRST 15 MIN: Performed by: INTERNAL MEDICINE

## 2021-02-09 PROCEDURE — 99203 OFFICE O/P NEW LOW 30 MIN: CPT | Performed by: INTERNAL MEDICINE

## 2021-02-09 PROCEDURE — 3700000001 HC ADD 15 MINUTES (ANESTHESIA): Performed by: INTERNAL MEDICINE

## 2021-02-09 PROCEDURE — 82948 REAGENT STRIP/BLOOD GLUCOSE: CPT

## 2021-02-09 RX ORDER — SODIUM CHLORIDE 450 MG/100ML
INJECTION, SOLUTION INTRAVENOUS CONTINUOUS
Status: DISCONTINUED | OUTPATIENT
Start: 2021-02-09 | End: 2021-02-09 | Stop reason: HOSPADM

## 2021-02-09 RX ORDER — SODIUM CHLORIDE 0.9 % (FLUSH) 0.9 %
10 SYRINGE (ML) INJECTION EVERY 12 HOURS SCHEDULED
Status: DISCONTINUED | OUTPATIENT
Start: 2021-02-09 | End: 2021-02-09 | Stop reason: HOSPADM

## 2021-02-09 RX ORDER — SODIUM CHLORIDE 0.9 % (FLUSH) 0.9 %
10 SYRINGE (ML) INJECTION PRN
Status: DISCONTINUED | OUTPATIENT
Start: 2021-02-09 | End: 2021-02-09 | Stop reason: HOSPADM

## 2021-02-09 RX ORDER — PROPOFOL 10 MG/ML
INJECTION, EMULSION INTRAVENOUS PRN
Status: DISCONTINUED | OUTPATIENT
Start: 2021-02-09 | End: 2021-02-09 | Stop reason: SDUPTHER

## 2021-02-09 RX ORDER — LIDOCAINE HYDROCHLORIDE 20 MG/ML
INJECTION, SOLUTION EPIDURAL; INFILTRATION; INTRACAUDAL; PERINEURAL PRN
Status: DISCONTINUED | OUTPATIENT
Start: 2021-02-09 | End: 2021-02-09 | Stop reason: SDUPTHER

## 2021-02-09 RX ADMIN — LIDOCAINE HYDROCHLORIDE 80 MG: 20 INJECTION, SOLUTION EPIDURAL; INFILTRATION; INTRACAUDAL; PERINEURAL at 13:18

## 2021-02-09 RX ADMIN — SODIUM CHLORIDE: 4.5 INJECTION, SOLUTION INTRAVENOUS at 13:09

## 2021-02-09 RX ADMIN — PROPOFOL 60 MG: 10 INJECTION, EMULSION INTRAVENOUS at 13:24

## 2021-02-09 RX ADMIN — PROPOFOL 100 MG: 10 INJECTION, EMULSION INTRAVENOUS at 13:25

## 2021-02-09 RX ADMIN — SODIUM CHLORIDE: 4.5 INJECTION, SOLUTION INTRAVENOUS at 12:29

## 2021-02-09 RX ADMIN — PROPOFOL 100 MG: 10 INJECTION, EMULSION INTRAVENOUS at 13:20

## 2021-02-09 ASSESSMENT — PAIN SCALES - GENERAL: PAINLEVEL_OUTOF10: 0

## 2021-02-09 NOTE — H&P
dapagliflozin (FARXIGA) 5 MG tablet Take 1 tablet by mouth every morning 11/18/20  Yes THADDEUS Greene CNP   atorvastatin (LIPITOR) 20 MG tablet Take 1 tablet by mouth daily 11/18/20  Yes THADDEUS Greene CNP   Multiple Vitamins-Minerals (MULTIVITAMIN ADULT PO) Take by mouth   Yes Historical Provider, MD   lisinopril-hydroCHLOROthiazide (PRINZIDE;ZESTORETIC) 20-12.5 MG per tablet Take 1 tablet by mouth daily 11/11/20  Yes THADDEUS Greene CNP     Additional information:       PHYSICAL:   Heart:  [x]Regular rate and rhythm  []Other:    Lungs:  [x]Clear    []Other:    Abdomen: [x]Soft    []Other:    Mental Status: [x]Alert & Oriented  []Other:      VITAL SIGNS   Patient Vitals for the past 24 hrs:   BP Temp Temp src Pulse Resp SpO2 Height Weight   02/09/21 1201 (!) 142/96 97.3 °F (36.3 °C) Tympanic 93 16 97 % 5' 6\" (1.676 m) (!) 313 lb (142 kg)       PLANNED PROCEDURE  [x]EGD  []Colonoscopy []Flex Sigmoid  []ERCP []EUS   []Cystoscopy  [] CATH [] BRONCH   Consent: I have discussed with the patient and/or the patient representative the indication, alternatives, and the possible risks and/or complications of the planned procedure and the anesthesia methods. The patient and/or patient representative appear to understand and agree to proceed.   SEDATION  Planned agent:[]Midazolam []Meperidine []Sublimaze []Morphine  []Diazepam [x]Propofol  []Other:     ASA Classification: Class 3 - A patient with severe systemic disease that limits activity but is not incapacitating    Airway Assessment: Mallampati Class I - (soft palate, fauces, uvula & anterior/posterior tonsillar pillars are visible)

## 2021-02-09 NOTE — PROGRESS NOTES
Patient admitted to Endo room 2. Consent signed and on the chart. Blood glucose at bedside, 127.  in room.

## 2021-02-09 NOTE — PROGRESS NOTES
EGD complete, photos taken, 2 specimens taken and sent into 2 separate jars, pt tolerated procedure well    Scope Number  used.

## 2021-02-09 NOTE — ANESTHESIA PRE PROCEDURE
Department of Anesthesiology  Preprocedure Note       Name:  Francisco Roger   Age:  39 y.o.  :  1975                                          MRN:  [id number]         Date:  2021      Surgeon: Matthew Dupree):  Sheila De Leon MD    Procedure: Procedure(s):  EGD ESOPHAGOGASTRODUODENOSCOPY    Medications prior to admission:   Prior to Admission medications    Medication Sig Start Date End Date Taking?  Authorizing Provider   FLUoxetine (PROZAC) 20 MG capsule Take 3 capsules by mouth daily 21  Yes THADDEUS Lancaster CNP   vitamin D 25 MCG (1000 UT) CAPS Take by mouth   Yes Historical Provider, MD   dapagliflozin (FARXIGA) 5 MG tablet Take 1 tablet by mouth every morning 20  Yes THADDEUS Lancaster CNP   atorvastatin (LIPITOR) 20 MG tablet Take 1 tablet by mouth daily 20  Yes THADDEUS Lancaster CNP   Multiple Vitamins-Minerals (MULTIVITAMIN ADULT PO) Take by mouth   Yes Historical Provider, MD   lisinopril-hydroCHLOROthiazide (PRINZIDE;ZESTORETIC) 20-12.5 MG per tablet Take 1 tablet by mouth daily 20  Yes THADDEUS Lancaster CNP       Current medications:    Current Facility-Administered Medications   Medication Dose Route Frequency Provider Last Rate Last Admin    sodium chloride flush 0.9 % injection 10 mL  10 mL Intravenous 2 times per day Sheila De Leon MD        sodium chloride flush 0.9 % injection 10 mL  10 mL Intravenous PRN Sheila De Leon MD        0.45 % sodium chloride infusion   Intravenous Continuous Sheila De Leon MD 75 mL/hr at 21 1229 New Bag at 21 1229       Allergies:  No Known Allergies    Problem List:    Patient Active Problem List   Diagnosis Code    Type 2 diabetes mellitus without complication, without long-term current use of insulin ([facility] E11.9    Essential hypertension I10    Morbid obesity with BMI of 50.0-59.9, adult ([facility] E66.01, Z68.43    Recurrent major depressive disorder, in full remission ([facility] F33.42 Past Medical History:        Diagnosis Date    Back pain     Depression     Diabetes mellitus (HCC)     GERD (gastroesophageal reflux disease)     Hypercholesteremia     Hypertension     PONV (postoperative nausea and vomiting)     UTI (urinary tract infection)        Past Surgical History:        Procedure Laterality Date    CHOLECYSTECTOMY      ENDOMETRIAL ABLATION      HYSTERECTOMY ABDOMINAL N/A 12/12/2017    ROBOTIC TOTAL HYSTERECTOMY WITH BILATERAL SALPINGECTOMY performed by Aishwarya Barber MD at [address] [place] [address]      vocal cord mass removed    OVARIAN CYST REMOVAL         Social History:    Social History     Tobacco Use    Smoking status: Never Smoker    Smokeless tobacco: Never Used   Substance Use Topics    Alcohol use: No                                Counseling given: Not Answered      Vital Signs (Current):   Vitals:    02/09/21 1201   BP: (!) 142/96   Pulse: 93   Resp: 16   Temp: 36.3 °C (97.3 °F)   TempSrc: Tympanic   SpO2: 97%   Weight: (!) 313 lb (142 kg)   Height: 5' 6\" (1.676 m)                                              BP Readings from Last 3 Encounters:   02/09/21 (!) 142/96   02/09/21 126/72   01/19/21 124/82       NPO Status: Time of last liquid consumption: 2200                        Time of last solid consumption: 2200                        Date of last liquid consumption: 02/08/21                        Date of last solid food consumption: 02/08/21    BMI:   Wt Readings from Last 3 Encounters:   02/09/21 (!) 313 lb (142 kg)   02/09/21 (!) 313 lb 9.6 oz (142.2 kg)   01/19/21 (!) 309 lb 6.4 oz (140.3 kg)     Body mass index is 50.52 kg/m².     CBC:   Lab Results   Component Value Date    WBC 8.4 11/14/2020    RBC 4.54 11/14/2020    HGB 13.9 11/14/2020    HCT 41.1 11/14/2020    MCV 90.5 11/14/2020    RDW 13.1 12/30/2017     11/14/2020       CMP:   Lab Results   Component Value Date     11/14/2020    K 4.2 11/14/2020     11/14/2020 CO2 24 11/14/2020    BUN 16 11/14/2020    CREATININE 0.6 11/14/2020    LABGLOM >90 11/14/2020    GLUCOSE 139 11/14/2020    PROT 7.1 11/14/2020    CALCIUM 9.2 11/14/2020    BILITOT 0.6 11/14/2020    ALKPHOS 76 11/14/2020    AST 27 11/14/2020    ALT 49 11/14/2020       POC Tests:   Recent Labs     02/09/21  1227   POCGLU 127*       Coags:   Lab Results   Component Value Date    INR 0.90 11/14/2020    APTT 30.6 11/14/2020       HCG (If Applicable):   Lab Results   Component Value Date    PREGSERUM NEGATIVE 06/20/2017        ABGs: No results found for: PHART, PO2ART, SOP7YDG, MOL9CIE, BEART, C4XTXDKE     Type & Screen (If Applicable):  Lab Results   Component Value Date    LABRH POS 12/12/2017       Drug/Infectious Status (If Applicable):  No results found for: HIV, HEPCAB    COVID-19 Screening (If Applicable):   Lab Results   Component Value Date    COVID19 Detected 11/23/2020         Anesthesia Evaluation    Airway: Mallampati: III        Dental:          Pulmonary:Negative Pulmonary ROS breath sounds clear to auscultation                             Cardiovascular:  Exercise tolerance: good (>4 METS),   (+) hypertension:,         Rhythm: regular  Rate: normal                    Neuro/Psych:   Negative Neuro/Psych ROS              GI/Hepatic/Renal:   (+) GERD:,           Endo/Other:    (+) Diabetes, . Abdominal:   (+) obese,     Abdomen: soft. Vascular: negative vascular ROS. Anesthesia Plan      MAC     ASA 2       Induction: intravenous. Anesthetic plan and risks discussed with patient and spouse. Plan discussed with CRNA.                   THADDEUS Ceballos - SHANNA   2/9/2021

## 2021-02-09 NOTE — POST SEDATION
[facility]  Sedation/Analgesia Post Sedation Record    Patient: Chandra Wray : 1975  Med Rec#: [id number] Acc#: [id number]   Procedure Performed By: Beryl Almeida  Primary Care Physician: THADDEUS Lewis CNP    POST-PROCEDURE    Physicians/Assistants: Beryl Almeida  Procedure Performed:    Sedation/Anesthesia:     Estimated Blood Loss:          ml  Specimens Removed:  []None [x]Other:      Disposition of Specimen:  [x]Pathology []Other      Complications:   [x]None Immediate []Other:     Post-procedure Diagnosis/Findings:             Recommendations:      artem Almeida MD [facility]  Electronically signed 2021 at 1:30 PM

## 2021-02-09 NOTE — PATIENT INSTRUCTIONS
Patient Education        Sleep Apnea: Care Instructions  Your Care Instructions     Sleep apnea means that you frequently stop breathing for 10 seconds or longer during sleep. It can be mild to severe, based on the number of times an hour that you stop breathing or have slowed breathing. Blocked or narrowed airways in your nose, mouth, or throat can cause sleep apnea. Your airway can become blocked when your throat muscles and tongue relax during sleep. You can treat sleep apnea at home by making lifestyle changes. You also can use a CPAP breathing machine that keeps tissues in the throat from blocking your airway. Or your doctor may suggest that you use a breathing device while you sleep. It helps keep your airway open. This could be a device that you put in your mouth. In some cases, surgery may be needed to remove enlarged tissues in the throat. Follow-up care is a key part of your treatment and safety. Be sure to make and go to all appointments, and call your doctor if you are having problems. It's also a good idea to know your test results and keep a list of the medicines you take. How can you care for yourself at home? · Lose weight, if needed. It may reduce the number of times you stop breathing or have slowed breathing. · Sleep on your side. It may stop mild apnea. If you tend to roll onto your back, sew a pocket in the back of your pajama top. Put a tennis ball into the pocket, and stitch the pocket shut. This will help keep you from sleeping on your back. · Avoid alcohol and medicines such as sleeping pills and sedatives before bed. · Do not smoke. Smoking can make sleep apnea worse. If you need help quitting, talk to your doctor about stop-smoking programs and medicines. These can increase your chances of quitting for good. · Prop up the head of your bed 4 to 6 inches by putting bricks under the legs of the bed. · Treat breathing problems, such as a stuffy nose, caused by a cold or allergies. · Try a continuous positive airway pressure (CPAP) breathing machine if your doctor recommends it. The machine keeps your airway open when you sleep. · If CPAP does not work for you, ask your doctor if you can try other breathing machines. A bilevel positive airway pressure machine uses one type of air pressure for breathing in and another type for breathing out. Another device raises or lowers air pressure as needed while you breathe. · Talk to your doctor if:  ? Your nose feels dry or bleeds when you use one of these machines. You may need to increase moisture in the air. A humidifier may help. ? Your nose is runny or stuffy from using a breathing machine. Decongestants or a corticosteroid nasal spray may help. ? You are sleepy during the day and it gets in the way of the normal things you do. Do not drive when you are drowsy. When should you call for help? Watch closely for changes in your health, and be sure to contact your doctor if:    · You still have sleep apnea even though you have made lifestyle changes.     · You are thinking of trying a device such as CPAP.     · You are having problems using a CPAP or similar machine. Where can you learn more? Go to https://[employer]. org and sign in to your [employer] account. Enter O277 in the [employer] box to learn more about \"Sleep Apnea: Care Instructions. \"     If you do not have an account, please click on the \"Sign Up Now\" link. Current as of: February 24, 2020               Content Version: 12.6  © 2006-2020 [employer]. Care instructions adapted under license by [employer] [facility] ([facility]). If you have questions about a medical condition or this instruction, always ask your healthcare professional. [place] [address] any warranty or liability for your use of this information. Patient Education        Learning About CPAP for Sleep Apnea  What is CPAP? CPAP is a small machine that you use at home every night while you sleep. It increases air pressure in your throat to keep your airway open. When you have sleep apnea, this can help you sleep better so you feel much better. CPAP stands for \"continuous positive airway pressure. \"  The CPAP machine will have one of the following:  · A mask that covers your nose and mouth  · Prongs that fit into your nose  · A mask that covers your nose only, which is the most common type. This type is called NCPAP. The N stands for \"nasal.\"  Why is it done? CPAP is usually the best treatment for obstructive sleep apnea. It is the first treatment choice and the most widely used. CPAP:  · Helps you have more normal sleep, so you feel less sleepy and more alert during the daytime. · May help keep heart failure or other heart problems from getting worse. · May help lower your blood pressure. If you use CPAP, your bed partner may also sleep better. That's because you aren't snoring or restless. Your doctor may suggest CPAP if you have:  · Moderate to severe sleep apnea. · Sleep apnea and coronary artery disease (CAD). · Sleep apnea and heart failure. What are the side effects? Some people who use CPAP have:  · A dry or stuffy nose and a sore throat. · Irritated skin on the face. · Sore eyes. · Bloating. How can you care for yourself? If using CPAP is not comfortable, or if you have certain side effects, work with your doctor to fix them. Here are some things you can try:  · Be sure the mask or nasal prongs fit well. · See if your doctor can adjust the pressure of your CPAP. · If your nose is dry, try a humidifier. · If your nose is runny or stuffy, try decongestant medicine or a steroid nasal spray. Be safe with medicines. Read and follow all instructions on the label. Do not use the medicine longer than the label says. · Sleep on your side. It may stop mild apnea. If you tend to roll onto your back, sew a pocket in the back of your pajama top. Put a tennis ball into the pocket, and stitch the pocket shut. This will help keep you from sleeping on your back. · Avoid alcohol and medicines such as sleeping pills and sedatives before bed. · Do not smoke. Smoking can make heart failure and sleep apnea worse. If you need help quitting, talk to your doctor about stop-smoking programs and medicines. These can increase your chances of quitting for good. · Prop up the head of your bed 4 to 6 inches by putting bricks under the legs of the bed. · Try a continuous positive airway pressure (CPAP) breathing machine if your doctor recommends it. The machine keeps your airway from closing when you sleep. It may take time for you to get used to CPAP. · If your nose feels dry or bleeds when you use one of these machines, talk with your doctor about increasing moisture in the air. A humidifier may help. · If your nose is runny or stuffy from using a breathing machine, talk with your doctor before using medicines to relieve congestion. · Talk to your doctor if you are sleepy during the day and it gets in the way of the normal things you do. Do not drive when you are drowsy. When should you call for help? Watch closely for changes in your health, and be sure to contact your doctor if you have any problems. Where can you learn more? Go to [email or web address] and sign in to your [employer] account. Enter A820 in the [facility] box to learn more about \"Heart Failure and Sleep Apnea: Care Instructions. \"     If you do not have an account, please click on the \"Sign Up Now\" link. Current as of: December 16, 2019               Content Version: 12.6  © 0041-2834 [employer]. Care instructions adapted under license by [facility] ([facility]). If you have questions about a medical condition or this instruction, always ask your healthcare professional. [address] any warranty or liability for your use of this information.

## 2021-02-09 NOTE — ANESTHESIA POSTPROCEDURE EVALUATION
Department of Anesthesiology  Postprocedure Note    Patient: Claudia Roth  MRN: [id number]  [place]: 1975  Date of evaluation: 2/9/2021  Time:  1:30 PM     Procedure Summary     Date: 02/09/21 Room / Location: [address] [place] [address] / [address] [place] [address]    Anesthesia Start: 1316 Anesthesia Stop: 2323    Procedure: EGD ESOPHAGOGASTRODUODENOSCOPY (N/A ) Diagnosis: (PRE BARIATRIC)    Surgeons: Vj Brumfield MD Responsible Provider: Aleta Rodriguez MD    Anesthesia Type: MAC ASA Status: 2          Anesthesia Type: MAC    Maira Phase I: Maira Score: 10    Maira Phase II:      Last vitals: Reviewed and per EMR flowsheets.        Anesthesia Post Evaluation    Patient location during evaluation: bedside  Patient participation: complete - patient cannot participate  Level of consciousness: awake  Pain score: 0  Airway patency: patent  Nausea & Vomiting: no nausea and no vomiting  Complications: no  Cardiovascular status: blood pressure returned to baseline  Respiratory status: acceptable  Hydration status: euvolemic

## 2021-02-09 NOTE — PROGRESS NOTES
Recovery mode, denies discomfort. Passing gas, taking fluids. Dr. Garg Nayan discussed findings and plan of care with pt and , understanding verbalized.

## 2021-02-09 NOTE — PROGRESS NOTES
 GERD (gastroesophageal reflux disease)     Hypercholesteremia     Hypertension     PONV (postoperative nausea and vomiting)     UTI (urinary tract infection)        Past Surgical History:   Procedure Laterality Date    CHOLECYSTECTOMY      ENDOMETRIAL ABLATION      HYSTERECTOMY ABDOMINAL N/A 12/12/2017    ROBOTIC TOTAL HYSTERECTOMY WITH BILATERAL SALPINGECTOMY performed by Kajal Villavicencio MD at [address] [place] [address]      vocal cord mass removed    OVARIAN CYST REMOVAL         Social History     Tobacco Use    Smoking status: Never Smoker    Smokeless tobacco: Never Used   Substance Use Topics    Alcohol use: No    Drug use: No       No Known Allergies    Current Outpatient Medications   Medication Sig Dispense Refill    FLUoxetine (PROZAC) 20 MG capsule Take 3 capsules by mouth daily 360 capsule 3    vitamin D 25 MCG (1000 UT) CAPS Take by mouth      dapagliflozin (FARXIGA) 5 MG tablet Take 1 tablet by mouth every morning 90 tablet 1    atorvastatin (LIPITOR) 20 MG tablet Take 1 tablet by mouth daily 90 tablet 1    Multiple Vitamins-Minerals (MULTIVITAMIN ADULT PO) Take by mouth      lisinopril-hydroCHLOROthiazide (PRINZIDE;ZESTORETIC) 20-12.5 MG per tablet Take 1 tablet by mouth daily 90 tablet 3     No current facility-administered medications for this visit. Family History   Problem Relation Age of Onset    Obesity Mother     Heart Disease Father     No Known Problems Sister     No Known Problems Brother         Any family history of any sleep problems or any one in your family on CPAP? Yes    Social History     Tobacco Use    Smoking status: Never Smoker    Smokeless tobacco: Never Used   Substance Use Topics    Alcohol use: No    Drug use: No     Caffeine Intake: How much soda (pop), coffee, tea, power drinks do you ingest per day? 1 per day. 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 Eyes: Negative. Upper respiratory tract: No nasal stuffiness or post nasal drip. Lower respiratory tract/ lungs: No cough or sputum production. No hemoptysis. Cardiovascular: No palpitations or chest pain. Gastrointestinal: No nausea or vomiting. Neurological: No focal neurologiacal weakness. Extremities: No edema. Musculoskeletal: No complaints. Genitourinary: No complaints. Hematological: Negative. Psychiatric/Behavioral: Negative. Skin: No itching. Physical Exam:    HEIGHTHeight: 5' 5\" (165.1 cm) WEIGHTWeight: (!) 313 lb 9.6 oz (142.2 kg)      BMI:  Body mass index is 52.19 kg/m². Neck Size: 16.25  Oxygen Sat: 98% on RA    ESS: 6  SAQLI 86  Vitals: /72 (Site: Left Upper Arm, Position: Sitting)   Pulse 86   Temp 96.4 °F (35.8 °C)   Ht 5' 5\" (1.651 m)   Wt (!) 313 lb 9.6 oz (142.2 kg)   SpO2 98% Comment: on RA  BMI 52.19 kg/m²       Mallampati Score: 1    Physical Exam :  Constitutional: Moderately built and moderately nourished. No distress. HENT:   Head: Normocephalic and atraumatic. Mouth/Throat: Oropharynx is clear and moist. No oral thrush. Mallampati  1  Eyes: Conjunctivae are normal. PERRLA. No scleral icterus. Neck: Neck supple. No JVD present. No tracheal deviation present. Circumference 16.5 inches. Cardiovascular: Normal rate, regular rhythm, normal heart sounds. No murmur heard. Pulmonary/Chest: Effort normal and breath sounds normal. No stridor. No respiratory distress. No wheezes. No rales. Abdominal: Soft. No distension. No tenderness. Musculoskeletal: Normal range of motion. Lymphadenopathy:  No cervical adenopathy. Neurological: Alert and oriented to person, place, and time. No focal deficits. Skin: Skin is warm and dry. Patient is not diaphoretic. Psychiatric: Normal behavior with normal mood and affect.     Diagnostic Data:    Assessment    Diagnosis Orders 1. At risk for obstructive sleep apnea     2. Morbid obesity with BMI of 50.0-59.9, adult ([facility] [address]     3. Snoring       Although BMI, snoring and neck circumference place Francheska at high risk for SDB, she  denies symptoms : ESS 6, SAQLI: 86  No daytime somnolence, no HTN, no cognitive impairment. Plan     Declines PSG at this time, her sleep questionnaire is reassuring, Mani López was provided with written info on AVERY and asked to return to office if her condition changes. There is a possibility that she may have undiagnosed AVERY if sleep diagnostics not undertaken and Mani López agrees and prefers to to do a sleep study as she believes it is not necessary. Continue wt reduction  RTC for change in condition    Mask Desensitization and Pre study teaching? No  Weight Loss Information Given? No  Sleep Hygiene Discussed? Yes    -She was advised to call [employer] regarding supplies if needed.  -She call my office for earlier appointment if needed for worsening of sleep symptoms. -Nayeli Bauer educated about my impression and plan. Patient verbalizes understanding.

## 2021-02-10 NOTE — OP NOTE
to how to manage GERD. We will set it up in couple of months. BLOOD LOSS:  None.         Odalys Abdalla M.D.    D: 02/09/2021 13:40:00       T: 02/09/2021 16:52:30     [email or web address]  Job#: [id number]     Doc#: [id number]    CC:  Family Physician

## 2021-02-19 NOTE — PROGRESS NOTES
Assessment: Patient is a 39 y.o. female seen for   month three  follow up MNT visit for  pre op bariatric surgery desires sleeve    Vitals from current and previous visits:  Vitals 8/46/4555   SYSTOLIC 190   DIASTOLIC 70   Site Right Upper Arm   Position Sitting   Cuff Size Large Adult   Pulse 74   Temp 97.2   Resp 18   SpO2    Weight 310 lb 6.4 oz   Height 5' 5.25\"   Body mass index 51.25 kg/m2   Pain Level        Initial weight at start of Weight Management Program was: 319 lbs     Jammie Osuna gained 1 lb over one month  -Weight goal: lose weight.     -Nutritionally relevant labs: Initial labs noted : labs-A1C-6.4%, TG-274, chol-240, LDL-143, vit D-35, glucose-139  Lab Results   Component Value Date/Time    LABA1C 6.4 11/14/2020 07:29 AM    LABA1C 7.2 (H) 01/12/2019 10:50 AM    LABA1C 6.3 08/11/2017 09:19 AM    GLUCOSE 139 (H) 11/14/2020 07:29 AM    GLUCOSE 132 (H) 11/16/2019 11:21 PM    CHOL 240 (H) 11/14/2020 07:29 AM    HDL 42 11/14/2020 07:29 AM    LDLCALC 143 11/14/2020 07:29 AM    TRIG 274 (H) 11/14/2020 07:29 AM                  Lab Results   Component Value Date    VITD25 35 11/14/2020     Pt started on statin by PCP due to elevated lipids and DM medication is changing  - Is patient taking daily Multivitamin:  Pt does Take a MVI - Women's Equate Brand, Vitamin D3 daily now 5,000IU's     EGD done- chronic reflux esophagitis   Dr Derek Page initial visit is March 10th, 2021 and f/u is 3/25/21    -Food Recall: Working day shift 5:45a-2pm or  From  8:30a-5pm  Verbal recall taken  Breakfast: two slices cisneros- before work today- discussed cisneros falling into fat category vs protein food and reviewed sources of lean protein food options in am.  Lunch: today had oven baked chicken and took skin off, cauliflower. Dinner: cooking meals for dinner - meats, vegetables at home   Snacks: denies much snacking between meals - may have almonds, or cottage cheese.   Denies snacking in evening  Main Beverages: has cut out diet pepsi, Tazo Brand Unsweet Passion or Mint caffeine free  Tea ,   100 oz water daily - some Powerade or Gatorade Zero   -Impression of Dietary Intake: on average, 3 meals per day. - Pt  is working towards  avoiding high fat/high sugar foods. Pt  is working towards  including protein at meals and snacks. Exercise:  Patient has not attended Fitness Orientation- on hold right now  -Physical Activity is May do a Yoga video on weekend. walking with spouse ~ 30-40 minutes once a week and sometimes twice a week either outside or at [employer]. Reviewed with pt importance of increasing overall physical activity. Nutrition Diagnosis: Overweight/Obesity related to currently undergoing MNT as evidenced by BMI of 51  Intervention:   Healthy behaviors: eliminated carbonated beverages, not skipping meals and adequate fluid intake    Patient has  attended support groups online times two. Pt has completed three months of medically supervised weight loss. Pt ready from nutrition standpoint to proceed with bariatric surgery. Reviewed out of pocket cost for bariatric vitamins and protein powder. Samples given to pt today. Handouts given: Bariatric Vitamins/Protein Cost and Recommendations and Vitamin Samples    Patient Instructions   Goals:  1. Nutrition Goal:  Aim for three consistent meal times a day. Remember order of how you want to eat- choose lean protein food first, followed by vegetables and fruit, then starch food last if still hungry. 2. Water Goal:  I will make sure drinking at least 64 oz of water a day or greater- great job with this! 3. Exercise Goal:  I will walk outside with  for ~ 30-40 minutes at least one-two times a week  Stay as active as you can - exercise is important!   Call office to schedule with Dr Enmanuel Stahl on [facility] 26th or April 9th in morning -[phone]            -Followup visit: pre op teaching class upon insurance approval        Yao Sheth RD, LD  Dietitian- Weight [address] [facility]

## 2021-02-22 ENCOUNTER — OFFICE VISIT (OUTPATIENT)
Dept: BARIATRICS/WEIGHT MGMT | Age: 46
End: 2021-02-22

## 2021-02-22 ENCOUNTER — OFFICE VISIT (OUTPATIENT)
Dept: BARIATRICS/WEIGHT MGMT | Age: 46
End: 2021-02-22
Payer: COMMERCIAL

## 2021-02-22 VITALS
TEMPERATURE: 97.2 F | RESPIRATION RATE: 18 BRPM | HEART RATE: 74 BPM | WEIGHT: 293 LBS | HEIGHT: 65 IN | SYSTOLIC BLOOD PRESSURE: 100 MMHG | DIASTOLIC BLOOD PRESSURE: 70 MMHG | BODY MASS INDEX: 48.82 KG/M2

## 2021-02-22 DIAGNOSIS — E11.9 DIABETES MELLITUS TYPE II, NON INSULIN DEPENDENT (HCC): ICD-10-CM

## 2021-02-22 DIAGNOSIS — E78.5 HYPERLIPIDEMIA, UNSPECIFIED HYPERLIPIDEMIA TYPE: ICD-10-CM

## 2021-02-22 DIAGNOSIS — F32.A DEPRESSION, UNSPECIFIED DEPRESSION TYPE: ICD-10-CM

## 2021-02-22 DIAGNOSIS — E66.01 MORBID OBESITY WITH BMI OF 50.0-59.9, ADULT (HCC): Primary | ICD-10-CM

## 2021-02-22 DIAGNOSIS — I10 ESSENTIAL HYPERTENSION: ICD-10-CM

## 2021-02-22 PROCEDURE — 99213 OFFICE O/P EST LOW 20 MIN: CPT | Performed by: PHYSICIAN ASSISTANT

## 2021-02-22 NOTE — PROGRESS NOTES
[address] [facility]  [address]  [facility], [address] [facility]  [phone]      Visit Date:  2/22/2021  Weight Management Pre-Op Follow-up    HPI:    Medically Supervised follow-up- Month #2 of 3- desires mart Chase is here today for continued supervised weight management of morbid obesity. Weight today 310#. Up 1# since last month. Continues to be down 9# since starting with weight management. BMI 51. Nutrition was off when she was on vacation in [place]. Back on track now. Tracking all food intake. Eating 3 meals daily. Making good choices with nutrition at this point. Doing better with portion control. Has started following with [employer] which has helped her with mindful eating. Back to drinking 100oz of water daily. Continues to track steps- averaging 7-8k steps daily. Comorbid conditions include diabetes mellitus, hyperlipidemia, hypertension and depression. Checking sugars routinely and running 120's fasting. Last A1C 6.4. LOMN received. Completed support groups. EKG completed and reviewed. EGD completed and reviewed. Path report reviewed. H. Pylori (-). Consult with Dr. Kiel Saavedra reviewed. No sx of AVERY. Declined PSG. Psychological clearance with Dr. Lanier Mins scheduled for 3/10/21 and 3/25/21. Seca scale completed and reviewed. If she does not lose enough weight with medical management she would like to proceed with sleeve gastrectomy. Current BMI: Body mass index is 51.26 kg/m².   Current Weight:   Wt Readings from Last 3 Encounters:   02/22/21 (!) 310 lb 6.4 oz (140.8 kg)   02/09/21 (!) 313 lb (142 kg)   02/09/21 (!) 313 lb 9.6 oz (142.2 kg)     Initial Body Weight:319    Past Medical History:  Past Medical History:   Diagnosis Date    Back pain     Depression     Diabetes mellitus (HCC)     GERD (gastroesophageal reflux disease)     Hypercholesteremia     Hypertension     PONV (postoperative nausea and vomiting) Medication Sig Dispense Refill    FLUoxetine (PROZAC) 20 MG capsule Take 3 capsules by mouth daily 360 capsule 3    vitamin D 25 MCG (1000 UT) CAPS Take by mouth      dapagliflozin (FARXIGA) 5 MG tablet Take 1 tablet by mouth every morning 90 tablet 1    atorvastatin (LIPITOR) 20 MG tablet Take 1 tablet by mouth daily 90 tablet 1    Multiple Vitamins-Minerals (MULTIVITAMIN ADULT PO) Take by mouth      lisinopril-hydroCHLOROthiazide (PRINZIDE;ZESTORETIC) 20-12.5 MG per tablet Take 1 tablet by mouth daily 90 tablet 3     No current facility-administered medications for this visit. Allergies:   No Known Allergies    Subjective:    Review of Systems:  Constitutional: Denies any fever, chills, (+) fatigue. Wound: Denies any rash, skin color changes or wound problems. Resp: Denies any cough, shortness of breath. CV: Denies any chest pain, orthopnea or syncope. MS: Denies myalgias, (+)arthralgias-back  GI: Denies any nausea, vomiting,(+) diarrhea, constipation, melena, hematochezia. : Denies any hematuria, hesitancy or dysuria. NEURO: Denies seizures, headache. Objective:    /70 (Site: Right Upper Arm, Position: Sitting, Cuff Size: Large Adult)   Pulse 74   Temp 97.2 °F (36.2 °C) (Infrared)   Resp 18   Ht 5' 5.25\" (1.657 m)   Wt (!) 310 lb 6.4 oz (140.8 kg)   BMI 51.26 kg/m²   Physical Examination:   Constitutional: Alert and oriented to person, place and time. Well-developed, well- nourished. Head: Normocephalic and atraumatic  Neck: Supple. Eyes: EOMI b/l. Conjunctivae normal.  No scleral icterus. Respiratory: Effort normal. No respiratory distress. Abd: Benign  Ext:  Movement x 4. No edema  Skin; Warm and dry, no visible rashes, lesions or ulcers.    Neuro: Cranial Nerves Grossly Intact; nml coordination      CBC   Lab Results   Component Value Date    WBC 8.4 11/14/2020    RBC 4.54 11/14/2020    HGB 13.9 11/14/2020    HCT 41.1 11/14/2020    MCV 90.5 11/14/2020 MCH 30.6 11/14/2020    MCHC 33.8 11/14/2020    RDW 13.1 12/30/2017     11/14/2020    MPV 11.4 11/14/2020    RBCMORP NORMAL 08/11/2017    SEGSPCT 61.2 11/14/2020    LABLYMP 31.6 11/14/2020    MONOPCT 5.0 11/14/2020    LABEOS 1.3 11/14/2020    BASO 0.4 11/14/2020    NRBC 0 11/14/2020    SEGSABS 5.1 11/14/2020    LYMPHSABS 2.7 11/14/2020    MONOSABS 0.4 11/14/2020    EOSABS 0.1 11/14/2020    BASOSABS 0.0 11/14/2020        BMP/CMP   Lab Results   Component Value Date    GLUCOSE 139 11/14/2020    CREATININE 0.6 11/14/2020    BUN 16 11/14/2020     11/14/2020    K 4.2 11/14/2020     11/14/2020    CO2 24 11/14/2020    CALCIUM 9.2 11/14/2020    AST 27 11/14/2020    ALKPHOS 76 11/14/2020    PROT 7.1 11/14/2020    LABALBU 4.3 11/14/2020    BILITOT 0.6 11/14/2020    ALT 49 11/14/2020        PREALBUMIN   Lab Results   Component Value Date    PREALBUMIN 26.9 11/14/2020        VITAMIN B12   Lab Results   Component Value Date    KWIJINNE64 415 11/14/2020        VITAMIN D   Lab Results   Component Value Date    VITD25 35 11/14/2020        PTH  Lab Results   Component Value Date    IPTH 53.6 11/14/2020       VITAMIN B1/ THIAMINE   Lab Results   Component Value Date    ULGH7CBSZHW 144 11/14/2020        LIPID SCREEN (FASTING)   Lab Results   Component Value Date    CHOL 240 11/14/2020    TRIG 274 11/14/2020    HDL 42 11/14/2020    [address] 143 11/14/2020   ,     HGA1C (T2DM ONLY)   Lab Results   Component Value Date    LABA1C 6.4 11/14/2020    AVGG 132 11/14/2020        TSH   Lab Results   Component Value Date    TSH 2.570 11/14/2020        IRON   Lab Results   Component Value Date    IRON 95 11/14/2020        TIBC  Lab Results   Component Value Date    TIBC 304 11/14/2020       FERRITIN  Lab Results   Component Value Date    FERRITIN 111 11/14/2020       VITAMIN A  Lab Results   Component Value Date    RETINOL SEE BELOW 11/14/2020       NICOTINE  No results found for: NMET    UDS  Lab Results Component Value Date    UDP SEE BELOW 11/14/2020       PSA  No results found for: LABPSA    GFR  Lab Results   Component Value Date    LABGLOM >90 11/14/2020       DEXA  No results found for this or any previous visit. Assessment:       Diagnosis Orders   1. BMI 50.0-59.9, adult ([facility]     2. Essential hypertension     3. Diabetes mellitus type II, non insulin dependent [facility]     4. Hyperlipidemia, unspecified hyperlipidemia type     5. Depression, unspecified depression type         Plan:    · Behavior modification discussed in detail in regards to dietary habits. · Nutritional education occurred during visit. Continue following recommendations  per dietitian. · Improvement in fitness/exercise discussed with patient and the need for this  with/without surgery. · Schedule orientation with Alina Spivey, Exercise Physiologist, at the fitness  center. · Evaluation with Dr. Merlinda Blow reviewed- declines PSG  · Psychology evaluation with Dr. Dena Boo 3/10/21 and 3/25/21  · EGD completed and reviewed. Path report reviewed. H. Pylori (-)  · Encouraged to attend support groups. Has attended x 1. Information given on upcoming support groups  · Goal to lose 5% TBW prior to surgery. Current down 9#  · Seca scale completed and reviewed. · EKG completed and reviewed. · Advised not to get pregnant for 18-24 months post bariatric surgery as this can increase risk of malnourishment potentially leading to low birth weight or malformation. (had hysterectomy)  · Will need to be off work for 3-4 weeks post-bariatric surgery. · No lifting/pushing/pulling over 20# for 4 weeks post-op  · No NSAIDS 10 days prior to bariatric surgery. Avoid NSAID use post-op  · Discussed Lovenox post-op bariatric surgery  · LOMN received. ·  Continue with [employer]  Return in about 1 month (around 3/22/2021) for Follow up. I spent over 20 minutes with the patient, with greater that 50% of that time spent on education, counseling and coordination of care.      Electronically signed by NADEEM Loredo on 2/22/2021 at 3:59 PM

## 2021-03-10 ENCOUNTER — OFFICE VISIT (OUTPATIENT)
Dept: PSYCHIATRY | Age: 46
End: 2021-03-10
Payer: COMMERCIAL

## 2021-03-10 DIAGNOSIS — Z87.828 HISTORY OF TRAUMA: ICD-10-CM

## 2021-03-10 DIAGNOSIS — F41.9 ANXIETY DISORDER, UNSPECIFIED TYPE: Primary | ICD-10-CM

## 2021-03-10 DIAGNOSIS — E66.01 MORBID OBESITY (HCC): ICD-10-CM

## 2021-03-10 PROCEDURE — 96131 PSYCL TST EVAL PHYS/QHP EA: CPT | Performed by: PSYCHOLOGIST

## 2021-03-10 PROCEDURE — 90791 PSYCH DIAGNOSTIC EVALUATION: CPT | Performed by: PSYCHOLOGIST

## 2021-03-10 PROCEDURE — 96130 PSYCL TST EVAL PHYS/QHP 1ST: CPT | Performed by: PSYCHOLOGIST
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
## 2021-03-25 ENCOUNTER — VIRTUAL VISIT (OUTPATIENT)
Dept: PSYCHIATRY | Age: 46
End: 2021-03-25
Payer: COMMERCIAL

## 2021-03-25 DIAGNOSIS — Z87.828 HISTORY OF TRAUMA: ICD-10-CM

## 2021-03-25 DIAGNOSIS — F41.9 ANXIETY DISORDER, UNSPECIFIED TYPE: Primary | ICD-10-CM

## 2021-03-25 DIAGNOSIS — E66.01 MORBID OBESITY (HCC): ICD-10-CM

## 2021-03-25 PROCEDURE — 90832 PSYTX W PT 30 MINUTES: CPT | Performed by: PSYCHOLOGIST

## 2021-03-25 NOTE — PROGRESS NOTES
This note will not be viewable in Power-One for the following reason(s). This is a Psychotherapy Note. TELEPSYCHOLOGY VISIT -- Audio/Visual (During [id number]-77 public health emergency)    This session was conducted as a telepsychology visit due to one or more of the following COVID-19 risk factors being present in this patient:   The patient is aged 61 or older   The patient reports chronic health problems   The patient reports immune suppressed or immune compromised status   The patient reports being at risk or potentially exposed to the virus    Patient Location: Home    Provider Location ([facility]/State): [facility]    Patient gave verbal consent for teleservices. This virtual visit was conducted via interactive/real-time audio/video. PSYCHOLOGICAL FOLLOW-UP FOR BARIATRIC SURGERY:      History of Presenting Illness:   Yakov Chase was initially referred for a routine psychological evaluation for bariatric surgery on 03/10/2021. At this evaluation, the following requirements were given prior to psychological clearance for WLS:  - continue to take psychiatric medications as prescribed  - continue to make progress towards pre-surgical weight loss goal    Assessment:  Patient reports that things have been going well overall. Eating behaviors: weekend grazing is improving and she reports having smaller portions and better choices. She notes almonds have been helpful. Denies skipping meals and notes she is \"smart snacking. \" She reports having cottage cheese, almonds, hard boiled eggs, and 100 ounces of water per day. She reports she has been able to manage emotional eating as she has been busier than normal with [employer], and traveling back and forth to [facility] [place] for her daughter's GI concerns. Caffeine use: 1 cup of coffee per day in the morning. Carbonation: Denies any carbonation use and any other caffeine use. Exercise: she reports walking every night for 30-40 minutes.   She reports doing interval training where she walks for 3 minutes and runs for 1 minute. This is an increase in time, frequency, and intensity. She reports going up and down hills instead of avoiding. Weight loss: she reports her weight is continuing to fluctuate. She notes on her home scale she weighs 80 which reflects a 2 pound weight loss since last visit on 3/10/2021. She reports she has started to log her food intake in her phone/blue journal.     Mood: she reports her mood has been \"very good,\" and notes that the nicer weather has also been helpful in improving her mood. Psychiatric medications: She reports she continues to take Prozac 60mg QD, and reports melatonin 5mg approximately twice per week. Medical Numbers: 3/4; patient able to complete basic math necessary for independent medication changes. Psychological Interpretation of MMPI-2: Patient's MMPI-2 clinical profile should be interpreted with slight caution due to elevations on scales K, and S. Patient did not have any true spikes in clinical scales; however, she did have 2 scales that were lower than what would be expected. Scales 9 and 0 were low which suggest that patient may have low energy, and take more time than expected to move, and get going for the day. This also suggests that patient is more extroverted and prefers to be around people versus in isolation. She reports feeling low energy some days, but not a majority of the time. She reports feeling low energy, slow movements once in awhile versus feeling this way on a daily basis. She notes on the day of testing, she probably did feel tired which is why this is reflected on clinical scales.        MENTAL STATUS EXAM  General appearance: dressed appropriate, morbidly obese  Attitude & Behavior: pleasant and cooperative  Motor Activity & Musculoskeletal: intact  Speech & Language: normal rate, volume, and prosody  Gait & Station: gait steady  Mood: content  Affect: congruent with mood, appropriate to setting  Thought content: appropriate  Suicidal ideation: denies  Homicidal ideation: denies  Thought process & Associations:logical, coherent, goal-directed  Perceptions: appropriate  Orientation: alert and oriented to person, place, time, and situation  Attention & Concentration: appears intact  Recent & Remote Memory: average  Executive function: appears intact  Visual spatial: appears intact  Fund of knowledge: average  Insight: average  Judgment: average     DSM DIAGNOSIS:  Anxiety Disorder, unspecified, in remission  History of Trauma  Morbid Obesity    PLAN:  Deana Rivera has completed minimum requirements and is now cleared from a psychological/substance perspective for bariatric surgery. Psychology Clinician:  Sancho Najera, PhD     Start time: 8:53am  End time: 9:15am      Electronically signed by Avis Galvez, PhD on 3/25/21 at 8:54 AM EDT       Pursuant to the emergency declaration under the [facility] waiver authority and the [employer] Act, this Virtual Visit was conducted, with patient's consent, to reduce the patient's risk of exposure to COVID-19 and provide continuity of care for an established patient. Services were provided through a video synchronous discussion virtually to substitute for in-person clinic visit.

## 2021-04-08 ENCOUNTER — TELEPHONE (OUTPATIENT)
Dept: BARIATRICS/WEIGHT MGMT | Age: 46
End: 2021-04-08

## 2021-04-14 DIAGNOSIS — Z01.818 PRE-OP TESTING: Primary | ICD-10-CM

## 2021-04-29 ENCOUNTER — CLINICAL DOCUMENTATION (OUTPATIENT)
Dept: PHARMACY | Facility: CLINIC | Age: 46
End: 2021-04-29

## 2021-04-29 NOTE — LETTER
San[address] [address]  [address], [address]  Phone: toll free [phone] option [address] [facility]  [address] [facility] [address] [facility] [address]           04/29/21     Dear Brody Kingston! You have successfully enrolled in the Be Well With Diabetes program for 2021. What you receive  Beginning May 1, you will begin receiving up to $600 in waived copays for specific medications and pharmacy-related supplies purchased through our home delivery pharmacy, [facility]. A list of eligible medications and pharmacy supplies can be found at [employer] under Be Well With Diabetes. In addition, youll receive advice and help from our pharmacists, associate care management team and diabetes educators. (And, if you also participate in the Be Well program, you can earn points and Lifestyle Management or Health Management program credit, if applicable.)    What you need to do  To maintain your benefit this year and remain eligible next year, complete the following requirements:          *Can be satisfied through [employer] Health Screening on-site. **Requirements to enroll must be completed within 6 months of enrollment date **Pneumonia vaccine is dependent on previous immunization and your age. Remember, program requirements must be completed by deadlines shown. If not, your benefit may be terminated, and you will not be eligible to participate again until the following year. To keep you on track, well review your [employer] account and send reminders for action. (If you dont have a [address], submit documentation to [email or web address] or by fax to [phone].)    Congratulations and thank you for taking steps to improve your health and to Be Well With Diabetes. [address] [facility]  Phone: [phone], option 7  Email: [email or web address]  Fax: [phone]  Fax Number: [phone]

## 2021-04-29 NOTE — PROGRESS NOTES
Pharmacy Pop Care Documentation:   Patient has completed all the requirements and therefore will be enrolled in the DM Program on 05/01/21. Application received: via [employer]    Letter mailed to patient.     Douglas Ferrara, [address]   Department, toll free: [phone], option 7

## 2021-05-03 ENCOUNTER — CLINICAL DOCUMENTATION (OUTPATIENT)
Dept: PHARMACY | Facility: CLINIC | Age: 46
End: 2021-05-03

## 2021-05-03 NOTE — PROGRESS NOTES
Pharmacy Pop Care Documentation:      The application for Alejandra Valadez for enrollment into the diabetes management program has been reviewed and accepted on 5/3/21.     Janae Sena

## 2021-05-05 ENCOUNTER — TELEPHONE (OUTPATIENT)
Dept: PHARMACY | Facility: CLINIC | Age: 46
End: 2021-05-05

## 2021-05-05 NOTE — LETTER
[address] [facility] [address]  [address], [address]        Liz Robbins   [place] Morse  [address] [address]         05/07/21   Dear Liz Robbins,    Congratulations! You have completed the requirements for the [facility] Be Well with Diabetes Program and have been enrolled into the Program as of May 1st 2021. One of the requirements to participate in the [facility] Be Well with Diabetes Program is to complete a Clinical Pharmacist Telephone appointment yearly. The [address] [place] [address] Team has attempted to contact you to schedule your 2021 Diabetes Management telephone appointment but was unable to reach you. We would like to work with you and your doctor to:  - Review your medications, including over-the-counter and herbal medications  - Answer questions about your medications and how to get the most benefit from them  - Identify potential drug interactions or side effects and help fix them  - Identify preferred medications that are equally effective, but available at a lower cost to you  - Help you reach the necessary requirements to remain enrolled in the Be Well with Diabetes Program offered by [facility]     Please call [phone] and select option #7 to schedule this appointment to take advantage of this service. Telephone appointments are available Monday thru Friday from 7:30 AM till 5:30 PM.     This is a courtesy reminder. If you have this appointment already scheduled for your 2020 enrollment in the program, please disregard this message. If you have not scheduled this appointment yet, please contact us at the above number to schedule. Sincerely,   [address] [facility]  Phone: [phone], option 7  Email: [email or web address]  Fax: [phone]

## 2021-05-05 NOTE — TELEPHONE ENCOUNTER
Called patient to schedule 2021 yearly pharmacist appointment to discuss medications for Diabetes Management Program.    No answer. Left VM. Please call back at [phone] Option #7. [employer] message sent to patient.       Terryshawnarobyn Elizabethzahida, [address]   Department, toll free: [phone], option 7

## 2021-05-07 NOTE — TELEPHONE ENCOUNTER
Second attempt made to contact patient to schedule 2021 yearly pharmacist appointment to discuss medications for Diabetes Management Program.    No answer. Left VM. Please call back at [phone] Option #7. Previous [employer] message not read by patient. Letter mailed.       Callie Nelson, [address] [facility]   Department, toll free: [phone], option 7

## 2021-05-11 NOTE — TELEPHONE ENCOUNTER
Spoke to patient and appointment scheduled for 5/14/21 at 2:00pm.     Rosanna Press, [address]   Department, toll free: [phone], option 7

## 2021-05-14 ENCOUNTER — INITIAL CONSULT (OUTPATIENT)
Dept: BARIATRICS/WEIGHT MGMT | Age: 46
End: 2021-05-14
Payer: COMMERCIAL

## 2021-05-14 ENCOUNTER — SCHEDULED TELEPHONE ENCOUNTER (OUTPATIENT)
Dept: PHARMACY | Facility: CLINIC | Age: 46
End: 2021-05-14

## 2021-05-14 VITALS
RESPIRATION RATE: 18 BRPM | HEART RATE: 66 BPM | TEMPERATURE: 97.3 F | SYSTOLIC BLOOD PRESSURE: 124 MMHG | BODY MASS INDEX: 50.73 KG/M2 | WEIGHT: 293 LBS | DIASTOLIC BLOOD PRESSURE: 76 MMHG

## 2021-05-14 DIAGNOSIS — F32.A DEPRESSION, UNSPECIFIED DEPRESSION TYPE: ICD-10-CM

## 2021-05-14 DIAGNOSIS — E11.9 DIABETES MELLITUS TYPE II, NON INSULIN DEPENDENT (HCC): ICD-10-CM

## 2021-05-14 DIAGNOSIS — M54.50 CHRONIC BILATERAL LOW BACK PAIN WITHOUT SCIATICA: ICD-10-CM

## 2021-05-14 DIAGNOSIS — G89.29 CHRONIC BILATERAL LOW BACK PAIN WITHOUT SCIATICA: ICD-10-CM

## 2021-05-14 DIAGNOSIS — K21.9 GASTROESOPHAGEAL REFLUX DISEASE, UNSPECIFIED WHETHER ESOPHAGITIS PRESENT: ICD-10-CM

## 2021-05-14 DIAGNOSIS — E66.01 MORBID OBESITY WITH BMI OF 50.0-59.9, ADULT (HCC): Primary | ICD-10-CM

## 2021-05-14 DIAGNOSIS — I10 ESSENTIAL HYPERTENSION: ICD-10-CM

## 2021-05-14 DIAGNOSIS — E78.5 HYPERLIPIDEMIA, UNSPECIFIED HYPERLIPIDEMIA TYPE: ICD-10-CM

## 2021-05-14 PROCEDURE — 99213 OFFICE O/P EST LOW 20 MIN: CPT | Performed by: SURGERY

## 2021-05-14 RX ORDER — BLOOD SUGAR DIAGNOSTIC
1 STRIP MISCELLANEOUS 2 TIMES DAILY
Qty: 200 EACH | Refills: 3 | Status: SHIPPED | OUTPATIENT
Start: 2021-05-14 | End: 2021-09-23

## 2021-05-14 RX ORDER — BLOOD GLUCOSE CNTL HIGH,NORMAL
1 EACH MISCELLANEOUS PRN
Qty: 1 EACH | Refills: 0 | Status: SHIPPED | OUTPATIENT
Start: 2021-05-14 | End: 2021-09-23

## 2021-05-14 RX ORDER — LANCETS 33 GAUGE
1 EACH MISCELLANEOUS 2 TIMES DAILY
Qty: 200 EACH | Refills: 3 | Status: SHIPPED | OUTPATIENT
Start: 2021-05-14 | End: 2021-09-23

## 2021-05-14 RX ORDER — BLOOD-GLUCOSE METER
1 KIT MISCELLANEOUS ONCE
Qty: 1 KIT | Refills: 0 | Status: SHIPPED | OUTPATIENT
Start: 2021-05-14 | End: 2021-09-23

## 2021-05-14 RX ORDER — PERPHENAZINE/AMITRIPTYLINE HCL 2 MG-10 MG
1 TABLET ORAL DAILY
COMMUNITY
End: 2021-10-19

## 2021-05-14 NOTE — TELEPHONE ENCOUNTER
Dr. Adele Toney, APRN - CNP,    Your patient is currently enrolled in the Be Well with Diabetes program. After your patient's recent visit with a [facility] Clinical Pharmacist, the below were identified as opportunities to assist with their diabetes management (if patient is not eligible for below recommendations, please reply with reason/contraindication):   · Patient can receive Ag[employer] Jazz glucometer and supplies for $0.00 co-pay. I have pended prescriptions for your convenience. See pharmacist note dated 5/14/21 for complete details. To remain active in the program, the patient is to meet the requirements and documentation must be provided by pre-established deadlines (see below).        Program Requirements  Initial Program Requirements (to be completed by 07/01/2021):  · A1c (1st)    Ongoing Program Requirements (to be completed by 12/31/2021):  · A1c (2nd)  · Diabetes Education if A1c >8%  · Lipid panel  · Urine microalbumin   · Influenza vaccination for Fall 2021    Thank you,  Charmaine Varner, PharmD, [facility]  Direct: [phone]  Department, toll free [phone], option 7

## 2021-05-14 NOTE — TELEPHONE ENCOUNTER
[facility] CLINICAL PHARMACY REVIEW - Be Well with Diabetes    Jeremy Lewis is a 39 y.o. female enrolled in the [address] [place] [address] Diabetes Program. Patient enrolled 5/1/21. Medications:  Medication Sig    Misc Natural Products (CRANBERRY/PROBIOTIC) TABS Take 1 tablet by mouth daily    FLUoxetine (PROZAC) 20 MG capsule Take 3 capsules by mouth daily    vitamin D 25 MCG (1000 UT) CAPS Take 1 capsule by mouth daily     dapagliflozin (FARXIGA) 5 MG tablet  · Covered by DM program Take 1 tablet by mouth every morning    atorvastatin (LIPITOR) 20 MG tablet  · Covered by DM program Take 1 tablet by mouth daily    Multiple Vitamins-Minerals (MULTIVITAMIN ADULT PO) Take 1 tablet by mouth daily     lisinopril-hydroCHLOROthiazide (PRINZIDE;ZESTORETIC) 20-12.5 MG per tablet  · Covered by DM program Take 1 tablet by mouth daily     Current Pharmacy: [address] [place] [address]  - discussed that if she fills her lisinopril/hctz at [facility] (atorvastatin and Gala Genin are already $0.00 co-pay at the site pharmacy) it would be free with the program and would save her $18.11 co-pay. Patient has already downloaded the [facility] guero - provided pt with [facility] phone number to call when she needs another refill. Patient agreeable to receiving all of her medications from [facility] in the future. Current testing supplies/frequency: [employer] - tests twice daily. Patient cannot find her glucometer - would like Agamatrix Elen. Will pend prescriptions for her PCP today.      Allergies:  No Known Allergies     Vitals/Labs:  BP Readings from Last 3 Encounters:   05/14/21 124/76   02/22/21 100/70   02/09/21 (!) 93/50     Lab Results   Component Value Date    LABMICR 1.49 01/12/2019     Lab Results   Component Value Date    LABA1C 6.4 11/14/2020    LABA1C 7.2 (H) 01/12/2019    LABA1C 6.3 08/11/2017     Lab Results   Component Value Date    CHOL 240 (H) 11/14/2020    TRIG 274 (H) 11/14/2020    HDL 42 11/14/2020    LDLCALC 143 11/14/2020     ALT   Date Value Ref Range Status   11/14/2020 49 11 - 66 U/L Final     Comment:     Performed at [address], [address]     AST   Date Value Ref Range Status   11/14/2020 27 5 - 40 U/L Final     The 10-year ASCVD risk score (Adi Winn, et al., 2013) is: 3.8%    Values used to calculate the score:      Age: 39 years      Sex: Female      Is Non- : No      Diabetic: Yes      Tobacco smoker: No      Systolic Blood Pressure: 493 mmHg      Is BP treated: Yes      HDL Cholesterol: 42 mg/dL      Total Cholesterol: 240 mg/dL     Lab Results   Component Value Date    CREATININE 0.6 11/14/2020     CrCl cannot be calculated (Patient's most recent lab result is older than the maximum 120 days allowed. ).     Lab Results   Component Value Date    LABGLOM >90 11/14/2020    LABGLOM >90 11/16/2019    LABGLOM >90 11/06/2019    LABGLOM 69 01/12/2019     Immunizations:  Immunization History   Administered Date(s) Administered    Hepatitis A Adult (Havrix, Vaqta) 06/20/2019    Influenza Virus Vaccine 10/15/2020    Pneumococcal Polysaccharide ([id number]) 08/01/2017    Tdap (Boostrix, Adacel) 03/07/2010      Social History:  Social History     Tobacco Use    Smoking status: Never Smoker    Smokeless tobacco: Never Used   Substance Use Topics    Alcohol use: No     ASSESSMENT:  Initial Program Requirements (Y indicates has completed for the year, N indicates needs to be completed by 07/01/2021):  Yes - Provider Visit for DM (1st)   No - A1c (1st)     Ongoing Program Requirements (Y indicates has completed for the year, N indicates needs to be completed by 12/31/2021):  Yes - Provider Visit for DM (2nd)  No - ACC/diabetes educator visit (if A1c over 8%) - likely will not need, A1c historically stable < 7%  No - A1c (2nd)  No - Lipid panel  No - Urine microalbumin  Yes - Pneumococcal vaccination: Up-to-date, not needed again until age 74  No - Influenza vaccination for Fall 2021  No - Medication adherence over 70%  Yes - On statin - atorvastatin  Yes - On ACEi/ARB - lisinopril/HCTZ     Formulary Medication Review:  Non-formulary or medications with cost-effective alternatives: none. Current medications eligible for copay waiver, up to $600, through [address]:  - Atorvastatin, Adrian Bras and lisinopril/HCTZ  - Agamatrix Jazz     Diabetes Care:   - Glycemic Goal: <7.0%. Unable to assess if at blood glucose goal. Type 2 DM under acceptable control as evidenced by most recent A1c < 7%. - Duplicate MOA: none  - Reduce Pill [place]: n/a; Farxiga only  - Appropriateness of Insulin Therapy: n/a  - Therapy Optimization: n/a  - De-escalation of Therapy: n/a  - Daily aspirin? No: not indicated  - Adherent to ACEi/ARB and/or statin: appears adherent per claims review  - Medication compliance: compliant all of the time  - Diet compliance: participating in [facility] and meets with RD at bariatric office visits. Other Considerations:  - Blood Pressure Goal: BP less than 140/90 mmHg due to history of DM: Is at blood pressure goal.   - Lipids: Patient is prescribed moderate-intensity statin therapy. - Smoking status: Never smoked    PLAN:  - Consideration(s) for provider:   · Patient requests [employer] glucometer and testing supplies. Will pend orders in separate encounter today. - DM program gaps identified:   · Initial requirements: A1c (1st)   · Ongoing requirements: ACC/diabetes educator visit (if A1c over 8%), A1c (2nd), Lipid panel, Urine microalbumin, Influenza vaccination for [id number] and Medication adherence over 70%   - Education to patient: Overview of Be Well With Diabetes program, Overview of [facility]HP and Reminder A1c and lipid panel can be completed for free at Be Well screenings   - Follow up: PCP for identified gaps or as scheduled below  - Upcoming appointments:   No future appointments.     Woody Barreto, PharmD,

## 2021-05-15 ASSESSMENT — ENCOUNTER SYMPTOMS
APNEA: 0
ALLERGIC/IMMUNOLOGIC NEGATIVE: 1
COUGH: 0
BACK PAIN: 0
SHORTNESS OF BREATH: 0
STRIDOR: 0
CHOKING: 0
ABDOMINAL DISTENTION: 0
SINUS PRESSURE: 0
VOICE CHANGE: 0
SORE THROAT: 0
EYE DISCHARGE: 0
COLOR CHANGE: 0
PHOTOPHOBIA: 0
BLOOD IN STOOL: 0
VOMITING: 0
FACIAL SWELLING: 0
EYE PAIN: 0
ABDOMINAL PAIN: 0
CHEST TIGHTNESS: 0
NAUSEA: 0
TROUBLE SWALLOWING: 0
ANAL BLEEDING: 0
EYE REDNESS: 0
CONSTIPATION: 0
DIARRHEA: 0
EYE ITCHING: 0
WHEEZING: 0
RHINORRHEA: 0
RECTAL PAIN: 0

## 2021-05-15 NOTE — PROGRESS NOTES
Elis Putnam (:  1975)     ASSESSMENT:  1.  Morbid obesity (BMI 50)   2. Hypertension  3. Hyperlipidemia  4. Diabetes mellitus  5. Depression  6. Chronic lower back pain  7. GERD    PLAN:  1. Discussion again about the pros and cons of weight loss surgery. The risks benefits and alternatives to laparoscopic adjustable band, gastric sleeve and gastric Zain-en-Y bypass were discussed in detail. The pros and cons of robotic assisted, laparoscopic and open techniques were discussed. 2.  Behavior modification discussed again in regards to dietary habits. 3.  Nutritional education occurred during visit. Follow up  with dietitian for further evaluation and continue to follow recommendations as directed. 4.  Options for medical management of morbid obesity discussed. 5.  Improvement in fitness/exercise discussed with patient and the need for this with/without surgery. 6.  Medical necessity letter from PCP. 7.  Follow-up in one month at weight management program at [facility]. 8.  Signs and symptoms reviewed with patient that would be concerning and need her to return to office for re-evaluation. Patient states she will call if she has questions or concerns. 9. Multivitamin  10. Psychology dilation completed. Follow-up as needed. 11. EGD completed. Results reviewed. H. pylori negative. 12.  Encouraged support groups  13. Send LOMN    Patient states that she has not been able to lose enough adequate excess body weight with medical management only and would like to proceed with a robotic sleeve gastrectomy for further weight loss. More than 15 minutes spent with patient today. Greater than 50% of the time was involved counseling, educaton and coordinating care.       SUBJECTIVE/OBJECTIVE:    Chief Complaint   Patient presents with    Follow-up     month 5 of 3 desires sleeve      HPI  Luz Maria Sanders is a 62-year-old female presents for follow-up in the weight management program secondary to her morbid obesity. Current weight 307 pounds. She has lost about 12 pounds in the program since starting. BMI 50. Taking multivitamin. Taken vitamin D3. Completed upper endoscopy. No Thompson's. H. pylori negative. Completed psychology evaluation. Attended support groups. Following with the dietitian. Continuing to work on portion control and food selection. Staying well-hydrated. Trying to improve with exercise/fitness. Doing more walking with her spouse and also yoga. Denies current chest or abdominal pain. No hematochezia or melena. No new urinary complaints. She admits she has not been able to lose enough adequate excess body weight with medical management only and is wishing to proceed with a sleeve gastrectomy. She states she is excited to proceed with surgery so as to continue to work towards her weight loss goals. Review of Systems   Constitutional: Negative for activity change, appetite change, chills, diaphoresis, fatigue, fever and unexpected weight change. HENT: Negative for congestion, dental problem, drooling, ear discharge, ear pain, facial swelling, hearing loss, mouth sores, nosebleeds, postnasal drip, rhinorrhea, sinus pressure, sneezing, sore throat, tinnitus, trouble swallowing and voice change. Eyes: Negative for photophobia, pain, discharge, redness, itching and visual disturbance. Respiratory: Negative for apnea, cough, choking, chest tightness, shortness of breath, wheezing and stridor. Cardiovascular: Negative for chest pain, palpitations and leg swelling. Gastrointestinal: Negative for abdominal distention, abdominal pain, anal bleeding, blood in stool, constipation, diarrhea, nausea, rectal pain and vomiting. Endocrine: Negative.     Genitourinary: Negative for decreased urine volume, difficulty urinating, dyspareunia, dysuria, enuresis, flank pain, frequency, genital sores, hematuria, menstrual problem, pelvic pain, urgency, vaginal bleeding, vaginal discharge and vaginal pain. Musculoskeletal: Negative for arthralgias, back pain, gait problem, joint swelling, myalgias, neck pain and neck stiffness. Skin: Negative for color change, pallor, rash and wound. Allergic/Immunologic: Negative. Neurological: Negative for dizziness, tremors, seizures, syncope, facial asymmetry, speech difficulty, weakness, light-headedness, numbness and headaches. Hematological: Negative for adenopathy. Does not bruise/bleed easily. Psychiatric/Behavioral: Negative for agitation, behavioral problems, confusion, decreased concentration, dysphoric mood, hallucinations, self-injury, sleep disturbance and suicidal ideas. The patient is not nervous/anxious and is not hyperactive.         Past Medical History:   Diagnosis Date    Back pain     Depression     Diabetes mellitus (HCC)     GERD (gastroesophageal reflux disease)     Hypercholesteremia     Hypertension     PONV (postoperative nausea and vomiting)     UTI (urinary tract infection)        Past Surgical History:   Procedure Laterality Date    CHOLECYSTECTOMY      ENDOMETRIAL ABLATION      HYSTERECTOMY ABDOMINAL N/A 12/12/2017    ROBOTIC TOTAL HYSTERECTOMY WITH BILATERAL SALPINGECTOMY performed by Harvey Ross MD at [address] [place] [address]      vocal cord mass removed    OVARIAN CYST REMOVAL      UPPER GASTROINTESTINAL ENDOSCOPY N/A 2/9/2021    EGD BIOPSY performed by Jeferson Paredes MD at [facility]       Current Outpatient Medications   Medication Sig Dispense Refill    FLUoxetine (PROZAC) 20 MG capsule Take 3 capsules by mouth daily 360 capsule 3    vitamin D 25 MCG (1000 UT) CAPS Take 1 capsule by mouth daily       dapagliflozin (FARXIGA) 5 MG tablet Take 1 tablet by mouth every morning 90 tablet 1    atorvastatin (LIPITOR) 20 MG tablet Take 1 tablet by mouth daily 90 tablet 1    Multiple Vitamins-Minerals (MULTIVITAMIN ADULT PO) Take 1 tablet by mouth daily  Frequency of Communication with Friends and Family:     Frequency of Social Gatherings with Friends and Family:     Attends Adventism Services:     Active Member of Clubs or Organizations:     Attends Club or Organization Meetings:     Marital Status:    Intimate Partner Violence:     Fear of Current or Ex-Partner:     Emotionally Abused:     Physically Abused:     Sexually Abused:      Vitals:    05/14/21 1103   BP: 124/76   Site: Right Upper Arm   Position: Sitting   Cuff Size: Large Adult   Pulse: 66   Resp: 18   Temp: 97.3 °F (36.3 °C)   TempSrc: Infrared   Weight: (!) 307 lb 3.2 oz (139.3 kg)     Body mass index is 50.73 kg/m². Wt Readings from Last 3 Encounters:   05/14/21 (!) 307 lb 3.2 oz (139.3 kg)   02/22/21 (!) 310 lb 6.4 oz (140.8 kg)   02/09/21 (!) 313 lb (142 kg)     Physical Exam  Vitals reviewed. Constitutional:       General: She is not in acute distress. Appearance: She is well-developed. She is not diaphoretic. HENT:      Head: Normocephalic and atraumatic. Right Ear: External ear normal.      Left Ear: External ear normal.      Nose: Nose normal.   Eyes:      General: No scleral icterus. Right eye: No discharge. Left eye: No discharge. Conjunctiva/sclera: Conjunctivae normal.   Cardiovascular:      Rate and Rhythm: Normal rate and regular rhythm. Heart sounds: Normal heart sounds. Pulmonary:      Effort: Pulmonary effort is normal. No respiratory distress. Breath sounds: Normal breath sounds. No wheezing or rales. Chest:      Chest wall: No tenderness. Abdominal:      General: Bowel sounds are normal. There is no distension. Palpations: Abdomen is soft. There is no mass. Tenderness: There is no abdominal tenderness. There is no guarding or rebound. Musculoskeletal:         General: No tenderness. Normal range of motion. Cervical back: Normal range of motion and neck supple. Skin:     General: Skin is warm and dry. 2020    AVGG 132 2020      TSH   Lab Results   Component Value Date    TSH 2.570 2020      IRON   Lab Results   Component Value Date    IRON 95 2020      TIBC  Lab Results   Component Value Date    TIBC 304 2020     FERRITIN  Lab Results   Component Value Date    FERRITIN 111 2020     VITAMIN A  Lab Results   Component Value Date    RETINOL SEE BELOW 2020     NICOTINE  No results found for: NMET  UDS  Lab Results   Component Value Date    UDP SEE BELOW 2020     PSA  No results found for: LABPSA  GFR  Lab Results   Component Value Date    LABGLOM >90 2020     DEXA  No results found for this or any previous visit. Imaging -     OPERATIVE REPORT     PATIENT NAME: Anitra De León                    :        1975  MED REC NO:   [id number]                           ROOM:  ACCOUNT NO:   [de-identified]                           ADMIT DATE: 2021  PROVIDER:     Adrianna Pradhan M.D.     DATE OF PROCEDURE:  2021     PROCEDURES:  EGD plus biopsy.     SURGEON:  Adrianna Pradhan M.D.     INDICATION FOR PROCEDURE:  The patient is here for pre gastric sleeve  clearance. See the consult from the office and preop note for rest of  clinicals.     ASA CLASSIFICATION:  II.     MEDICATION:  As per Anesthesia.     BIOPSY:  Yes.     PHOTOGRAPH:  Yes.     DESCRIPTION OF THE PROCEDURE:  Informed consent was obtained after  explaining risks and benefits of the procedure and conscious sedation. Possible complications including bleeding, perforation, reaction to  medicine, but not limiting to death, were discussed.     Afterwards, GIF-180 gastroscope was advanced through oropharynx,  esophagus, stomach into the duodenum. Mucosa looks very healthy. The  patient has family history of celiac but there is no endoscopic  suspicion for it. No biopsies done. Scope was withdrawn. Gastritis  biopsies were taken to check for HP. Retroflex exam showed gastritis in  the body. Fundus and cardia were relatively normal.  Small hiatal  hernia, on top of that,  minimal esophagitis distal, irregular GE  junction. Using NBI light, close examination was done and multiple  biopsies were taken to check for Sawant's. The patient tolerated the  procedure well.     IMPRESSION:  1. Mild GERD with possible Sawant's.  2.  Gastritis. We will check for HP.     RECOMMENDATIONS:  The patient is cleared for gastric sleeve surgery. We  will have an office visit to discuss biopsy and make a long-term plan as  to how to manage GERD. We will set it up in couple of months.     BLOOD LOSS:  None.     Destinee Bauer M.D.     D: 02/09/2021 13:40:00       T: 02/09/2021 16:52:30       PATHOLOGY REPORT                       ATTN: Destinee Bauer                       REQ: Destinee Bauer     Copies To:   BABITA CARLSON     Clinical Information: PRE BARIATRIC     FINAL DIAGNOSIS:   A: Stomach, biopsies:    Reactive/chemical gastropathy.    Negative for H. pylori organisms. B: Esophagus, biopsies:    Chronic reflux esophagitis and chronic carditis.    Negative for intestinal metaplasia and dysplasia. Specimen:   A) STOMACH BIOPSY, GASTRIC, GASTRITIS   B) BIOPSY OF ESOPHAGUS, CHECK FOR SAWANT'S     Gross Examination:   A - The container is labeled William Rudolph, stomach, gastritis. Received in formalin are two bits of tan tissue varying in size from 2   mm up to 4 mm.  1 ns. B - The container is labeled William Rudolph, esophagus, Sawant's. Received in formalin are two 2 mm bits of tan tissue.  1 ns.    MTK/DKR:[id number]     Microscopic Examination:   A: The specimen consists of bits of gastric antral mucosa.  There is   mucosal distortion with smooth muscle in the lamina propria accompanied   by corkscrewing of the gastric pits.  There is evidence of mucosal   repair.  Significant chronic and active inflammation is not identified.    In addition, there is no evidence of intestinal metaplasia, dysplasia, or neoplasia.  H. pylori organisms are also not seen. B: The specimen consists of fragments of the squamocolumnar junction. The squamous mucosa demonstrates findings consistent with chronic   reflux esophagitis.  The glandular mucosa consists of gastric foveolar   type epithelium. Lenetta Cave is a chronic inflammatory infiltrate within the   glandular mucosa.  There is no evidence of intestinal metaplasia or   dysplasia.                                            Kaur Sykes M.D., F.C.A.P. Patient Active Problem List   Diagnosis    Type 2 diabetes mellitus without complication, without long-term current use of insulin ([facility] [address]    Essential hypertension    Morbid obesity with BMI of 50.0-59.9, adult ([facility] [address] [facility]    Recurrent major depressive disorder, in full remission [facility])     An electronic signature was used to authenticate this note.     --Rocio Armando MD

## 2021-05-17 DIAGNOSIS — E11.9 TYPE 2 DIABETES MELLITUS WITHOUT COMPLICATION, WITHOUT LONG-TERM CURRENT USE OF INSULIN (HCC): ICD-10-CM

## 2021-05-17 RX ORDER — DAPAGLIFLOZIN 5 MG/1
TABLET, FILM COATED ORAL
Qty: 90 TABLET | Refills: 1 | Status: ON HOLD | OUTPATIENT
Start: 2021-05-17 | End: 2021-10-13 | Stop reason: HOSPADM

## 2021-05-17 RX ORDER — ATORVASTATIN CALCIUM 20 MG/1
TABLET, FILM COATED ORAL
Qty: 90 TABLET | Refills: 1 | Status: SHIPPED | OUTPATIENT
Start: 2021-05-17 | End: 2022-01-26

## 2021-05-17 NOTE — TELEPHONE ENCOUNTER
Thank you for the quick response! Will sign off at this time.      Lamine Levine, PharmD, [facility]  Direct: [phone]  Department, toll free [phone], option 7

## 2021-05-17 NOTE — TELEPHONE ENCOUNTER
[employer] and supplies accepted in separate encounter. Will sign off at this time.      Marko Ch, PharmD, [facility]  Direct: [phone]  Department, toll free [phone], option [address] [facility] [place] [facility] [place] in place:  No   Recommendation Provided To: Provider: 1 via Note to Provider   Intervention Detail: New Rx: 1, reason: Cost/Formulary Change   Gap Closed?: Yes    Total # of Interventions Recommended: 1   Total # of Interventions Accepted: 1   Intervention Accepted By: Provider: 1   Time Spent (min): 45

## 2021-05-24 ENCOUNTER — TELEPHONE (OUTPATIENT)
Dept: BARIATRICS/WEIGHT MGMT | Age: 46
End: 2021-05-24

## 2021-05-24 NOTE — TELEPHONE ENCOUNTER
Called patient to inform would like pt to be seen in office 1-2 times prior to scheduled pre op teaching class in August to make sure following guidelines in relation to upcoming bariatric surgery and have weight checks in office. Pt was unable to get that scheduled on phone as did not have work schedule in front of her and was taking daughter to an appointment. Have asked pt to contact RD via My Chart with some dates in June that is available to set up appointment with RD. Pt voiced understanding.

## 2021-06-07 NOTE — PROGRESS NOTES
for lunch  Cooks dinners at homes, sometimes eats out. Snacks:  using almonds as a snack. Main Beverages: has cut out pop, Tazo Brand Unsweet Passion or Mint caffeine free  Tea ,   100 oz water daily - some Powerade or Gatorade Zero   -Impression of Dietary Intake: on average, 3 meals per day. - Pt  is working towards  avoiding high fat/high sugar foods. Pt  is working towards  including protein at meals and snacks. Exercise:  Patient has not attended Fitness Orientation-   Pt getting steps in on days  working ~ 8,000-9,000 steps per day. Continues to do some exercise videos at home  -  Nutrition Diagnosis: Overweight/Obesity related to currently undergoing MNT as evidenced by BMI of 51  Intervention:  Patient has  attended support groups online times two. Pt has completed three months of medically supervised weight loss. Pt ready from nutrition standpoint to proceed with bariatric surgery. Reviewed out of pocket cost for bariatric vitamins and protein powder. Samples given to pt today. Also reviewed pre op diet to start August 9th with anticipated surgery date of August 23rd. Questions answered. Patient Instructions   Goals:  1. Remember you will need to separate you liquids from solids after surgery. No liquids 30 minutes before your meals and no liquids 30 minutes after your meals. 2. You will need to take your time with meals after surgery and begin practicing this now - chew foods really well and take 20-30 minutes at each meal.  3.  Aim for consistent and set meal times- choose lean protein foods first, followed by vegetables and fruit, then starch food last if still hungry. Consistency is key following bariatric surgery. 4.  Continue regular physical activity- recommend stay as active as you can leading up to surgery and after surgery this will remain important for your weight loss efforts  5. Start Pre Op Diet Monday August 9th (see paper given to you today).   Make sure discuss with family doctor if should stop [place] when start pre op diet as will be very low carbohydrate diet and do not want to have low blood sugar.    -Followup visit: pre op teaching class August 16th      Ellie Smiley RD, MISA, RD, LD  Dietitian- [facility]  [address] [address]

## 2021-06-08 ENCOUNTER — OFFICE VISIT (OUTPATIENT)
Dept: BARIATRICS/WEIGHT MGMT | Age: 46
End: 2021-06-08

## 2021-06-08 VITALS — BODY MASS INDEX: 48.82 KG/M2 | WEIGHT: 293 LBS | HEIGHT: 65 IN | TEMPERATURE: 97.2 F

## 2021-06-08 DIAGNOSIS — E66.01 MORBID OBESITY WITH BMI OF 50.0-59.9, ADULT (HCC): Primary | ICD-10-CM

## 2021-06-08 NOTE — PATIENT INSTRUCTIONS
Goals: 1. Remember you will need to separate you liquids from solids after surgery. No liquids 30 minutes before your meals and no liquids 30 minutes after your meals. 2. You will need to take your time with meals after surgery and begin practicing this now - chew foods really well and take 20-30 minutes at each meal.  3.  Aim for consistent and set meal times- choose lean protein foods first, followed by vegetables and fruit, then starch food last if still hungry. Consistency is key following bariatric surgery. 4.  Continue regular physical activity- recommend stay as active as you can leading up to surgery and after surgery this will remain important for your weight loss efforts  5. Start Pre Op Diet Monday August 9th (see paper given to you today). Make sure discuss with family doctor if should stop [place] when start pre op diet as will be very low carbohydrate diet and do not want to have low blood sugar.

## 2021-06-10 ENCOUNTER — TELEPHONE (OUTPATIENT)
Dept: PHARMACY | Facility: CLINIC | Age: 46
End: 2021-06-10

## 2021-06-10 NOTE — TELEPHONE ENCOUNTER
Pharmacy Pop Care Documentation:   Called patient with reminder for requirements for Diabetes Management Program.     According to our records, patient is missing the following requirement(s) that must be completed by July 1st 2021:   · 1st 2021 A1C      Patient not available at the time of call. Left message on home/cell TAD with the above information. [employer] message sent.      Mely Lehman, [employer]   Department, toll free: [phone], option 7

## 2021-06-21 ENCOUNTER — PATIENT MESSAGE (OUTPATIENT)
Dept: FAMILY MEDICINE CLINIC | Age: 46
End: 2021-06-21

## 2021-06-21 DIAGNOSIS — E11.9 TYPE 2 DIABETES MELLITUS WITHOUT COMPLICATION, WITHOUT LONG-TERM CURRENT USE OF INSULIN (HCC): Primary | ICD-10-CM

## 2021-06-22 ENCOUNTER — PATIENT MESSAGE (OUTPATIENT)
Dept: BARIATRICS/WEIGHT MGMT | Age: 46
End: 2021-06-22

## 2021-06-22 NOTE — TELEPHONE ENCOUNTER
From: Jackelyn Cloud  To: THADDEUS Fields - CNP  Sent: 6/21/2021 7:05 PM EDT  Subject: Visit Follow-Up Question    Hi there! I'm enrolled in the [facility] Be Well/ Diabetes program and I need to get an A1c done. Are you able to order that?

## 2021-06-30 ENCOUNTER — HOSPITAL ENCOUNTER (OUTPATIENT)
Age: 46
Discharge: HOME | End: 2021-06-30
Payer: COMMERCIAL

## 2021-06-30 DIAGNOSIS — Z01.818 PRE-OP TESTING: ICD-10-CM

## 2021-06-30 DIAGNOSIS — E11.9 TYPE 2 DIABETES MELLITUS WITHOUT COMPLICATION, WITHOUT LONG-TERM CURRENT USE OF INSULIN (HCC): ICD-10-CM

## 2021-06-30 LAB
ANION GAP SERPL CALCULATED.3IONS-SCNC: 11 MEQ/L (ref 8–16)
AVERAGE GLUCOSE: 162 MG/DL (ref 70–126)
BASOPHILS # BLD: 0.4 %
BASOPHILS ABSOLUTE: 0 THOU/MM3 (ref 0–0.1)
BUN BLDV-MCNC: 17 MG/DL (ref 7–22)
CALCIUM SERPL-MCNC: 9.3 MG/DL (ref 8.5–10.5)
CHLORIDE BLD-SCNC: 100 MEQ/L (ref 98–111)
CO2: 26 MEQ/L (ref 23–33)
CREAT SERPL-MCNC: 0.6 MG/DL (ref 0.4–1.2)
EOSINOPHIL # BLD: 1.4 %
EOSINOPHILS ABSOLUTE: 0.2 THOU/MM3 (ref 0–0.4)
ERYTHROCYTE [DISTWIDTH] IN BLOOD BY AUTOMATED COUNT: 12.3 % (ref 11.5–14.5)
ERYTHROCYTE [DISTWIDTH] IN BLOOD BY AUTOMATED COUNT: 41.3 FL (ref 35–45)
GFR SERPL CREATININE-BSD FRML MDRD: > 90 ML/MIN/1.73M2
GLUCOSE BLD-MCNC: 186 MG/DL (ref 70–108)
HBA1C MFR BLD: 7.4 % (ref 4.4–6.4)
HCT VFR BLD CALC: 43.8 % (ref 37–47)
HEMOGLOBIN: 14.4 GM/DL (ref 12–16)
IMMATURE GRANS (ABS): 0.05 THOU/MM3 (ref 0–0.07)
IMMATURE GRANULOCYTES: 0.4 %
LYMPHOCYTES # BLD: 36.8 %
LYMPHOCYTES ABSOLUTE: 4.1 THOU/MM3 (ref 1–4.8)
MCH RBC QN AUTO: 30.3 PG (ref 26–33)
MCHC RBC AUTO-ENTMCNC: 32.9 GM/DL (ref 32.2–35.5)
MCV RBC AUTO: 92 FL (ref 81–99)
MONOCYTES # BLD: 5.8 %
MONOCYTES ABSOLUTE: 0.6 THOU/MM3 (ref 0.4–1.3)
NUCLEATED RED BLOOD CELLS: 0 /100 WBC
PLATELET # BLD: 289 THOU/MM3 (ref 130–400)
PMV BLD AUTO: 11.4 FL (ref 9.4–12.4)
POTASSIUM SERPL-SCNC: 4 MEQ/L (ref 3.5–5.2)
RBC # BLD: 4.76 MILL/MM3 (ref 4.2–5.4)
SEG NEUTROPHILS: 55.2 %
SEGMENTED NEUTROPHILS ABSOLUTE COUNT: 6.2 THOU/MM3 (ref 1.8–7.7)
SODIUM BLD-SCNC: 137 MEQ/L (ref 135–145)
WBC # BLD: 11.2 THOU/MM3 (ref 4.8–10.8)

## 2021-06-30 PROCEDURE — 36415 COLL VENOUS BLD VENIPUNCTURE: CPT

## 2021-06-30 PROCEDURE — 80048 BASIC METABOLIC PNL TOTAL CA: CPT

## 2021-06-30 PROCEDURE — 83036 HEMOGLOBIN GLYCOSYLATED A1C: CPT

## 2021-06-30 PROCEDURE — 85025 COMPLETE CBC W/AUTO DIFF WBC: CPT

## 2021-07-07 ENCOUNTER — TELEPHONE (OUTPATIENT)
Dept: BARIATRICS/WEIGHT MGMT | Age: 46
End: 2021-07-07

## 2021-07-07 NOTE — TELEPHONE ENCOUNTER
MM rep \"Adeline\" has requested medical clearance for diabetes and HTN. Our request for surgery has been denied until that clearance is received. We have 14 days to request a peer to peer [phone] that does not happen in that time period-we have to fax our appeal for this denial.  Message left for Arnold Costello to contact PCP and ask for a statement medical clearance that diabetes is controlled and HTN is well controlled on current medication. A1C is 7.4 at this time.

## 2021-07-14 ENCOUNTER — OFFICE VISIT (OUTPATIENT)
Dept: FAMILY MEDICINE CLINIC | Age: 46
End: 2021-07-14
Payer: COMMERCIAL

## 2021-07-14 VITALS
RESPIRATION RATE: 12 BRPM | WEIGHT: 293 LBS | HEART RATE: 76 BPM | DIASTOLIC BLOOD PRESSURE: 70 MMHG | HEIGHT: 66 IN | BODY MASS INDEX: 47.09 KG/M2 | SYSTOLIC BLOOD PRESSURE: 132 MMHG

## 2021-07-14 DIAGNOSIS — F33.42 RECURRENT MAJOR DEPRESSIVE DISORDER, IN FULL REMISSION (HCC): ICD-10-CM

## 2021-07-14 DIAGNOSIS — Z00.00 WELL ADULT EXAM: Primary | ICD-10-CM

## 2021-07-14 DIAGNOSIS — E66.01 MORBID OBESITY WITH BMI OF 50.0-59.9, ADULT (HCC): ICD-10-CM

## 2021-07-14 DIAGNOSIS — E11.9 TYPE 2 DIABETES MELLITUS WITHOUT COMPLICATION, WITHOUT LONG-TERM CURRENT USE OF INSULIN (HCC): ICD-10-CM

## 2021-07-14 PROCEDURE — 99396 PREV VISIT EST AGE 40-64: CPT | Performed by: NURSE PRACTITIONER

## 2021-07-14 SDOH — ECONOMIC STABILITY: FOOD INSECURITY: WITHIN THE PAST 12 MONTHS, YOU WORRIED THAT YOUR FOOD WOULD RUN OUT BEFORE YOU GOT MONEY TO BUY MORE.: NEVER TRUE

## 2021-07-14 SDOH — ECONOMIC STABILITY: FOOD INSECURITY: WITHIN THE PAST 12 MONTHS, THE FOOD YOU BOUGHT JUST DIDN'T LAST AND YOU DIDN'T HAVE MONEY TO GET MORE.: NEVER TRUE

## 2021-07-14 ASSESSMENT — SOCIAL DETERMINANTS OF HEALTH (SDOH): HOW HARD IS IT FOR YOU TO PAY FOR THE VERY BASICS LIKE FOOD, HOUSING, MEDICAL CARE, AND HEATING?: NOT HARD AT ALL

## 2021-07-14 ASSESSMENT — ENCOUNTER SYMPTOMS
NAUSEA: 0
SHORTNESS OF BREATH: 0
ABDOMINAL PAIN: 0
COUGH: 0

## 2021-07-14 NOTE — PROGRESS NOTES
Chronic Disease Visit Information    BP Readings from Last 3 Encounters:   05/14/21 124/76   02/22/21 100/70   02/09/21 (!) 93/50          Hemoglobin A1C (%)   Date Value   06/30/2021 7.4 (H)   11/14/2020 6.4   01/12/2019 7.2 (H)     Microalbumin, Random Urine (mg/dL)   Date Value   01/12/2019 1.49     LDL Calculated (mg/dL)   Date Value   11/14/2020 143     HDL (mg/dL)   Date Value   11/14/2020 42     BUN (mg/dL)   Date Value   06/30/2021 17     CREATININE (mg/dL)   Date Value   06/30/2021 0.6     Glucose (mg/dL)   Date Value   06/30/2021 186 (H)            Have you changed or started any medications since your last visit including any over-the-counter medicines, vitamins, or herbal medicines? no   Are you having any side effects from any of your medications? -  no  Have you stopped taking any of your medications? Is so, why? -  no    Have you seen any other physician or provider since your last visit? Yes - Records Obtained  Have you had any other diagnostic tests since your last visit? Yes - Records Obtained  Have you been seen in the emergency room and/or had an admission to a hospital since we last saw you? No  Have you had your annual diabetic retinal (eye) exam? No  Have you had your routine dental cleaning in the past 6 months? yes -     Have you activated your [employer] information [employer] account? If not, what are your barriers?  Yes     Patient Care Team:  THADDEUS Feliciano CNP as PCP - General (Family Nurse Practitioner)  THADDEUS Feliciano CNP as PCP - [facility]         Medical History Review  Past Medical, Family, and Social History reviewed and does contribute to the patient presenting condition    Health Maintenance   Topic Date Due    Hepatitis C screen  Never done    Diabetic foot exam  Never done    Diabetic retinal exam  Never done    COVID-19 Vaccine (1) Never done    HIV screen  Never done    Hepatitis B vaccine (1 of 3 - Risk 3-dose series) Never done    Cervical cancer screen  Never done    Diabetic microalbuminuria test  01/12/2020    DTaP/Tdap/Td vaccine (2 - Td or Tdap) 03/07/2020    Flu vaccine (1) 09/01/2021    Lipid screen  11/14/2021    A1C test (Diabetic or Prediabetic)  06/30/2022    Potassium monitoring  06/30/2022    Creatinine monitoring  06/30/2022    Pneumococcal 0-64 years Vaccine (2 of 2 - PPSV23) 05/25/2042    Hepatitis A vaccine  Aged Out    Hib vaccine  Aged Out    Meningococcal (ACWY) vaccine  Aged Out

## 2021-07-14 NOTE — TELEPHONE ENCOUNTER
Patient seen in office today. Patient's HTN and diabetes are controlled and cleared for surgery. Is there a form you need me to complete to get this approved for insurance?

## 2021-07-14 NOTE — PROGRESS NOTES
Subjective:      Patient ID: Breezy Ashley is a 42 y.o. female. HPI: Annual Exam    Chief Complaint   Patient presents with    Annual Exam    Results       Patient Active Problem List   Diagnosis    Type 2 diabetes mellitus without complication, without long-term current use of insulin ([facility]    Essential hypertension    Morbid obesity with BMI of 50.0-59.9, adult ([facility]    Recurrent major depressive disorder, in full remission ([facility]     Weight Management - [facility] - Dr. Jerson Nicole    Denies CP, SOB or chest tightness    [facility] and will be getting the Gastric Sleeve in the coming months [place]     Wt Readings from Last 3 Encounters:   07/14/21 (!) 311 lb (141.1 kg)   06/08/21 (!) 310 lb 9.6 oz (140.9 kg)   05/14/21 (!) 307 lb 3.2 oz (139.3 kg)       Mood stable. Prozac 20 mg    On Prinzide 20-12.5 mg Daily. BP Readings from Last 3 Encounters:   07/14/21 132/70   05/14/21 124/76   02/22/21 100/70       DM. On Farxiga 5 mg. Intolerance to Metformin.      Lab Results   Component Value Date    LABA1C 7.4 (H) 06/30/2021    LABA1C 6.4 11/14/2020    LABA1C 7.2 (H) 01/12/2019     No results found for: EAG    No components found for: CHLPL  Lab Results   Component Value Date    TRIG 274 (H) 11/14/2020    TRIG 354 (H) 01/12/2019    TRIG 310 (H) 08/11/2017     Lab Results   Component Value Date    HDL 42 11/14/2020    HDL 45 01/12/2019    HDL 40 08/11/2017     Lab Results   Component Value Date    LDLCALC 143 11/14/2020    LDLCALC 137 01/12/2019    LDLCALC 128 08/11/2017     No results found for: LABVLDL      Chemistry        Component Value Date/Time     06/30/2021 0620    K 4.0 06/30/2021 0620     06/30/2021 0620    CO2 26 06/30/2021 0620    BUN 17 06/30/2021 0620    CREATININE 0.6 06/30/2021 0620        Component Value Date/Time    CALCIUM 9.3 06/30/2021 0620    ALKPHOS 76 11/14/2020 0729    AST 27 11/14/2020 0729    ALT 49 11/14/2020 0729    BILITOT 0.6 11/14/2020 4636            Lab Results   Component Value Date    TSH 2.570 11/14/2020       Lab Results   Component Value Date    WBC 11.2 (H) 06/30/2021    HGB 14.4 06/30/2021    HCT 43.8 06/30/2021    MCV 92.0 06/30/2021     06/30/2021         Health Maintenance   Topic Date Due    Hepatitis C screen  Never done    Diabetic retinal exam  Never done    COVID-19 Vaccine (1) Never done    HIV screen  Never done    Hepatitis B vaccine (1 of 3 - Risk 3-dose series) Never done    Cervical cancer screen  Never done    Diabetic microalbuminuria test  01/12/2020    DTaP/Tdap/Td vaccine (2 - Td or Tdap) 03/07/2020    Flu vaccine (1) 09/01/2021    Lipid screen  11/14/2021    A1C test (Diabetic or Prediabetic)  06/30/2022    Potassium monitoring  06/30/2022    Creatinine monitoring  06/30/2022    Diabetic foot exam  07/14/2022    Pneumococcal 0-64 years Vaccine (2 of 2 - PPSV23) 05/25/2042    Hepatitis A vaccine  Aged Out    Hib vaccine  Aged Out    Meningococcal (ACWY) vaccine  Aged [employer] History   Administered Date(s) Administered    Hepatitis A Adult (Havrix, Vaqta) 06/20/2019    Influenza Virus Vaccine 10/15/2020    Pneumococcal Polysaccharide ([id number]) 08/01/2017    Tdap (Boostrix, Adacel) 03/07/2010           Review of Systems   Constitutional: Negative for chills and fever. HENT: Negative. Respiratory: Negative for cough and shortness of breath. Cardiovascular: Negative for chest pain. Gastrointestinal: Negative for abdominal pain and nausea. Skin: Negative for rash. Neurological: Negative for dizziness, light-headedness and headaches. Psychiatric/Behavioral: Negative. Objective:   Physical Exam  Constitutional:       General: She is not in acute distress. Eyes:      Pupils: Pupils are equal, round, and reactive to light. Cardiovascular:      Rate and Rhythm: Normal rate and regular rhythm. Heart sounds: No murmur heard.      Pulmonary:      Effort:

## 2021-07-14 NOTE — PATIENT INSTRUCTIONS
You may receive a survey about your visit with us today. The feedback from our patients helps us identify what is working well and where the service to all patients can be enhanced. Thank you! Patient Education        Learning About Meal Planning for Diabetes  Why plan your meals? Meal planning can be a key part of managing diabetes. Planning meals and snacks with the right balance of carbohydrate, protein, and fat can help you keep your blood sugar at the target level you set with your doctor. You don't have to eat special foods. You can eat what your family eats, including sweets once in a while. But you do have to pay attention to how often you eat and how much you eat of certain foods. You may want to work with a dietitian or a certified diabetes educator. He or she can give you tips and meal ideas and can answer your questions about meal planning. This health professional can also help you reach a healthy weight if that is one of your goals. What plan is right for you? Your dietitian or diabetes educator may suggest that you start with the plate format or carbohydrate counting. The plate format  The plate format is a simple way to help you manage how you eat. You plan meals by learning how much space each food should take on a plate. Using the plate format helps you spread carbohydrate throughout the day. It can make it easier to keep your blood sugar level within your target range. It also helps you see if you're eating healthy portion sizes. To use the plate format, you put non-starchy vegetables on half your plate. Add meat or meat substitutes on one-quarter of the plate. Put a grain or starchy vegetable (such as brown rice or a potato) on the final quarter of the plate.  You can add a small piece of fruit and some low-fat or fat-free milk or yogurt, depending on your carbohydrate goal for each meal.  Here are some tips for using the plate format:  · Make sure that you are not using an oversized plate. A 9-inch plate is best. Many restaurants use larger plates. · Get used to using the plate format at home. Then you can use it when you eat out. · Write down your questions about using the plate format. Talk to your doctor, a dietitian, or a diabetes educator about your concerns. Carbohydrate counting  With carbohydrate counting, you plan meals based on the amount of carbohydrate in each food. Carbohydrate raises blood sugar higher and more quickly than any other nutrient. It is found in desserts, breads and cereals, and fruit. It's also found in starchy vegetables such as potatoes and corn, grains such as rice and pasta, and milk and yogurt. Spreading carbohydrate throughout the day helps keep your blood sugar levels within your target range. Your daily amount depends on several things, including your weight, how active you are, which diabetes medicines you take, and what your goals are for your blood sugar levels. A registered dietitian or diabetes educator can help you plan how much carbohydrate to include in each meal and snack. A guideline for your daily amount of carbohydrate is:  · 45 to 60 grams at each meal. That's about the same as 3 to 4 carbohydrate servings. · 15 to 20 grams at each snack. That's about the same as 1 carbohydrate serving. The Nutrition Facts label on packaged foods tells you how much carbohydrate is in a serving of the food. First, look at the serving size on the food label. Is that the amount you eat in a serving? All of the nutrition information on a food label is based on that serving size. So if you eat more or less than that, you'll need to adjust the other numbers. Total carbohydrate is the next thing you need to look for on the label. If you count carbohydrate servings, one serving of carbohydrate is 15 grams. For foods that don't come with labels, such as fresh fruits and vegetables, you'll need a guide that lists carbohydrate in these foods.  Ask your doctor, dietitian, or diabetes educator about books or other nutrition guides you can use. If you take insulin, you need to know how many grams of carbohydrate are in a meal. This lets you know how much rapid-acting insulin to take before you eat. If you use an insulin pump, you get a constant rate of insulin during the day. So the pump must be programmed at meals to give you extra insulin to cover the rise in blood sugar after meals. When you know how much carbohydrate you will eat, you can take the right amount of insulin. Or, if you always use the same amount of insulin, you need to make sure that you eat the same amount of carbohydrate at meals. If you need more help to understand carbohydrate counting and food labels, ask your doctor, dietitian, or diabetes educator. How can you plan healthy meals? Here are some tips to get started:  · Plan your meals a week at a time. Don't forget to include snacks too. · Use cookbooks or online recipes to plan several main meals. Plan some quick meals for busy nights. You also can double some recipes that freeze well. Then you can save half for other busy nights when you don't have time to cook. · Make sure you have the ingredients you need for your recipes. If you're running low on basic items, put these items on your shopping list too. · List foods that you use to make breakfasts, lunches, and snacks. List plenty of fruits and vegetables. · Post this list on the refrigerator. Add to it as you think of more things you need. · Take the list to the store to do your weekly shopping. Follow-up care is a key part of your treatment and safety. Be sure to make and go to all appointments, and call your doctor if you are having problems. It's also a good idea to know your test results and keep a list of the medicines you take. Where can you learn more? Go to https://lizzy.[employer]Whitenoise Networks. org and sign in to your Zhaogang account.  Enter D492 in the [address] Information box to learn more about \"Learning About Meal Planning for Diabetes. \"     If you do not have an account, please click on the \"Sign Up Now\" link. Current as of: August 31, 2020               Content Version: 12.9  © 2006-2021 [employer]. Care instructions adapted under license by [facility] ([facility]). If you have questions about a medical condition or this instruction, always ask your healthcare professional. [place] [address] any warranty or liability for your use of this information. Patient Education        High Blood Pressure: Care Instructions  Overview     It's normal for blood pressure to go up and down throughout the day. But if it stays up, you have high blood pressure. Another name for high blood pressure is hypertension. Despite what a lot of people think, high blood pressure usually doesn't cause headaches or make you feel dizzy or lightheaded. It usually has no symptoms. But it does increase your risk of stroke, heart attack, and other problems. You and your doctor will talk about your risks of these problems based on your blood pressure. Your doctor will give you a goal for your blood pressure. Your goal will be based on your health and your age. Lifestyle changes, such as eating healthy and being active, are always important to help lower blood pressure. You might also take medicine to reach your blood pressure goal.  Follow-up care is a key part of your treatment and safety. Be sure to make and go to all appointments, and call your doctor if you are having problems. It's also a good idea to know your test results and keep a list of the medicines you take. How can you care for yourself at home? Medical treatment  · If you stop taking your medicine, your blood pressure will go back up. You may take one or more types of medicine to lower your blood pressure. Be safe with medicines. Take your medicine exactly as prescribed.  Call your doctor if you think you are having a problem with your medicine. · Talk to your doctor before you start taking aspirin every day. Aspirin can help certain people lower their risk of a heart attack or stroke. But taking aspirin isn't right for everyone, because it can cause serious bleeding. · See your doctor regularly. You may need to see the doctor more often at first or until your blood pressure comes down. · If you are taking blood pressure medicine, talk to your doctor before you take decongestants or anti-inflammatory medicine, such as ibuprofen. Some of these medicines can raise blood pressure. · Learn how to check your blood pressure at home. Lifestyle changes  · Stay at a healthy weight. This is especially important if you put on weight around the waist. Losing even 10 pounds can help you lower your blood pressure. · If your doctor recommends it, get more exercise. Walking is a good choice. Bit by bit, increase the amount you walk every day. Try for at least 30 minutes on most days of the week. You also may want to swim, bike, or do other activities. · Avoid or limit alcohol. Talk to your doctor about whether you can drink any alcohol. · Try to limit how much sodium you eat to less than 2,300 milligrams (mg) a day. Your doctor may ask you to try to eat less than 1,500 mg a day. · Eat plenty of fruits (such as bananas and oranges), vegetables, legumes, whole grains, and low-fat dairy products. · Lower the amount of saturated fat in your diet. Saturated fat is found in animal products such as milk, cheese, and meat. Limiting these foods may help you lose weight and also lower your risk for heart disease. · Do not smoke. Smoking increases your risk for heart attack and stroke. If you need help quitting, talk to your doctor about stop-smoking programs and medicines. These can increase your chances of quitting for good. When should you call for help? Call 911  anytime you think you may need emergency care.  This may mean having symptoms that suggest that your blood pressure is causing a serious heart or blood vessel problem. Your blood pressure may be over 180/120. For example, call 911 if:    · You have symptoms of a heart attack. These may include:  ? Chest pain or pressure, or a strange feeling in the chest.  ? Sweating. ? Shortness of breath. ? Nausea or vomiting. ? Pain, pressure, or a strange feeling in the back, neck, jaw, or upper belly or in one or both shoulders or arms. ? Lightheadedness or sudden weakness. ? A fast or irregular heartbeat.     · You have symptoms of a stroke. These may include:  ? Sudden numbness, tingling, weakness, or loss of movement in your face, arm, or leg, especially on only one side of your body. ? Sudden vision changes. ? Sudden trouble speaking. ? Sudden confusion or trouble understanding simple statements. ? Sudden problems with walking or balance. ? A sudden, severe headache that is different from past headaches.     · You have severe back or belly pain. Do not wait until your blood pressure comes down on its own. Get help right away. Call your doctor now or seek immediate care if:    · Your blood pressure is much higher than normal (such as 180/120 or higher), but you don't have symptoms.     · You think high blood pressure is causing symptoms, such as:  ? Severe headache.  ? Blurry vision. Watch closely for changes in your health, and be sure to contact your doctor if:    · Your blood pressure measures higher than your doctor recommends at least 2 times. That means the top number is higher or the bottom number is higher, or both.     · You think you may be having side effects from your blood pressure medicine. Where can you learn more? Go to https://Z[employer] Technologiesmarii.[employer]. org and sign in to your Pixelle account. Enter H131 in the [employer] box to learn more about \"High Blood Pressure: Care Instructions. \"     If you do not have an account, please click on the \"Sign Up Now\" link. Current as of: August 31, 2020               Content Version: 12.9  © 2006-2021 [employer]wise[employer]. Care instructions adapted under license by [facility] ([facility] If you have questions about a medical condition or this instruction, always ask your healthcare professional. [address] any warranty or liability for your use of this information.

## 2021-08-16 ENCOUNTER — OFFICE VISIT (OUTPATIENT)
Dept: BARIATRICS/WEIGHT MGMT | Age: 46
End: 2021-08-16

## 2021-08-16 VITALS — HEIGHT: 65 IN | WEIGHT: 293 LBS | BODY MASS INDEX: 48.82 KG/M2

## 2021-08-16 DIAGNOSIS — E66.01 MORBID OBESITY WITH BMI OF 50.0-59.9, ADULT (HCC): Primary | ICD-10-CM

## 2021-08-16 RX ORDER — ASPIRIN 81 MG
100 TABLET, DELAYED RELEASE (ENTERIC COATED) ORAL 2 TIMES DAILY
Qty: 30 TABLET | Refills: 1 | Status: SHIPPED | OUTPATIENT
Start: 2021-08-30 | End: 2021-10-19

## 2021-08-16 RX ORDER — OMEPRAZOLE 40 MG/1
40 CAPSULE, DELAYED RELEASE ORAL
Qty: 30 CAPSULE | Refills: 2 | Status: SHIPPED | OUTPATIENT
Start: 2021-08-16 | End: 2021-11-22 | Stop reason: SDUPTHER

## 2021-08-16 RX ORDER — METOCLOPRAMIDE 10 MG/1
10 TABLET ORAL EVERY 6 HOURS PRN
Qty: 30 TABLET | Refills: 0 | Status: SHIPPED | OUTPATIENT
Start: 2021-08-30 | End: 2021-09-23 | Stop reason: SDUPTHER

## 2021-08-16 RX ORDER — ONDANSETRON 4 MG/1
4 TABLET, FILM COATED ORAL EVERY 4 HOURS PRN
Qty: 30 TABLET | Refills: 0 | Status: SHIPPED | OUTPATIENT
Start: 2021-08-30 | End: 2021-09-13

## 2021-08-16 NOTE — PROGRESS NOTES
Rohit Rodriguez was seen today for pre-operative teaching class. She   was educated today on surgery procedure, possible complications, Lovenox self administration (teaching guide given to patient as well as Lovenox DVD viewed today), use of incentive spirometer for 2 weeks prior to surgery date and instructed to remain NPO after midnight the night before surgery, or risk cancellation of surgical procedure. Importance of recognizing signs and symptoms of wound infection were discussed as well as when to contact the physician. We discussed the flow of events on the day of surgery from admit to SDS, OR, PACU, and 7K admit. I reinforced the need to walk post operative on 7K and she voiced understanding of this. Pre-operative measurements were taken and scanned into chart as well as measurement for PO day 1 gastrograffin swallow test. SECA scale completed today. Educated on post-op pain medication and how to dispose of any unused pain medications. Advised to bring in prescription bottle with pain medication to 1 week post-op apt.

## 2021-08-17 NOTE — PROGRESS NOTES
Valdemar Connor lost 10 lbs  since joining [facility].  After nutrition evaluation and education, patient is considered to be a good surgical candidate from a MNT perspective. Patient's body composition for pre-op teaching class  was measured using the [facility] Scale. Results were saved and reviewed with the patient. Patient was educated on 2- week pre-operative nutrition protocol and post test completed. Pre-operative and post-operative diet guidelines were discussed and written information was provided. Nectar protein supplements were purchased. Vitamin regimen with Bariatric Advantage and/or Celebrate vitamins was explained, and patient purchased a 90 day supply at this visit. Importance of vitamin compliance was discussed and potential of vitamin deficiencies was reviewed. Encouraged continued physical activity. Patient signed agreement stating they are committed to lifelong follow up, smoking and alcohol cessation, vitamin compliance, and 2 week pre-operative dietary protocol.

## 2021-08-18 ENCOUNTER — HOSPITAL ENCOUNTER (OUTPATIENT)
Age: 46
Discharge: HOME | End: 2021-08-18
Payer: COMMERCIAL

## 2021-08-18 ENCOUNTER — HOSPITAL ENCOUNTER (OUTPATIENT)
Dept: GENERAL RADIOLOGY | Age: 46
Discharge: HOME | End: 2021-08-18
Payer: COMMERCIAL

## 2021-08-18 DIAGNOSIS — Z01.818 PRE-OP TESTING: ICD-10-CM

## 2021-08-18 LAB
ANION GAP SERPL CALCULATED.3IONS-SCNC: 8 MEQ/L (ref 8–16)
BASOPHILS # BLD: 0.3 %
BASOPHILS ABSOLUTE: 0 THOU/MM3 (ref 0–0.1)
BUN BLDV-MCNC: 18 MG/DL (ref 7–22)
CALCIUM SERPL-MCNC: 9.3 MG/DL (ref 8.5–10.5)
CHLORIDE BLD-SCNC: 100 MEQ/L (ref 98–111)
CO2: 24 MEQ/L (ref 23–33)
CREAT SERPL-MCNC: 0.6 MG/DL (ref 0.4–1.2)
EOSINOPHIL # BLD: 1.7 %
EOSINOPHILS ABSOLUTE: 0.2 THOU/MM3 (ref 0–0.4)
ERYTHROCYTE [DISTWIDTH] IN BLOOD BY AUTOMATED COUNT: 12.2 % (ref 11.5–14.5)
ERYTHROCYTE [DISTWIDTH] IN BLOOD BY AUTOMATED COUNT: 40.5 FL (ref 35–45)
GFR SERPL CREATININE-BSD FRML MDRD: > 90 ML/MIN/1.73M2
GLUCOSE BLD-MCNC: 177 MG/DL (ref 70–108)
HCT VFR BLD CALC: 44.2 % (ref 37–47)
HEMOGLOBIN: 14.8 GM/DL (ref 12–16)
IMMATURE GRANS (ABS): 0.05 THOU/MM3 (ref 0–0.07)
IMMATURE GRANULOCYTES: 0.5 %
LYMPHOCYTES # BLD: 29.6 %
LYMPHOCYTES ABSOLUTE: 2.8 THOU/MM3 (ref 1–4.8)
MCH RBC QN AUTO: 30.7 PG (ref 26–33)
MCHC RBC AUTO-ENTMCNC: 33.5 GM/DL (ref 32.2–35.5)
MCV RBC AUTO: 91.7 FL (ref 81–99)
MONOCYTES # BLD: 5.6 %
MONOCYTES ABSOLUTE: 0.5 THOU/MM3 (ref 0.4–1.3)
NUCLEATED RED BLOOD CELLS: 0 /100 WBC
PLATELET # BLD: 259 THOU/MM3 (ref 130–400)
PMV BLD AUTO: 11.2 FL (ref 9.4–12.4)
POTASSIUM SERPL-SCNC: 4 MEQ/L (ref 3.5–5.2)
RBC # BLD: 4.82 MILL/MM3 (ref 4.2–5.4)
SEG NEUTROPHILS: 62.3 %
SEGMENTED NEUTROPHILS ABSOLUTE COUNT: 5.9 THOU/MM3 (ref 1.8–7.7)
SODIUM BLD-SCNC: 132 MEQ/L (ref 135–145)
WBC # BLD: 9.5 THOU/MM3 (ref 4.8–10.8)

## 2021-08-18 PROCEDURE — 36415 COLL VENOUS BLD VENIPUNCTURE: CPT

## 2021-08-18 PROCEDURE — 71046 X-RAY EXAM CHEST 2 VIEWS: CPT

## 2021-08-18 PROCEDURE — 85025 COMPLETE CBC W/AUTO DIFF WBC: CPT

## 2021-08-18 PROCEDURE — 80048 BASIC METABOLIC PNL TOTAL CA: CPT

## 2021-08-25 LAB
CHOLESTEROL, TOTAL: 206 MG/DL (ref 0–199)
FASTING: YES
GLUCOSE BLD-MCNC: ABNORMAL MG/DL (ref 74–109)
HBA1C MFR BLD: 6.8 % (ref 4.4–6.4)
HDLC SERPL-MCNC: 41 MG/DL (ref 40–90)
LDL CHOLESTEROL CALCULATED: 108 MG/DL
TRIGL SERPL-MCNC: 284 MG/DL (ref 0–199)

## 2021-09-03 LAB — GLUCOSE BLD-MCNC: 211 MG/DL (ref 74–109)

## 2021-09-27 DIAGNOSIS — E11.9 DIABETES MELLITUS TYPE II, NON INSULIN DEPENDENT (HCC): ICD-10-CM

## 2021-09-27 DIAGNOSIS — I10 ESSENTIAL HYPERTENSION: ICD-10-CM

## 2021-09-27 DIAGNOSIS — Z01.818 PRE-OP TESTING: Primary | ICD-10-CM

## 2021-09-27 DIAGNOSIS — E66.01 MORBID OBESITY WITH BMI OF 50.0-59.9, ADULT (HCC): ICD-10-CM

## 2021-10-09 ENCOUNTER — HOSPITAL ENCOUNTER (OUTPATIENT)
Age: 46
Discharge: HOME | DRG: 621 | End: 2021-10-09
Payer: COMMERCIAL

## 2021-10-09 LAB
ANION GAP SERPL CALCULATED.3IONS-SCNC: 12 MEQ/L (ref 8–16)
BASOPHILS # BLD: 0.3 %
BASOPHILS ABSOLUTE: 0 THOU/MM3 (ref 0–0.1)
BUN BLDV-MCNC: 14 MG/DL (ref 7–22)
CALCIUM SERPL-MCNC: 9 MG/DL (ref 8.5–10.5)
CHLORIDE BLD-SCNC: 104 MEQ/L (ref 98–111)
CO2: 27 MEQ/L (ref 23–33)
CREAT SERPL-MCNC: 0.7 MG/DL (ref 0.4–1.2)
EOSINOPHIL # BLD: 0.7 %
EOSINOPHILS ABSOLUTE: 0.1 THOU/MM3 (ref 0–0.4)
ERYTHROCYTE [DISTWIDTH] IN BLOOD BY AUTOMATED COUNT: 11.9 % (ref 11.5–14.5)
ERYTHROCYTE [DISTWIDTH] IN BLOOD BY AUTOMATED COUNT: 39.1 FL (ref 35–45)
GFR SERPL CREATININE-BSD FRML MDRD: > 90 ML/MIN/1.73M2
GLUCOSE BLD-MCNC: 155 MG/DL (ref 70–108)
HCT VFR BLD CALC: 46.3 % (ref 37–47)
HEMOGLOBIN: 15.5 GM/DL (ref 12–16)
IMMATURE GRANS (ABS): 0.07 THOU/MM3 (ref 0–0.07)
IMMATURE GRANULOCYTES: 0.6 %
LYMPHOCYTES # BLD: 30.1 %
LYMPHOCYTES ABSOLUTE: 3.6 THOU/MM3 (ref 1–4.8)
MCH RBC QN AUTO: 30.2 PG (ref 26–33)
MCHC RBC AUTO-ENTMCNC: 33.5 GM/DL (ref 32.2–35.5)
MCV RBC AUTO: 90.3 FL (ref 81–99)
MONOCYTES # BLD: 4.2 %
MONOCYTES ABSOLUTE: 0.5 THOU/MM3 (ref 0.4–1.3)
NUCLEATED RED BLOOD CELLS: 0 /100 WBC
PLATELET # BLD: 314 THOU/MM3 (ref 130–400)
PMV BLD AUTO: 11.1 FL (ref 9.4–12.4)
POTASSIUM SERPL-SCNC: 4.2 MEQ/L (ref 3.5–5.2)
RBC # BLD: 5.13 MILL/MM3 (ref 4.2–5.4)
SEG NEUTROPHILS: 64.1 %
SEGMENTED NEUTROPHILS ABSOLUTE COUNT: 7.7 THOU/MM3 (ref 1.8–7.7)
SODIUM BLD-SCNC: 143 MEQ/L (ref 135–145)
WBC # BLD: 12 THOU/MM3 (ref 4.8–10.8)

## 2021-10-09 PROCEDURE — 36415 COLL VENOUS BLD VENIPUNCTURE: CPT

## 2021-10-09 PROCEDURE — 80048 BASIC METABOLIC PNL TOTAL CA: CPT

## 2021-10-09 PROCEDURE — 85025 COMPLETE CBC W/AUTO DIFF WBC: CPT

## 2021-10-09 NOTE — H&P
Meryle Catherine (:  1975)      ASSESSMENT:  1.  Morbid obesity (BMI 50)   2. Hypertension  3. Hyperlipidemia  4. Diabetes mellitus  5. Depression  6. Chronic lower back pain  7. GERD     PLAN:  1. Discussion again about the pros and cons of weight loss surgery. The risks benefits and alternatives to laparoscopic adjustable band, gastric sleeve and gastric Zain-en-Y bypass were discussed in detail. The pros and cons of robotic assisted, laparoscopic and open techniques were discussed. 2.  Behavior modification discussed again in regards to dietary habits. 3.  Nutritional education occurred during visit. Follow up  with dietitian for further evaluation and continue to follow recommendations as directed. 4.  Options for medical management of morbid obesity discussed. 5.  Improvement in fitness/exercise discussed with patient and the need for this with/without surgery. 6.  Medical necessity letter from PCP. 7.  Follow-up in one month at weight management program at [address] [place] [address]. 8.  Signs and symptoms reviewed with patient that would be concerning and need her to return to office for re-evaluation. Patient states she will call if she has questions or concerns. 9. Multivitamin  10. Psychology dilation completed. Follow-up as needed. 11. EGD completed. Results reviewed. H. pylori negative. 12.  Encouraged support groups  13. Send LOMN     Patient states that she has not been able to lose enough adequate excess body weight with medical management only and would like to proceed with a robotic sleeve gastrectomy for further weight loss.     More than 15 minutes spent with patient today.   Greater than 50% of the time was involved counseling, educaton and coordinating care.        SUBJECTIVE/OBJECTIVE:          Chief Complaint   Patient presents with    Follow-up       month 5 of 3 desires sleeve       HPI  BODØ is a 82-year-old female presents for follow-up in the weight management program secondary to her morbid obesity. Current weight 307 pounds. She has lost about 12 pounds in the program since starting. BMI 50. Taking multivitamin. Taken vitamin D3. Completed upper endoscopy. No Thompson's. H. pylori negative. Completed psychology evaluation. Attended support groups. Following with the dietitian. Continuing to work on portion control and food selection. Staying well-hydrated. Trying to improve with exercise/fitness. Doing more walking with her spouse and also yoga. Denies current chest or abdominal pain. No hematochezia or melena. No new urinary complaints. She admits she has not been able to lose enough adequate excess body weight with medical management only and is wishing to proceed with a sleeve gastrectomy. She states she is excited to proceed with surgery so as to continue to work towards her weight loss goals.     Review of Systems   Constitutional: Negative for activity change, appetite change, chills, diaphoresis, fatigue, fever and unexpected weight change. HENT: Negative for congestion, dental problem, drooling, ear discharge, ear pain, facial swelling, hearing loss, mouth sores, nosebleeds, postnasal drip, rhinorrhea, sinus pressure, sneezing, sore throat, tinnitus, trouble swallowing and voice change. Eyes: Negative for photophobia, pain, discharge, redness, itching and visual disturbance. Respiratory: Negative for apnea, cough, choking, chest tightness, shortness of breath, wheezing and stridor. Cardiovascular: Negative for chest pain, palpitations and leg swelling. Gastrointestinal: Negative for abdominal distention, abdominal pain, anal bleeding, blood in stool, constipation, diarrhea, nausea, rectal pain and vomiting. Endocrine: Negative.     Genitourinary: Negative for decreased urine volume, difficulty urinating, dyspareunia, dysuria, enuresis, flank pain, frequency, genital sores, hematuria, menstrual problem, pelvic pain, urgency, vaginal bleeding, vaginal discharge and vaginal pain. Musculoskeletal: Negative for arthralgias, back pain, gait problem, joint swelling, myalgias, neck pain and neck stiffness. Skin: Negative for color change, pallor, rash and wound. Allergic/Immunologic: Negative. Neurological: Negative for dizziness, tremors, seizures, syncope, facial asymmetry, speech difficulty, weakness, light-headedness, numbness and headaches. Hematological: Negative for adenopathy. Does not bruise/bleed easily. Psychiatric/Behavioral: Negative for agitation, behavioral problems, confusion, decreased concentration, dysphoric mood, hallucinations, self-injury, sleep disturbance and suicidal ideas.  The patient is not nervous/anxious and is not hyperactive.          Past Medical History        Past Medical History:   Diagnosis Date    Back pain      Depression      Diabetes mellitus (HCC)      GERD (gastroesophageal reflux disease)      Hypercholesteremia      Hypertension      PONV (postoperative nausea and vomiting)      UTI (urinary tract infection)              Past Surgical History         Past Surgical History:   Procedure Laterality Date    CHOLECYSTECTOMY        ENDOMETRIAL ABLATION        HYSTERECTOMY ABDOMINAL N/A 12/12/2017     ROBOTIC TOTAL HYSTERECTOMY WITH BILATERAL SALPINGECTOMY performed by Macario Vasques MD at [facility]         vocal cord mass removed    OVARIAN CYST REMOVAL        UPPER GASTROINTESTINAL ENDOSCOPY N/A 2/9/2021     EGD BIOPSY performed by Mello Carmen MD at [facility]            Current Facility-Administered Medications          Current Outpatient Medications   Medication Sig Dispense Refill    FLUoxetine (PROZAC) 20 MG capsule Take 3 capsules by mouth daily 360 capsule 3    vitamin D 25 MCG (1000 UT) CAPS Take 1 capsule by mouth daily         dapagliflozin (FARXIGA) 5 MG tablet Take 1 tablet by mouth every morning 90 tablet 1    atorvastatin (LIPITOR) 20 MG tablet Take 1 tablet by mouth daily 90 tablet 1    Multiple Vitamins-Minerals (MULTIVITAMIN ADULT PO) Take 1 tablet by mouth daily         lisinopril-hydroCHLOROthiazide (PRINZIDE;ZESTORETIC) 20-12.5 MG per tablet Take 1 tablet by mouth daily 90 tablet 3    Misc Natural Products (CRANBERRY/PROBIOTIC) TABS Take 1 tablet by mouth daily        Blood Glucose Monitoring Suppl (AGAMATRIX JAZZ WIRELESS 2) w/Device KIT 1 each by Does not apply route once for 1 dose 1 kit 0    blood glucose test strips (AGAMATRIX JAZZ TEST) strip 1 each by In Vitro route 2 times daily 200 each 3    Blood Glucose Calibration (AGAMATRIX CONTROL) SOLN 1 each by In Vitro route as needed (glucometer calibration) 1 each 0    AgaMatrix Ultra-Thin Lancets MISC 1 each by Does not apply route 2 times daily 200 each 3      No current facility-administered medications for this visit.            No Known Allergies     Family History         Family History   Problem Relation Age of Onset    Obesity Mother      Heart Disease Father      No Known Problems Sister      No Known Problems Brother              Social History               Socioeconomic History    Marital status:        Spouse name: Not on file    Number of children: Not on file    Years of education: Not on file    Highest education level: Not on file   Occupational History    Not on file   Tobacco Use    Smoking status: Never Smoker    Smokeless tobacco: Never Used   Vaping Use    Vaping Use: Never used   Substance and Sexual Activity    Alcohol use: No    Drug use: No    Sexual activity: Not on file   Other Topics Concern    Not on file   Social History Narrative    Not on file      Social Determinants of Health      Financial Resource Strain:     Difficulty of Paying Living Expenses:    Food Insecurity:     Worried About Running Out of Food in the Last Year:     Ran Out of Food in the Last Year:    Transportation Needs:     Lack of Transportation (Medical):  Lack of Transportation (Non-Medical):    Physical Activity:     Days of Exercise per Week:     Minutes of Exercise per Session:    Stress:     Feeling of Stress :    Social Connections:     Frequency of Communication with Friends and Family:     Frequency of Social Gatherings with Friends and Family:     Attends Temple Services:     Active Member of Clubs or Organizations:     Attends Club or Organization Meetings:     Marital Status:    Intimate Partner Violence:     Fear of Current or Ex-Partner:     Emotionally Abused:     Physically Abused:     Sexually Abused:          Vitals       Vitals:     05/14/21 1103   BP: 124/76   Site: Right Upper Arm   Position: Sitting   Cuff Size: Large Adult   Pulse: 66   Resp: 18   Temp: 97.3 °F (36.3 °C)   TempSrc: Infrared   Weight: (!) 307 lb 3.2 oz (139.3 kg)         Body mass index is 50.73 kg/m².         Wt Readings from Last 3 Encounters:   05/14/21 (!) 307 lb 3.2 oz (139.3 kg)   02/22/21 (!) 310 lb 6.4 oz (140.8 kg)   02/09/21 (!) 313 lb (142 kg)      Physical Exam  Vitals reviewed. Constitutional:       General: She is not in acute distress. Appearance: She is well-developed. She is not diaphoretic. HENT:      Head: Normocephalic and atraumatic. Right Ear: External ear normal.      Left Ear: External ear normal.      Nose: Nose normal.   Eyes:      General: No scleral icterus. Right eye: No discharge. Left eye: No discharge. Conjunctiva/sclera: Conjunctivae normal.   Cardiovascular:      Rate and Rhythm: Normal rate and regular rhythm. Heart sounds: Normal heart sounds. Pulmonary:      Effort: Pulmonary effort is normal. No respiratory distress. Breath sounds: Normal breath sounds. No wheezing or rales. Chest:      Chest wall: No tenderness. Abdominal:      General: Bowel sounds are normal. There is no distension. Palpations: Abdomen is soft. There is no mass. Tenderness: There is no abdominal tenderness. There is no guarding or rebound. Musculoskeletal:         General: No tenderness. Normal range of motion. Cervical back: Normal range of motion and neck supple. Skin:     General: Skin is warm and dry. Coloration: Skin is not pale. Findings: No erythema or rash. Neurological:      Mental Status: She is alert and oriented to person, place, and time. Cranial Nerves: No cranial nerve deficit. Psychiatric:         Behavior: Behavior normal.         Thought Content:  Thought content normal.         Judgment: Judgment normal.         CBC         Lab Results   Component Value Date     WBC 8.4 11/14/2020     RBC 4.54 11/14/2020     HGB 13.9 11/14/2020     HCT 41.1 11/14/2020     MCV 90.5 11/14/2020     MCH 30.6 11/14/2020     MCHC 33.8 11/14/2020     RDW 13.1 12/30/2017      11/14/2020     MPV 11.4 11/14/2020     RBCMORP NORMAL 08/11/2017     SEGSPCT 61.2 11/14/2020     LABLYMP 31.6 11/14/2020     MONOPCT 5.0 11/14/2020     LABEOS 1.3 11/14/2020     BASO 0.4 11/14/2020     NRBC 0 11/14/2020     SEGSABS 5.1 11/14/2020     LYMPHSABS 2.7 11/14/2020     MONOSABS 0.4 11/14/2020     EOSABS 0.1 11/14/2020     BASOSABS 0.0 11/14/2020      BMP/CMP         Lab Results   Component Value Date     GLUCOSE 139 11/14/2020     CREATININE 0.6 11/14/2020     BUN 16 11/14/2020      11/14/2020     K 4.2 11/14/2020      11/14/2020     CO2 24 11/14/2020     CALCIUM 9.2 11/14/2020     AST 27 11/14/2020     ALKPHOS 76 11/14/2020     PROT 7.1 11/14/2020     LABALBU 4.3 11/14/2020     BILITOT 0.6 11/14/2020     ALT 49 11/14/2020      PREALBUMIN         Lab Results   Component Value Date     PREALBUMIN 26.9 11/14/2020      VITAMIN B12         Lab Results   Component Value Date     [id number] 415 11/14/2020      VITAMIN D         Lab Results   Component Value Date     VITD25 35 11/14/2020      PTH        Lab Results   Component Value Date     IPTH 53.6 2020      VITAMIN B1/ THIAMINE         Lab Results   Component Value Date     [id number] 144 2020      LIPID SCREEN (FASTING)         Lab Results   Component Value Date     CHOL 240 2020     TRIG 274 2020     HDL 42 2020     LDLCALC 143 2020   ,   HGA1C (T2DM ONLY)         Lab Results   Component Value Date     LABA1C 6.4 2020     AVGG 132 2020      TSH         Lab Results   Component Value Date     TSH 2.570 2020      IRON         Lab Results   Component Value Date     IRON 95 2020      TIBC        Lab Results   Component Value Date     TIBC 304 2020      FERRITIN        Lab Results   Component Value Date     FERRITIN 111 2020      VITAMIN A        Lab Results   Component Value Date     RETINOL SEE BELOW 2020      NICOTINE  No results found for: NMET  UDS        Lab Results   Component Value Date     UDP SEE BELOW 2020      PSA  No results found for: LABPSA  GFR        Lab Results   Component Value Date     LABGLOM >90 2020      DEXA  No results found for this or any previous visit.     Imaging -      OPERATIVE REPORT     PATIENT NAME: [place] Funk                    :        1975  MED REC NO:   [id number]                           ROOM:  ACCOUNT NO:   [id number]                           ADMIT DATE: 2021  PROVIDER: Christina Long M.D.     DATE OF PROCEDURE:  2021     PROCEDURES:  EGD plus biopsy.     SURGEON: Holli Rodriguez M.D.     INDICATION FOR PROCEDURE:  The patient is here for pre gastric sleeve  clearance.  See the consult from the office and preop note for rest of  clinicals.     ASA CLASSIFICATION:  II.     MEDICATION:  As per Anesthesia.     BIOPSY:  Yes.     PHOTOGRAPH:  Yes.     DESCRIPTION OF THE PROCEDURE:  Informed consent was obtained after  explaining risks and benefits of the procedure and conscious sedation.    Possible complications including bleeding, perforation, reaction to  medicine, but not limiting to death, were discussed.     Afterwards, GIF-180 gastroscope was advanced through oropharynx,  esophagus, stomach into the duodenum.  Mucosa looks very healthy.  The  patient has family history of celiac but there is no endoscopic  suspicion for it.  No biopsies done.  Scope was withdrawn.  Gastritis  biopsies were taken to check for HP.  Retroflex exam showed gastritis in  the body.  Fundus and cardia were relatively normal.  Small hiatal  hernia, on top of that,  minimal esophagitis distal, irregular GE  junction.  Using NBI light, close examination was done and multiple  biopsies were taken to check for Sawant's.  The patient tolerated the  procedure well.     IMPRESSION:  1.  Mild GERD with possible Sawant's. 2.  Gastritis.  We will check for HP.     RECOMMENDATIONS:  The patient is cleared for gastric sleeve surgery.  We  will have an office visit to discuss biopsy and make a long-term plan as  to how to manage GERD.  We will set it up in couple of months.     BLOOD LOSS: Ilana Owens M.D.     D: 02/09/2021 13:40:00       T: 02/09/2021 16:52:30         PATHOLOGY REPORT                       ATTN: Speedy Jung                  REQ: Mushtaq Laoby     Copies To:   BABITA CARLSON     Clinical Information: PRE BARIATRIC     FINAL DIAGNOSIS:   A: Stomach, biopsies:    Reactive/chemical gastropathy.    Negative for H. pylori organisms. B: Esophagus, biopsies:    Chronic reflux esophagitis and chronic carditis.    Negative for intestinal metaplasia and dysplasia. Specimen:   A) STOMACH BIOPSY, GASTRIC, GASTRITIS   B) BIOPSY OF ESOPHAGUS, CHECK FOR SAWANT'S     Gross Examination:   A - The container is labeled Naga Heading, stomach, gastritis. Received in formalin are two bits of tan tissue varying in size from 2   mm up to 4 mm.  1 ns. B - The container is labeled Naga Heading, esophagus, Sawant's.    Received in formalin are two 2 mm bits of tan tissue.  1 ns. MTK/DKR:[id number]     Microscopic Examination:   A: The specimen consists of bits of gastric antral mucosa.  There is   mucosal distortion with smooth muscle in the lamina propria accompanied   by corkscrewing of the gastric pits.  There is evidence of mucosal   repair.  Significant chronic and active inflammation is not identified.    In addition, there is no evidence of intestinal metaplasia, dysplasia,   or neoplasia.  H. pylori organisms are also not seen. B: The specimen consists of fragments of the squamocolumnar junction. The squamous mucosa demonstrates findings consistent with chronic   reflux esophagitis.  The glandular mucosa consists of gastric foveolar   type epithelium. Gregery Mar is a chronic inflammatory infiltrate within the   glandular mucosa.  There is no evidence of intestinal metaplasia or   dysplasia.                                            Juju Fang M.D., F.C.A.P.          Patient Active Problem List   Diagnosis    Type 2 diabetes mellitus without complication, without long-term current use of insulin ([facility] [address]    Essential hypertension    Morbid obesity with BMI of 50.0-59.9, adult ([facility] [address]    Recurrent major depressive disorder, in full remission ([facility] [address]      An electronic signature was used to authenticate this note.     --Deedee Villarreal MD     ADDENDUM:  1. Schedule Francheska for robotic sleeve gastrectomy. 2. She will undergo pre-operative clearance per anesthesia guidelines with risk factors listed under the past medical history diagnosis & problem list.  3. The risks, benefits and alternatives were discussed with Dulce Maria Kaur including non-operative management. The pros and cons of robotic, laparoscopic and open techniques were discussed. All questions answered. She understands and wishes to proceed with surgical intervention. 4. Restrictions discussed with Dulce Maria Kaur and she expresses understanding.   5. She is advised to call back directly if there are further questions/concerns, or if her symptoms worsen prior to surgery.     Electronically signed by Brandie Mayo MD on 10/9/21 at 6:05 PM EDT

## 2021-10-11 ENCOUNTER — ANESTHESIA (OUTPATIENT)
Dept: OPERATING ROOM | Age: 46
DRG: 621 | End: 2021-10-11
Payer: COMMERCIAL

## 2021-10-11 ENCOUNTER — ANESTHESIA EVENT (OUTPATIENT)
Dept: OPERATING ROOM | Age: 46
DRG: 621 | End: 2021-10-11
Payer: COMMERCIAL

## 2021-10-11 ENCOUNTER — HOSPITAL ENCOUNTER (INPATIENT)
Age: 46
LOS: 2 days | Discharge: HOME | DRG: 621 | End: 2021-10-13
Attending: SURGERY | Admitting: SURGERY
Payer: COMMERCIAL

## 2021-10-11 VITALS — TEMPERATURE: 97.7 F | OXYGEN SATURATION: 99 % | SYSTOLIC BLOOD PRESSURE: 135 MMHG | DIASTOLIC BLOOD PRESSURE: 74 MMHG

## 2021-10-11 DIAGNOSIS — Z98.84 S/P LAPAROSCOPIC SLEEVE GASTRECTOMY: Primary | ICD-10-CM

## 2021-10-11 PROBLEM — E66.01 MORBID OBESITY (HCC): Status: ACTIVE | Noted: 2021-10-11

## 2021-10-11 LAB
GLUCOSE BLD-MCNC: 183 MG/DL (ref 70–108)
GLUCOSE BLD-MCNC: 189 MG/DL (ref 70–108)
GLUCOSE BLD-MCNC: 227 MG/DL (ref 70–108)

## 2021-10-11 PROCEDURE — 6360000002 HC RX W HCPCS: Performed by: SURGERY

## 2021-10-11 PROCEDURE — 2500000003 HC RX 250 WO HCPCS: Performed by: SURGERY

## 2021-10-11 PROCEDURE — 6360000002 HC RX W HCPCS

## 2021-10-11 PROCEDURE — 43775 LAP SLEEVE GASTRECTOMY: CPT | Performed by: SURGERY

## 2021-10-11 PROCEDURE — 82948 REAGENT STRIP/BLOOD GLUCOSE: CPT

## 2021-10-11 PROCEDURE — 6370000000 HC RX 637 (ALT 250 FOR IP): Performed by: SURGERY

## 2021-10-11 PROCEDURE — 8E0W4CZ ROBOTIC ASSISTED PROCEDURE OF TRUNK REGION, PERCUTANEOUS ENDOSCOPIC APPROACH: ICD-10-PCS | Performed by: SURGERY

## 2021-10-11 PROCEDURE — 0DB64Z3 EXCISION OF STOMACH, PERCUTANEOUS ENDOSCOPIC APPROACH, VERTICAL: ICD-10-PCS | Performed by: SURGERY

## 2021-10-11 PROCEDURE — 2500000003 HC RX 250 WO HCPCS: Performed by: ANESTHESIOLOGY

## 2021-10-11 PROCEDURE — 2709999900 HC NON-CHARGEABLE SUPPLY: Performed by: SURGERY

## 2021-10-11 PROCEDURE — 88300 SURGICAL PATH GROSS: CPT

## 2021-10-11 PROCEDURE — 2580000003 HC RX 258: Performed by: SURGERY

## 2021-10-11 PROCEDURE — 3600000009 HC SURGERY ROBOT BASE: Performed by: SURGERY

## 2021-10-11 PROCEDURE — 1200000000 HC SEMI PRIVATE

## 2021-10-11 PROCEDURE — 3700000000 HC ANESTHESIA ATTENDED CARE: Performed by: SURGERY

## 2021-10-11 PROCEDURE — 7100000011 HC PHASE II RECOVERY - ADDTL 15 MIN: Performed by: SURGERY

## 2021-10-11 PROCEDURE — 3700000001 HC ADD 15 MINUTES (ANESTHESIA): Performed by: SURGERY

## 2021-10-11 PROCEDURE — C9113 INJ PANTOPRAZOLE SODIUM, VIA: HCPCS | Performed by: SURGERY

## 2021-10-11 PROCEDURE — 2500000003 HC RX 250 WO HCPCS

## 2021-10-11 PROCEDURE — 7100000010 HC PHASE II RECOVERY - FIRST 15 MIN: Performed by: SURGERY

## 2021-10-11 PROCEDURE — 6360000002 HC RX W HCPCS: Performed by: ANESTHESIOLOGY

## 2021-10-11 PROCEDURE — 7100000000 HC PACU RECOVERY - FIRST 15 MIN: Performed by: SURGERY

## 2021-10-11 PROCEDURE — S2900 ROBOTIC SURGICAL SYSTEM: HCPCS | Performed by: SURGERY

## 2021-10-11 PROCEDURE — 7100000001 HC PACU RECOVERY - ADDTL 15 MIN: Performed by: SURGERY

## 2021-10-11 PROCEDURE — 3600000019 HC SURGERY ROBOT ADDTL 15MIN: Performed by: SURGERY

## 2021-10-11 PROCEDURE — 2720000010 HC SURG SUPPLY STERILE: Performed by: SURGERY

## 2021-10-11 RX ORDER — ONDANSETRON 2 MG/ML
4 INJECTION INTRAMUSCULAR; INTRAVENOUS ONCE
Status: COMPLETED | OUTPATIENT
Start: 2021-10-11 | End: 2021-10-11

## 2021-10-11 RX ORDER — HYOSCYAMINE SULFATE 0.125 MG
125 TABLET,DISINTEGRATING ORAL EVERY 4 HOURS PRN
Status: DISCONTINUED | OUTPATIENT
Start: 2021-10-11 | End: 2021-10-13 | Stop reason: HOSPADM

## 2021-10-11 RX ORDER — EPHEDRINE SULFATE/0.9% NACL/PF 50 MG/5 ML
SYRINGE (ML) INTRAVENOUS PRN
Status: DISCONTINUED | OUTPATIENT
Start: 2021-10-11 | End: 2021-10-11 | Stop reason: SDUPTHER

## 2021-10-11 RX ORDER — CEFOXITIN 2 G/1
INJECTION, POWDER, FOR SOLUTION INTRAVENOUS
Status: DISPENSED
Start: 2021-10-11 | End: 2021-10-11

## 2021-10-11 RX ORDER — LABETALOL 20 MG/4 ML (5 MG/ML) INTRAVENOUS SYRINGE
20 ONCE
Status: COMPLETED | OUTPATIENT
Start: 2021-10-11 | End: 2021-10-11

## 2021-10-11 RX ORDER — SODIUM CHLORIDE 0.9 % (FLUSH) 0.9 %
10 SYRINGE (ML) INJECTION EVERY 12 HOURS SCHEDULED
Status: DISCONTINUED | OUTPATIENT
Start: 2021-10-11 | End: 2021-10-13 | Stop reason: HOSPADM

## 2021-10-11 RX ORDER — HYDRALAZINE HYDROCHLORIDE 20 MG/ML
INJECTION INTRAMUSCULAR; INTRAVENOUS
Status: COMPLETED
Start: 2021-10-11 | End: 2021-10-11

## 2021-10-11 RX ORDER — ONDANSETRON 2 MG/ML
INJECTION INTRAMUSCULAR; INTRAVENOUS PRN
Status: DISCONTINUED | OUTPATIENT
Start: 2021-10-11 | End: 2021-10-11 | Stop reason: SDUPTHER

## 2021-10-11 RX ORDER — FENTANYL CITRATE 50 UG/ML
INJECTION, SOLUTION INTRAMUSCULAR; INTRAVENOUS PRN
Status: DISCONTINUED | OUTPATIENT
Start: 2021-10-11 | End: 2021-10-11 | Stop reason: SDUPTHER

## 2021-10-11 RX ORDER — FENTANYL CITRATE 50 UG/ML
25 INJECTION, SOLUTION INTRAMUSCULAR; INTRAVENOUS EVERY 5 MIN PRN
Status: DISCONTINUED | OUTPATIENT
Start: 2021-10-11 | End: 2021-10-11 | Stop reason: HOSPADM

## 2021-10-11 RX ORDER — MEPERIDINE HYDROCHLORIDE 25 MG/ML
12.5 INJECTION INTRAMUSCULAR; INTRAVENOUS; SUBCUTANEOUS EVERY 5 MIN PRN
Status: DISCONTINUED | OUTPATIENT
Start: 2021-10-11 | End: 2021-10-11 | Stop reason: HOSPADM

## 2021-10-11 RX ORDER — MORPHINE SULFATE 2 MG/ML
2 INJECTION, SOLUTION INTRAMUSCULAR; INTRAVENOUS
Status: DISCONTINUED | OUTPATIENT
Start: 2021-10-11 | End: 2021-10-13 | Stop reason: HOSPADM

## 2021-10-11 RX ORDER — NICOTINE POLACRILEX 4 MG
15 LOZENGE BUCCAL PRN
Status: DISCONTINUED | OUTPATIENT
Start: 2021-10-11 | End: 2021-10-13 | Stop reason: HOSPADM

## 2021-10-11 RX ORDER — DEXAMETHASONE SODIUM PHOSPHATE 10 MG/ML
INJECTION, EMULSION INTRAMUSCULAR; INTRAVENOUS PRN
Status: DISCONTINUED | OUTPATIENT
Start: 2021-10-11 | End: 2021-10-11 | Stop reason: SDUPTHER

## 2021-10-11 RX ORDER — ROCURONIUM BROMIDE 10 MG/ML
INJECTION, SOLUTION INTRAVENOUS PRN
Status: DISCONTINUED | OUTPATIENT
Start: 2021-10-11 | End: 2021-10-11 | Stop reason: SDUPTHER

## 2021-10-11 RX ORDER — SODIUM CHLORIDE 0.9 % (FLUSH) 0.9 %
5-40 SYRINGE (ML) INJECTION EVERY 12 HOURS SCHEDULED
Status: DISCONTINUED | OUTPATIENT
Start: 2021-10-11 | End: 2021-10-11 | Stop reason: HOSPADM

## 2021-10-11 RX ORDER — KETOROLAC TROMETHAMINE 30 MG/ML
15 INJECTION, SOLUTION INTRAMUSCULAR; INTRAVENOUS EVERY 6 HOURS
Status: DISCONTINUED | OUTPATIENT
Start: 2021-10-11 | End: 2021-10-13 | Stop reason: HOSPADM

## 2021-10-11 RX ORDER — SODIUM CHLORIDE 9 MG/ML
25 INJECTION, SOLUTION INTRAVENOUS PRN
Status: DISCONTINUED | OUTPATIENT
Start: 2021-10-11 | End: 2021-10-13 | Stop reason: HOSPADM

## 2021-10-11 RX ORDER — LIDOCAINE HCL/PF 100 MG/5ML
SYRINGE (ML) INJECTION PRN
Status: DISCONTINUED | OUTPATIENT
Start: 2021-10-11 | End: 2021-10-11 | Stop reason: SDUPTHER

## 2021-10-11 RX ORDER — SODIUM CHLORIDE 0.9 % (FLUSH) 0.9 %
5-40 SYRINGE (ML) INJECTION PRN
Status: DISCONTINUED | OUTPATIENT
Start: 2021-10-11 | End: 2021-10-11 | Stop reason: HOSPADM

## 2021-10-11 RX ORDER — SCOLOPAMINE TRANSDERMAL SYSTEM 1 MG/1
1 PATCH, EXTENDED RELEASE TRANSDERMAL
Status: DISCONTINUED | OUTPATIENT
Start: 2021-10-14 | End: 2021-10-13 | Stop reason: HOSPADM

## 2021-10-11 RX ORDER — MORPHINE SULFATE 2 MG/ML
4 INJECTION, SOLUTION INTRAMUSCULAR; INTRAVENOUS
Status: DISCONTINUED | OUTPATIENT
Start: 2021-10-11 | End: 2021-10-13 | Stop reason: HOSPADM

## 2021-10-11 RX ORDER — SODIUM CHLORIDE 9 MG/ML
INJECTION, SOLUTION INTRAVENOUS CONTINUOUS
Status: DISCONTINUED | OUTPATIENT
Start: 2021-10-11 | End: 2021-10-13 | Stop reason: HOSPADM

## 2021-10-11 RX ORDER — SCOLOPAMINE TRANSDERMAL SYSTEM 1 MG/1
1 PATCH, EXTENDED RELEASE TRANSDERMAL
Status: DISCONTINUED | OUTPATIENT
Start: 2021-10-11 | End: 2021-10-11

## 2021-10-11 RX ORDER — LISINOPRIL 20 MG/1
20 TABLET ORAL DAILY
Status: DISCONTINUED | OUTPATIENT
Start: 2021-10-11 | End: 2021-10-13 | Stop reason: HOSPADM

## 2021-10-11 RX ORDER — LABETALOL 20 MG/4 ML (5 MG/ML) INTRAVENOUS SYRINGE
5 EVERY 10 MIN PRN
Status: DISCONTINUED | OUTPATIENT
Start: 2021-10-11 | End: 2021-10-11 | Stop reason: HOSPADM

## 2021-10-11 RX ORDER — PROMETHAZINE HYDROCHLORIDE 25 MG/ML
6.25 INJECTION, SOLUTION INTRAMUSCULAR; INTRAVENOUS
Status: DISCONTINUED | OUTPATIENT
Start: 2021-10-11 | End: 2021-10-11

## 2021-10-11 RX ORDER — LISINOPRIL AND HYDROCHLOROTHIAZIDE 20; 12.5 MG/1; MG/1
1 TABLET ORAL DAILY
Status: DISCONTINUED | OUTPATIENT
Start: 2021-10-11 | End: 2021-10-11 | Stop reason: RX

## 2021-10-11 RX ORDER — PROMETHAZINE HYDROCHLORIDE 25 MG/ML
6.25 INJECTION, SOLUTION INTRAMUSCULAR; INTRAVENOUS EVERY 6 HOURS PRN
Status: DISCONTINUED | OUTPATIENT
Start: 2021-10-11 | End: 2021-10-13 | Stop reason: HOSPADM

## 2021-10-11 RX ORDER — BUPIVACAINE HYDROCHLORIDE AND EPINEPHRINE 5; 5 MG/ML; UG/ML
INJECTION, SOLUTION EPIDURAL; INTRACAUDAL; PERINEURAL PRN
Status: DISCONTINUED | OUTPATIENT
Start: 2021-10-11 | End: 2021-10-11 | Stop reason: ALTCHOICE

## 2021-10-11 RX ORDER — DEXTROSE MONOHYDRATE 50 MG/ML
100 INJECTION, SOLUTION INTRAVENOUS PRN
Status: DISCONTINUED | OUTPATIENT
Start: 2021-10-11 | End: 2021-10-13 | Stop reason: HOSPADM

## 2021-10-11 RX ORDER — METOCLOPRAMIDE HYDROCHLORIDE 5 MG/ML
10 INJECTION INTRAMUSCULAR; INTRAVENOUS EVERY 6 HOURS PRN
Status: DISCONTINUED | OUTPATIENT
Start: 2021-10-11 | End: 2021-10-13 | Stop reason: HOSPADM

## 2021-10-11 RX ORDER — SODIUM CHLORIDE 9 MG/ML
25 INJECTION, SOLUTION INTRAVENOUS PRN
Status: DISCONTINUED | OUTPATIENT
Start: 2021-10-11 | End: 2021-10-11 | Stop reason: HOSPADM

## 2021-10-11 RX ORDER — ONDANSETRON 2 MG/ML
4 INJECTION INTRAMUSCULAR; INTRAVENOUS
Status: DISCONTINUED | OUTPATIENT
Start: 2021-10-11 | End: 2021-10-11 | Stop reason: HOSPADM

## 2021-10-11 RX ORDER — DEXTROSE MONOHYDRATE 25 G/50ML
12.5 INJECTION, SOLUTION INTRAVENOUS PRN
Status: DISCONTINUED | OUTPATIENT
Start: 2021-10-11 | End: 2021-10-13 | Stop reason: HOSPADM

## 2021-10-11 RX ORDER — FENTANYL CITRATE 50 UG/ML
50 INJECTION, SOLUTION INTRAMUSCULAR; INTRAVENOUS EVERY 5 MIN PRN
Status: DISCONTINUED | OUTPATIENT
Start: 2021-10-11 | End: 2021-10-11 | Stop reason: HOSPADM

## 2021-10-11 RX ORDER — PROMETHAZINE HYDROCHLORIDE 25 MG/1
25 SUPPOSITORY RECTAL EVERY 6 HOURS PRN
Status: DISCONTINUED | OUTPATIENT
Start: 2021-10-11 | End: 2021-10-13 | Stop reason: HOSPADM

## 2021-10-11 RX ORDER — ONDANSETRON 2 MG/ML
4 INJECTION INTRAMUSCULAR; INTRAVENOUS EVERY 6 HOURS
Status: DISCONTINUED | OUTPATIENT
Start: 2021-10-11 | End: 2021-10-13 | Stop reason: HOSPADM

## 2021-10-11 RX ORDER — BUPIVACAINE HYDROCHLORIDE 5 MG/ML
INJECTION, SOLUTION PERINEURAL PRN
Status: DISCONTINUED | OUTPATIENT
Start: 2021-10-11 | End: 2021-10-11 | Stop reason: ALTCHOICE

## 2021-10-11 RX ORDER — HYDROCHLOROTHIAZIDE 25 MG/1
12.5 TABLET ORAL DAILY
Status: DISCONTINUED | OUTPATIENT
Start: 2021-10-11 | End: 2021-10-13 | Stop reason: HOSPADM

## 2021-10-11 RX ORDER — PHENYLEPHRINE HYDROCHLORIDE 10 MG/ML
INJECTION INTRAVENOUS PRN
Status: DISCONTINUED | OUTPATIENT
Start: 2021-10-11 | End: 2021-10-11 | Stop reason: SDUPTHER

## 2021-10-11 RX ORDER — SODIUM CHLORIDE 0.9 % (FLUSH) 0.9 %
10 SYRINGE (ML) INJECTION PRN
Status: DISCONTINUED | OUTPATIENT
Start: 2021-10-11 | End: 2021-10-13 | Stop reason: HOSPADM

## 2021-10-11 RX ORDER — CIPROFLOXACIN 2 MG/ML
INJECTION, SOLUTION INTRAVENOUS PRN
Status: DISCONTINUED | OUTPATIENT
Start: 2021-10-11 | End: 2021-10-11 | Stop reason: SDUPTHER

## 2021-10-11 RX ORDER — PROMETHAZINE HYDROCHLORIDE 25 MG/ML
25 INJECTION, SOLUTION INTRAMUSCULAR; INTRAVENOUS ONCE
Status: COMPLETED | OUTPATIENT
Start: 2021-10-11 | End: 2021-10-11

## 2021-10-11 RX ORDER — HYDRALAZINE HYDROCHLORIDE 20 MG/ML
5 INJECTION INTRAMUSCULAR; INTRAVENOUS EVERY 10 MIN PRN
Status: DISCONTINUED | OUTPATIENT
Start: 2021-10-11 | End: 2021-10-11 | Stop reason: HOSPADM

## 2021-10-11 RX ORDER — PROPOFOL 10 MG/ML
INJECTION, EMULSION INTRAVENOUS PRN
Status: DISCONTINUED | OUTPATIENT
Start: 2021-10-11 | End: 2021-10-11 | Stop reason: SDUPTHER

## 2021-10-11 RX ORDER — MIDAZOLAM HYDROCHLORIDE 1 MG/ML
INJECTION INTRAMUSCULAR; INTRAVENOUS PRN
Status: DISCONTINUED | OUTPATIENT
Start: 2021-10-11 | End: 2021-10-11 | Stop reason: SDUPTHER

## 2021-10-11 RX ORDER — PANTOPRAZOLE SODIUM 40 MG/10ML
40 INJECTION, POWDER, LYOPHILIZED, FOR SOLUTION INTRAVENOUS DAILY
Status: DISCONTINUED | OUTPATIENT
Start: 2021-10-11 | End: 2021-10-13 | Stop reason: HOSPADM

## 2021-10-11 RX ORDER — SODIUM CHLORIDE 9 MG/ML
10 INJECTION INTRAVENOUS DAILY
Status: DISCONTINUED | OUTPATIENT
Start: 2021-10-11 | End: 2021-10-13 | Stop reason: HOSPADM

## 2021-10-11 RX ADMIN — SODIUM CHLORIDE: 9 INJECTION, SOLUTION INTRAVENOUS at 14:10

## 2021-10-11 RX ADMIN — SODIUM CHLORIDE 25 ML: 9 INJECTION, SOLUTION INTRAVENOUS at 07:00

## 2021-10-11 RX ADMIN — PROPOFOL 200 MG: 10 INJECTION, EMULSION INTRAVENOUS at 07:30

## 2021-10-11 RX ADMIN — CEFOXITIN 3000 MG: 1 INJECTION, POWDER, FOR SOLUTION INTRAVENOUS at 08:03

## 2021-10-11 RX ADMIN — INSULIN LISPRO 2 UNITS: 100 INJECTION, SOLUTION INTRAVENOUS; SUBCUTANEOUS at 18:21

## 2021-10-11 RX ADMIN — FENTANYL CITRATE 50 MCG: 50 INJECTION, SOLUTION INTRAMUSCULAR; INTRAVENOUS at 10:00

## 2021-10-11 RX ADMIN — HYDRALAZINE HYDROCHLORIDE 5 MG: 20 INJECTION INTRAMUSCULAR; INTRAVENOUS at 09:50

## 2021-10-11 RX ADMIN — ONDANSETRON 4 MG: 2 INJECTION INTRAMUSCULAR; INTRAVENOUS at 07:06

## 2021-10-11 RX ADMIN — KETOROLAC TROMETHAMINE 15 MG: 30 INJECTION, SOLUTION INTRAMUSCULAR; INTRAVENOUS at 11:41

## 2021-10-11 RX ADMIN — PROMETHAZINE HYDROCHLORIDE 25 MG: 25 INJECTION INTRAMUSCULAR; INTRAVENOUS at 09:22

## 2021-10-11 RX ADMIN — PHENYLEPHRINE HYDROCHLORIDE 100 MCG: 10 INJECTION INTRAVENOUS at 08:02

## 2021-10-11 RX ADMIN — LABETALOL 20 MG/4 ML (5 MG/ML) INTRAVENOUS SYRINGE 5 MG: at 09:20

## 2021-10-11 RX ADMIN — Medication 100 MG: at 07:30

## 2021-10-11 RX ADMIN — ROCURONIUM BROMIDE 50 MG: 10 INJECTION INTRAVENOUS at 07:30

## 2021-10-11 RX ADMIN — LABETALOL 20 MG/4 ML (5 MG/ML) INTRAVENOUS SYRINGE 20 MG: at 10:25

## 2021-10-11 RX ADMIN — FENTANYL CITRATE 100 MCG: 50 INJECTION INTRAMUSCULAR; INTRAVENOUS at 07:30

## 2021-10-11 RX ADMIN — KETOROLAC TROMETHAMINE 15 MG: 30 INJECTION, SOLUTION INTRAMUSCULAR; INTRAVENOUS at 18:20

## 2021-10-11 RX ADMIN — HYDRALAZINE HYDROCHLORIDE 5 MG: 20 INJECTION INTRAMUSCULAR; INTRAVENOUS at 10:05

## 2021-10-11 RX ADMIN — Medication 50 MG: at 07:59

## 2021-10-11 RX ADMIN — HYOSCYAMINE SULFATE 125 MCG: 0.12 TABLET, ORALLY DISINTEGRATING ORAL at 13:35

## 2021-10-11 RX ADMIN — PANTOPRAZOLE SODIUM 40 MG: 40 INJECTION, POWDER, FOR SOLUTION INTRAVENOUS at 18:20

## 2021-10-11 RX ADMIN — SODIUM CHLORIDE: 9 INJECTION, SOLUTION INTRAVENOUS at 08:19

## 2021-10-11 RX ADMIN — DEXAMETHASONE SODIUM PHOSPHATE 10 MG: 10 INJECTION, EMULSION INTRAMUSCULAR; INTRAVENOUS at 07:45

## 2021-10-11 RX ADMIN — SUGAMMADEX 200 MG: 100 INJECTION, SOLUTION INTRAVENOUS at 08:42

## 2021-10-11 RX ADMIN — FAMOTIDINE 20 MG: 10 INJECTION, SOLUTION INTRAVENOUS at 07:07

## 2021-10-11 RX ADMIN — MIDAZOLAM 1 MG: 1 INJECTION INTRAMUSCULAR; INTRAVENOUS at 07:29

## 2021-10-11 RX ADMIN — MORPHINE SULFATE 2 MG: 2 INJECTION, SOLUTION INTRAMUSCULAR; INTRAVENOUS at 14:10

## 2021-10-11 RX ADMIN — HYOSCYAMINE SULFATE 125 MCG: 0.12 TABLET, ORALLY DISINTEGRATING ORAL at 20:25

## 2021-10-11 RX ADMIN — Medication 10 MG: at 08:02

## 2021-10-11 RX ADMIN — PHENYLEPHRINE HYDROCHLORIDE 100 MCG: 10 INJECTION INTRAVENOUS at 07:48

## 2021-10-11 RX ADMIN — FENTANYL CITRATE 50 MCG: 50 INJECTION, SOLUTION INTRAMUSCULAR; INTRAVENOUS at 10:15

## 2021-10-11 RX ADMIN — MORPHINE SULFATE 2 MG: 2 INJECTION, SOLUTION INTRAMUSCULAR; INTRAVENOUS at 14:44

## 2021-10-11 RX ADMIN — Medication 10 MG: at 07:48

## 2021-10-11 RX ADMIN — LABETALOL 20 MG/4 ML (5 MG/ML) INTRAVENOUS SYRINGE 5 MG: at 09:30

## 2021-10-11 RX ADMIN — MORPHINE SULFATE 4 MG: 2 INJECTION, SOLUTION INTRAMUSCULAR; INTRAVENOUS at 20:25

## 2021-10-11 RX ADMIN — CIPROFLOXACIN 400 MG: 2 INJECTION, SOLUTION INTRAVENOUS at 07:37

## 2021-10-11 RX ADMIN — ONDANSETRON HYDROCHLORIDE 4 MG: 4 INJECTION, SOLUTION INTRAMUSCULAR; INTRAVENOUS at 07:45

## 2021-10-11 RX ADMIN — SODIUM CHLORIDE: 9 INJECTION, SOLUTION INTRAVENOUS at 23:20

## 2021-10-11 RX ADMIN — KETOROLAC TROMETHAMINE 15 MG: 30 INJECTION, SOLUTION INTRAMUSCULAR; INTRAVENOUS at 23:19

## 2021-10-11 ASSESSMENT — PULMONARY FUNCTION TESTS
PIF_VALUE: 24
PIF_VALUE: 2
PIF_VALUE: 26
PIF_VALUE: 19
PIF_VALUE: 27
PIF_VALUE: 2
PIF_VALUE: 27
PIF_VALUE: 18
PIF_VALUE: 28
PIF_VALUE: 26
PIF_VALUE: 24
PIF_VALUE: 23
PIF_VALUE: 27
PIF_VALUE: 29
PIF_VALUE: 26
PIF_VALUE: 19
PIF_VALUE: 28
PIF_VALUE: 27
PIF_VALUE: 1
PIF_VALUE: 27
PIF_VALUE: 21
PIF_VALUE: 1
PIF_VALUE: 23
PIF_VALUE: 21
PIF_VALUE: 27
PIF_VALUE: 18
PIF_VALUE: 20
PIF_VALUE: 27
PIF_VALUE: 27
PIF_VALUE: 19
PIF_VALUE: 27
PIF_VALUE: 20
PIF_VALUE: 26
PIF_VALUE: 27
PIF_VALUE: 29
PIF_VALUE: 19
PIF_VALUE: 1
PIF_VALUE: 28
PIF_VALUE: 1
PIF_VALUE: 27
PIF_VALUE: 23
PIF_VALUE: 28
PIF_VALUE: 23
PIF_VALUE: 3
PIF_VALUE: 22
PIF_VALUE: 28
PIF_VALUE: 27
PIF_VALUE: 30
PIF_VALUE: 2
PIF_VALUE: 24
PIF_VALUE: 27
PIF_VALUE: 27
PIF_VALUE: 18
PIF_VALUE: 21
PIF_VALUE: 19
PIF_VALUE: 19
PIF_VALUE: 26
PIF_VALUE: 28
PIF_VALUE: 26
PIF_VALUE: 27
PIF_VALUE: 19
PIF_VALUE: 28
PIF_VALUE: 24
PIF_VALUE: 29
PIF_VALUE: 27
PIF_VALUE: 3
PIF_VALUE: 4
PIF_VALUE: 22
PIF_VALUE: 4
PIF_VALUE: 27
PIF_VALUE: 24
PIF_VALUE: 24
PIF_VALUE: 28
PIF_VALUE: 1
PIF_VALUE: 1
PIF_VALUE: 24
PIF_VALUE: 24
PIF_VALUE: 19
PIF_VALUE: 27
PIF_VALUE: 1
PIF_VALUE: 28
PIF_VALUE: 18
PIF_VALUE: 28
PIF_VALUE: 23
PIF_VALUE: 28
PIF_VALUE: 28
PIF_VALUE: 25
PIF_VALUE: 28
PIF_VALUE: 18

## 2021-10-11 ASSESSMENT — PAIN - FUNCTIONAL ASSESSMENT: PAIN_FUNCTIONAL_ASSESSMENT: 0-10

## 2021-10-11 ASSESSMENT — PAIN SCALES - GENERAL
PAINLEVEL_OUTOF10: 4
PAINLEVEL_OUTOF10: 8
PAINLEVEL_OUTOF10: 5
PAINLEVEL_OUTOF10: 7
PAINLEVEL_OUTOF10: 6
PAINLEVEL_OUTOF10: 7
PAINLEVEL_OUTOF10: 6
PAINLEVEL_OUTOF10: 5
PAINLEVEL_OUTOF10: 4

## 2021-10-11 NOTE — PROGRESS NOTES
0859  Pt. Unresponsive on adm. To pacu. Abdominal sites x 6 with steristrips and bandaids intact and dry. Abdominal binder intact and ice applied. 1880  chemstick 189.  0910  Pt. Responds easily to name. Pt. Oriented. 0912  o2 discontinued. 0920  Pt. Medicated for elevated blood pressure. 3149  Pt. Medicated for complaints of nausea. 0930  Pt. Medicated for elevated blood pressure. 7074  Continue to medicate for elevated blood pressure. 1000  Pt. Medicated for pain. 1005  Pt. Asleep with sonorous respers. 1025  Blood pressure remains elevated. Dr. Nisreen Abraham notified. 1050  pacu criteria met. Transfer to [facility].

## 2021-10-11 NOTE — INTERVAL H&P NOTE
Update History & Physical    The patient's History and Physical was reviewed with the patient and I examined the patient. There was no change. The surgical site was confirmed by the patient and me. Plan: The risks, benefits, expected outcome, and alternative to the recommended procedure have been discussed with the patient. Patient understands and wants to proceed with the procedure. The patient was counseled at length about the risks of manju Covid-19 during their perioperative period and any recovery window from their procedure. The patient was made aware that manju Covid-19  may worsen their prognosis for recovering from their procedure  and lend to a higher morbidity and/or mortality risk. All material risks, benefits, and reasonable alternatives including postponing the procedure were discussed. The patient does wish to proceed with the procedure at this time.     Electronically signed by Jeffery Valentin MD on 10/11/2021 at 9:14 AM

## 2021-10-11 NOTE — OP NOTE
well throughout the case. All pressure points were padded. She was on preoperative antibiotics. Bilateral lower extremity sequential  compression devices were placed prior to incision. Her abdomen and pelvis  were prepped and draped in the usual sterile standard fashion. A time-out  occurred prior to the operation, which not only identified the patient but  also the planned procedure to be performed. At the end of the time-out,  there were no questions or concerns. I began the operation first by making a small transverse incision just  above and to the right of the umbilicus. Using low-flow insufflation and  the OptiView, the abdomen was entered into without difficulty. The  intra-abdominal cavity was insufflated to a pressure of approximately 15  mmHg with carbon dioxide gas. The patient tolerated the insufflation well. Three separate 8-mm trocars were placed in their standard location under  direct vision. A 5-mm trocar was placed in the far right lateral abdominal  region under direct vision. Liver retractor was brought in and placed  without difficult under direct vision. The 11 mm was then replaced by  12-mm robotic trocar for the stapler and specimen extraction. The patient was then  placed in reverse Trendelenburg. Robot brought in and docked. Instruments  were placed under direct vision. Once everything was aligned and in order,  I then unscrubbed and went back to the console. I began the operation by  first evaluating the hiatus. There appeared to be no significant hiatal  hernia. The lesser sac was entered into using the vessel sealer going  through the mesentery there on the greater curvature of the stomach. Mesentery here was then unzipped distally to about 5 to 6 cm proximal from the  pylorus. It was then unzipped proximal up and around the fundus to the  left ania. Short gastric vessels were taken down. Spleen preserved. Hemostasis adequate.   Here, it was confirmed there was no significant  hiatal hernia. After it was assured that the fundus and the upper stomach  was completely mobilized, the bougie 36-[place] was then placed under direct  vision and positioned with the help of anesthesia. This was then placed to suction. First firing was  a 61 robotic black reinforced SEAMGUARD cartridge. Care was taken to hug the bougie  but also not to stricture down the angle of the stomach. There were several firings of the 60-mm robotic green reinforced SEAMGUARD  cartridges. All of these hugged the bougie. Last firing was a 60 mm robotic green reinforced SEAMGUARD  cartridge. Here, care was taken to ensure taking all of  the excess fundus, but also not to stricture down or encroach on the GE  junction. Staple line was then tested under irrigation with air  insufflation through the bougie and clamped distally. No air bubbles or  any kind of concern for leak was identified. Irrigation was then removed. Bougie taken off of suction and removed under direct visualization. 0 Ethibond suture was then placed through the  gastric remnant and brought up to the 12-mm trocar site. Instruments were  removed. Robot undocked. I then scrubbed back into the case. Gastric  remnant was removed and sent to Pathology for permanent. Using a Storz  suture passer and 0 Vicryl suture, this was placed through the fascia there  around the 12-mm trocar site. This was tied and at the completion of this,  there were no fascial defects. Liver retractor removed. Other trocars  were then removed under direct vision. Hemostasis was adequate. The  patient tolerated desufflation well. Vicryl suture 4-0 was used in a  subcuticular fashion in all the open incisions for skin closure. Closed  incisions were then cleaned, dried, and Steri-Strips applied. Marcaine  0.5% with epinephrine was used for local anesthetic. The patient tolerated  the injection of local anesthetic without difficulty.   Sponge, needle and  instrumentation counts were correct at the end of the procedure. The  patient tolerated the procedure well with no apparent complications and  only about 10 mL of blood loss. She was later brought out of  general anesthesia and then transferred to postanesthesia care unit in  stable condition. Ronald Lopez M.D.  FACS  Electronically signed 10/11/2021 at 9:18 AM

## 2021-10-11 NOTE — PROGRESS NOTES
Pt waking up. States her nausea is better. States her abdominal discomfort is less. Dozing off and on. Mother at bedside.

## 2021-10-11 NOTE — PROGRESS NOTES
Pt repositioned in bed with pt assistance. Pt states she is feeling better , just drowsy.  and pt mother remain at bedside.

## 2021-10-11 NOTE — ANESTHESIA POSTPROCEDURE EVALUATION
Department of Anesthesiology  Postprocedure Note    Patient: Elsa Flores  MRN: [id number]  YOB: 1975  Date of evaluation: 10/11/2021  Time:  11:54 AM     Procedure Summary     Date: 10/11/21 Room / Location: [address] [facility]    Anesthesia Start: 0726 Anesthesia Stop: 0902    Procedure: GASTRECTOMY SLEEVE LAPAROSCOPIC ROBOTIC (N/A Abdomen) Diagnosis: (OBESITY)    Surgeons: Maryann Harrison MD Responsible Provider: Alexus White MD    Anesthesia Type: general ASA Status: 3          Anesthesia Type: general    Maira Phase I: Maira Score: 9    Maira Phase II:      Last vitals: Reviewed and per EMR flowsheets.        Anesthesia Post Evaluation    Patient location during evaluation: PACU  Patient participation: complete - patient participated  Level of consciousness: awake and alert  Airway patency: patent  Nausea & Vomiting: no nausea  Complications: no  Cardiovascular status: blood pressure returned to baseline and hemodynamically stable  Respiratory status: acceptable and spontaneous ventilation  Hydration status: euvolemic

## 2021-10-11 NOTE — BRIEF OP NOTE
Brief Postoperative Note      Patient: Jammie Taylor  YOB: 1975  MRN: [id number]    Date of Procedure: 10/11/2021    Pre-Op Diagnosis: 1. MORBID OBESITY  2. BMI 50  3.  DM    Post-Op Diagnosis: Same       Procedure(s):  GASTRECTOMY SLEEVE LAPAROSCOPIC ROBOTIC    Surgeon(s):  Deedee Villarreal MD    Assistant:  First Assistant: Nena Jhaveri RN    Anesthesia: General/Local    Estimated Blood Loss (mL): 10 ml    Complications: None    Specimens:   ID Type Source Tests Collected by Time Destination   A : GASTRIC REMNANT Tissue Stomach SURGICAL PATHOLOGY Deedee Villarreal MD 10/11/2021 1636        Implants:  * No implants in log *      Drains: * No LDAs found *    Findings: as above - see op note for details    Electronically signed by Deedee Villarreal MD on 10/11/2021 at 9:14 AM

## 2021-10-11 NOTE — ANESTHESIA PRE PROCEDURE
Department of Anesthesiology  Preprocedure Note       Name:  Bekah Hong   Age:  42 y.o.  :  1975                                          MRN:  [id number]         Date:  10/11/2021      Surgeon: Linwood Rodrigues):  Michelle Zendejas MD    Procedure: Procedure(s):  GASTRECTOMY SLEEVE LAPAROSCOPIC ROBOTIC    Medications prior to admission:   Prior to Admission medications    Medication Sig Start Date End Date Taking?  Authorizing Provider   Docusate Sodium 100 MG TABS Take 100 mg by mouth 2 times daily Take as needed to prevent constipation 21  Yes Michelle Zendejas MD   omeprazole (PRILOSEC) 40 MG delayed release capsule Take 1 capsule by mouth every morning (before breakfast) Open capsule post-op 21  Yes Michelle Zendejas MD   atorvastatin (LIPITOR) 20 MG tablet TAKE 1 TABLET BY MOUTH ONE TIME A DAY 21  Yes THADDEUS Griffin CNP   FARXIGA 5 MG tablet TAKE 1 TABLET BY MOUTH EVERY MORNING 21  Yes THADDEUS Griffin CNP   FLUoxetine (PROZAC) 20 MG capsule Take 3 capsules by mouth daily 21  Yes THADDEUS Griffin CNP   metoclopramide (REGLAN) 10 MG tablet Take 10 mg by mouth every 6 hours as needed    Historical Provider, MD   Misc Natural Products (CRANBERRY/PROBIOTIC) TABS Take 1 tablet by mouth daily    Historical Provider, MD   vitamin D 25 MCG (1000 UT) CAPS Take 1 capsule by mouth daily     Historical Provider, MD   Multiple Vitamins-Minerals (MULTIVITAMIN ADULT PO) Take 1 tablet by mouth daily     Historical Provider, MD   lisinopril-hydroCHLOROthiazide (PRINZIDE;ZESTORETIC) 20-12.5 MG per tablet Take 1 tablet by mouth daily 20   THADDEUS Griffin CNP       Current medications:    Current Facility-Administered Medications   Medication Dose Route Frequency Provider Last Rate Last Admin    sodium chloride flush 0.9 % injection 5-40 mL  5-40 mL IntraVENous 2 times per day Michelle Zendejas MD        sodium chloride flush 0.9 % injection 5-40 mL  5-40 mL IntraVENous PRN Sharonda Carnes MD        0.9 % sodium chloride infusion  25 mL IntraVENous PRN Sharonda Carnes MD        cefOXitin (MEFOXIN) 3,000 mg in dextrose 5 % 50 mL IVPB  3,000 mg IntraVENous On Call to [address] MD        ondansetron [facility]) injection 4 mg  4 mg IntraVENous Once Sharonda Carnes MD        famotidine (PEPCID) injection 20 mg  20 mg IntraVENous Once Sharonda Carnes MD        scopolamine (TRANSDERM-SCOP) transdermal patch 1 patch  1 patch TransDERmal Q72H Sharonda Carnes MD   1 patch at 10/11/21 0651    cefOXitin (MEFOXIN) 2 g injection                Allergies:  No Known Allergies    Problem List:    Patient Active Problem List   Diagnosis Code    Type 2 diabetes mellitus without complication, without long-term current use of insulin ([facility] E11.9    Essential hypertension I10    Morbid obesity with BMI of 50.0-59.9, adult ([facility] E66.01, Z68.43    Recurrent major depressive disorder, in full remission ([facility] F33.42       Past Medical History:        Diagnosis Date    Back pain     Body mass index 50.0-59.9, adult ([facility] 06/23/2021    Depression     Diabetes mellitus ([facility]     GERD (gastroesophageal reflux disease)     Hypercholesteremia     Hypertension     UTI (urinary tract infection)        Past Surgical History:        Procedure Laterality Date    CHOLECYSTECTOMY      COLONOSCOPY      ENDOMETRIAL ABLATION      HYSTERECTOMY ABDOMINAL N/A 12/12/2017    ROBOTIC TOTAL HYSTERECTOMY WITH BILATERAL SALPINGECTOMY performed by Melissa Saenz MD at [facility]      vocal cord mass removed    OVARIAN CYST REMOVAL      UPPER GASTROINTESTINAL ENDOSCOPY N/A 2/9/2021    EGD BIOPSY performed by Royann Epley, MD at [facility]       Social History:    Social History     Tobacco Use    Smoking status: Never Smoker    Smokeless tobacco: Never Used   Substance Use Topics    Alcohol use:  No Counseling given: Not Answered      Vital Signs (Current):   Vitals:    10/11/21 0631   BP: (!) 157/89   Pulse: 87   Resp: 17   Temp: 96.7 °F (35.9 °C)   TempSrc: Temporal   SpO2: 95%   Weight: (!) 306 lb 6.4 oz (139 kg)   Height: 5' 5\" (1.651 m)                                              BP Readings from Last 3 Encounters:   10/11/21 (!) 157/89   07/14/21 132/70   05/14/21 124/76       NPO Status: Time of last liquid consumption: 2000                        Time of last solid consumption: 2000                        Date of last liquid consumption: 10/10/21                        Date of last solid food consumption: 10/10/21    BMI:   Wt Readings from Last 3 Encounters:   10/11/21 (!) 306 lb 6.4 oz (139 kg)   08/16/21 (!) 309 lb 9.6 oz (140.4 kg)   07/14/21 (!) 311 lb (141.1 kg)     Body mass index is 50.99 kg/m². CBC:   Lab Results   Component Value Date    WBC 12.0 10/09/2021    RBC 5.13 10/09/2021    HGB 15.5 10/09/2021    HCT 46.3 10/09/2021    MCV 90.3 10/09/2021    RDW 13.1 12/30/2017     10/09/2021       CMP:   Lab Results   Component Value Date     10/09/2021    K 4.2 10/09/2021     10/09/2021    CO2 27 10/09/2021    BUN 14 10/09/2021    CREATININE 0.7 10/09/2021    LABGLOM >90 10/09/2021    GLUCOSE 155 10/09/2021    PROT 7.1 11/14/2020    CALCIUM 9.0 10/09/2021    BILITOT 0.6 11/14/2020    ALKPHOS 76 11/14/2020    AST 27 11/14/2020    ALT 49 11/14/2020       POC Tests: No results for input(s): POCGLU, POCNA, POCK, POCCL, POCBUN, POCHEMO, POCHCT in the last 72 hours. Coags:   Lab Results   Component Value Date    INR 0.90 11/14/2020    APTT 30.6 11/14/2020       HCG (If Applicable):   Lab Results   Component Value Date    PREGSERUM NEGATIVE 06/20/2017        ABGs: No results found for: PHART, PO2ART, XHF0SXD, IQO4OIN, BEART, L0AKQIZK     Type & Screen (If Applicable):  Lab Results   Component Value Date    LABRH POS 12/12/2017       Drug/Infectious Status (If Applicable):   No results found for: HIV, HEPCAB    COVID-19 Screening (If Applicable):   Lab Results   Component Value Date    COVID19 Detected 11/23/2020           Anesthesia Evaluation   no history of anesthetic complications:   Airway: Mallampati: II  TM distance: >3 FB   Neck ROM: full  Mouth opening: > = 3 FB Dental:          Pulmonary:Negative Pulmonary ROS and normal exam              Patient did not smoke on day of surgery. Cardiovascular:  Exercise tolerance: good (>4 METS),   (+) hypertension:,                   Neuro/Psych:   (+) psychiatric history:            GI/Hepatic/Renal:   (+) GERD:, morbid obesity          Endo/Other:    (+) Diabetes, . Pt had no PAT visit       Abdominal:   (+) obese,           Vascular: negative vascular ROS. Other Findings:             Anesthesia Plan      general     ASA 3       Induction: intravenous. MIPS: Postoperative opioids intended and Prophylactic antiemetics administered. Anesthetic plan and risks discussed with patient. Plan discussed with CRNA.                   Carissa Coronado MD   10/11/2021

## 2021-10-12 ENCOUNTER — APPOINTMENT (OUTPATIENT)
Dept: GENERAL RADIOLOGY | Age: 46
DRG: 621 | End: 2021-10-12
Attending: SURGERY
Payer: COMMERCIAL

## 2021-10-12 LAB
ANION GAP SERPL CALCULATED.3IONS-SCNC: 15 MEQ/L (ref 8–16)
BUN BLDV-MCNC: 16 MG/DL (ref 7–22)
CALCIUM SERPL-MCNC: 8.6 MG/DL (ref 8.5–10.5)
CHLORIDE BLD-SCNC: 103 MEQ/L (ref 98–111)
CO2: 22 MEQ/L (ref 23–33)
CREAT SERPL-MCNC: 0.8 MG/DL (ref 0.4–1.2)
GFR SERPL CREATININE-BSD FRML MDRD: 78 ML/MIN/1.73M2
GLUCOSE BLD-MCNC: 119 MG/DL (ref 70–108)
GLUCOSE BLD-MCNC: 124 MG/DL (ref 70–108)
GLUCOSE BLD-MCNC: 127 MG/DL (ref 70–108)
GLUCOSE BLD-MCNC: 148 MG/DL (ref 70–108)
GLUCOSE BLD-MCNC: 167 MG/DL (ref 70–108)
HCT VFR BLD CALC: 39.3 % (ref 37–47)
HEMOGLOBIN: 12.8 GM/DL (ref 12–16)
POTASSIUM REFLEX MAGNESIUM: 4.1 MEQ/L (ref 3.5–5.2)
SODIUM BLD-SCNC: 140 MEQ/L (ref 135–145)

## 2021-10-12 PROCEDURE — 2500000003 HC RX 250 WO HCPCS: Performed by: SURGERY

## 2021-10-12 PROCEDURE — 6370000000 HC RX 637 (ALT 250 FOR IP): Performed by: SURGERY

## 2021-10-12 PROCEDURE — 82948 REAGENT STRIP/BLOOD GLUCOSE: CPT

## 2021-10-12 PROCEDURE — 6360000004 HC RX CONTRAST MEDICATION: Performed by: SURGERY

## 2021-10-12 PROCEDURE — 80048 BASIC METABOLIC PNL TOTAL CA: CPT

## 2021-10-12 PROCEDURE — 85014 HEMATOCRIT: CPT

## 2021-10-12 PROCEDURE — 36415 COLL VENOUS BLD VENIPUNCTURE: CPT

## 2021-10-12 PROCEDURE — 51798 US URINE CAPACITY MEASURE: CPT

## 2021-10-12 PROCEDURE — C9113 INJ PANTOPRAZOLE SODIUM, VIA: HCPCS | Performed by: SURGERY

## 2021-10-12 PROCEDURE — 2580000003 HC RX 258: Performed by: SURGERY

## 2021-10-12 PROCEDURE — 6360000002 HC RX W HCPCS: Performed by: SURGERY

## 2021-10-12 PROCEDURE — 99024 POSTOP FOLLOW-UP VISIT: CPT | Performed by: NURSE PRACTITIONER

## 2021-10-12 PROCEDURE — 1200000000 HC SEMI PRIVATE

## 2021-10-12 PROCEDURE — 6370000000 HC RX 637 (ALT 250 FOR IP): Performed by: NURSE PRACTITIONER

## 2021-10-12 PROCEDURE — 74240 X-RAY XM UPR GI TRC 1CNTRST: CPT

## 2021-10-12 PROCEDURE — 85018 HEMOGLOBIN: CPT

## 2021-10-12 RX ORDER — FLUOXETINE HYDROCHLORIDE 20 MG/1
60 CAPSULE ORAL DAILY
Status: DISCONTINUED | OUTPATIENT
Start: 2021-10-12 | End: 2021-10-13 | Stop reason: HOSPADM

## 2021-10-12 RX ORDER — SIMETHICONE 80 MG
80 TABLET,CHEWABLE ORAL 4 TIMES DAILY PRN
Status: DISCONTINUED | OUTPATIENT
Start: 2021-10-12 | End: 2021-10-13 | Stop reason: HOSPADM

## 2021-10-12 RX ORDER — OXYCODONE HYDROCHLORIDE 5 MG/1
5 TABLET ORAL EVERY 4 HOURS PRN
Status: DISCONTINUED | OUTPATIENT
Start: 2021-10-12 | End: 2021-10-13 | Stop reason: HOSPADM

## 2021-10-12 RX ADMIN — KETOROLAC TROMETHAMINE 15 MG: 30 INJECTION, SOLUTION INTRAMUSCULAR; INTRAVENOUS at 06:53

## 2021-10-12 RX ADMIN — SODIUM CHLORIDE: 9 INJECTION, SOLUTION INTRAVENOUS at 15:59

## 2021-10-12 RX ADMIN — MORPHINE SULFATE 4 MG: 2 INJECTION, SOLUTION INTRAMUSCULAR; INTRAVENOUS at 05:03

## 2021-10-12 RX ADMIN — FLUOXETINE HYDROCHLORIDE 60 MG: 20 CAPSULE ORAL at 13:08

## 2021-10-12 RX ADMIN — LISINOPRIL 20 MG: 20 TABLET ORAL at 10:09

## 2021-10-12 RX ADMIN — HYOSCYAMINE SULFATE 125 MCG: 0.12 TABLET, ORALLY DISINTEGRATING ORAL at 01:43

## 2021-10-12 RX ADMIN — KETOROLAC TROMETHAMINE 15 MG: 30 INJECTION, SOLUTION INTRAMUSCULAR; INTRAVENOUS at 10:08

## 2021-10-12 RX ADMIN — ENOXAPARIN SODIUM 40 MG: 40 INJECTION SUBCUTANEOUS at 20:13

## 2021-10-12 RX ADMIN — IOHEXOL 50 ML: 240 INJECTION, SOLUTION INTRATHECAL; INTRAVASCULAR; INTRAVENOUS; ORAL at 08:09

## 2021-10-12 RX ADMIN — SODIUM CHLORIDE: 9 INJECTION, SOLUTION INTRAVENOUS at 06:53

## 2021-10-12 RX ADMIN — ONDANSETRON 4 MG: 2 INJECTION INTRAMUSCULAR; INTRAVENOUS at 18:58

## 2021-10-12 RX ADMIN — KETOROLAC TROMETHAMINE 15 MG: 30 INJECTION, SOLUTION INTRAMUSCULAR; INTRAVENOUS at 18:58

## 2021-10-12 RX ADMIN — HYDROCHLOROTHIAZIDE 12.5 MG: 25 TABLET ORAL at 10:09

## 2021-10-12 RX ADMIN — ONDANSETRON 4 MG: 2 INJECTION INTRAMUSCULAR; INTRAVENOUS at 11:36

## 2021-10-12 RX ADMIN — KETOROLAC TROMETHAMINE 15 MG: 30 INJECTION, SOLUTION INTRAMUSCULAR; INTRAVENOUS at 23:34

## 2021-10-12 RX ADMIN — BARIUM SULFATE 50 ML: 0.81 POWDER, FOR SUSPENSION ORAL at 08:09

## 2021-10-12 RX ADMIN — PANTOPRAZOLE SODIUM 40 MG: 40 INJECTION, POWDER, FOR SOLUTION INTRAVENOUS at 10:08

## 2021-10-12 RX ADMIN — Medication 80 MG: at 10:09

## 2021-10-12 RX ADMIN — SODIUM CHLORIDE, PRESERVATIVE FREE 10 ML: 5 INJECTION INTRAVENOUS at 10:08

## 2021-10-12 RX ADMIN — ENOXAPARIN SODIUM 40 MG: 40 INJECTION SUBCUTANEOUS at 10:08

## 2021-10-12 ASSESSMENT — PAIN SCALES - GENERAL
PAINLEVEL_OUTOF10: 5
PAINLEVEL_OUTOF10: 3
PAINLEVEL_OUTOF10: 4
PAINLEVEL_OUTOF10: 0
PAINLEVEL_OUTOF10: 3
PAINLEVEL_OUTOF10: 3
PAINLEVEL_OUTOF10: 8
PAINLEVEL_OUTOF10: 6
PAINLEVEL_OUTOF10: 3
PAINLEVEL_OUTOF10: 4

## 2021-10-12 ASSESSMENT — PAIN DESCRIPTION - DESCRIPTORS: DESCRIPTORS: PRESSURE

## 2021-10-12 ASSESSMENT — PAIN DESCRIPTION - ORIENTATION: ORIENTATION: MID

## 2021-10-12 ASSESSMENT — PAIN DESCRIPTION - DIRECTION: RADIATING_TOWARDS: LEFT SHOULDER

## 2021-10-12 ASSESSMENT — PAIN DESCRIPTION - ONSET: ONSET: ON-GOING

## 2021-10-12 ASSESSMENT — PAIN DESCRIPTION - PROGRESSION: CLINICAL_PROGRESSION: NOT CHANGED

## 2021-10-12 ASSESSMENT — PAIN - FUNCTIONAL ASSESSMENT: PAIN_FUNCTIONAL_ASSESSMENT: ACTIVITIES ARE NOT PREVENTED

## 2021-10-12 ASSESSMENT — PAIN DESCRIPTION - LOCATION: LOCATION: ABDOMEN

## 2021-10-12 ASSESSMENT — PAIN DESCRIPTION - PAIN TYPE: TYPE: ACUTE PAIN;SURGICAL PAIN

## 2021-10-12 ASSESSMENT — PAIN DESCRIPTION - FREQUENCY: FREQUENCY: CONTINUOUS

## 2021-10-12 NOTE — PROGRESS NOTES
[facility] - Dr. Lanie Ramirez  Postoperative Bariatric Progress Note    Pt Name: Jennifer Villarreal Record Number: [id number]  Date of Birth 1975   Today's Date: 10/12/2021    ASSESSMENT   1. POD # 1 S/p robotic assisted sleeve gastrectomy  2. Morbid obesity (BMI 50)  3. Diabetes Mellitus   has a past medical history of Back pain, Body mass index 50.0-59.9, adult ([facility] Depression, Diabetes mellitus ([facility] GERD (gastroesophageal reflux disease), Hypercholesteremia, Hypertension, and UTI (urinary tract infection). PLAN   1. Upper GI reviewed. No evidence of leak or obstruction. 2. Okay for Mylicon PRN  3. Okay to advance to sugar free clear liquids. Strict I&Os. 4. IV hydration  5. DVT & GI prophylaxis  6. Chems ACHS with SS as needed  7. Routine incisional care  8. Pain & nausea control  9. Resume home medications  10. Ambulation every 1-2 hours   11. Labs reviewed  12. Clinically, looks pretty good. Home later today if doing well with liquids. Discharge instructions discussed. Questions answered. SUBJECTIVE   Patient was stable overnight. No fevers. Up moving well on her own. More gas pain than surgical pain. Chart reviewed. Updated by nursing staff. Taking Oscimin as needed. No chest pain. No dysphagia. Minimal nausea. No vomiting. (-) belching, flatus or BM. Tolerating BARIATRIC DIET; Bariatric Clear Liquid diet. Pain controlled. Urinating without issues.    CURRENT MEDICATIONS   Scheduled Meds:   ketorolac  15 mg IntraVENous Q6H    insulin lispro  0-6 Units SubCUTAneous TID WC    insulin lispro  0-3 Units SubCUTAneous Nightly    sodium chloride flush  10 mL IntraVENous 2 times per day    ondansetron  4 mg IntraVENous Q6H    [START ON 10/14/2021] scopolamine  1 patch TransDERmal Q72H    pantoprazole  40 mg IntraVENous Daily    And    sodium chloride (PF)  10 mL IntraVENous Daily    enoxaparin  40 mg SubCUTAneous 2 times per day    lisinopril  20 mg Oral Daily    And    hydroCHLOROthiazide  12.5 mg Oral Daily     Continuous Infusions:   sodium chloride      sodium chloride 125 mL/hr at 10/12/21 0653    dextrose       PRN Meds:.oxyCODONE, simethicone, hyoscyamine, sodium chloride flush, sodium chloride, morphine **OR** morphine, promethazine, promethazine, metoclopramide, glucose, dextrose, glucagon (rDNA), dextrose  OBJECTIVE   CURRENT VITALS:  height is 5' 5\" (1.651 m) and weight is 306 lb 6.4 oz (139 kg) (abnormal). Her oral temperature is 98.1 °F (36.7 °C). Her blood pressure is 136/86 and her pulse is 91. Her respiration is 16 and oxygen saturation is 97%.    Temperature Range (24h):Temp: 98.1 °F (36.7 °C) Temp  Av °F (36.7 °C)  Min: 96.6 °F (35.9 °C)  Max: 98.5 °F (36.9 °C)  BP Range (70Y): Systolic (03BDS), IVK:373 , Min:112 , SZF:687     Diastolic (50ENQ), AF, Min:58, Max:87    Pulse Range (24h): Pulse  Av.8  Min: 69  Max: 102  Respiration Range (24h): Resp  Av.1  Min: 16  Max: 22  Current Pulse Ox (24h):  SpO2: 97 %  Pulse Ox Range (24h):  SpO2  Av.1 %  Min: 93 %  Max: 97 %  Oxygen Amount and Delivery: O2 Flow Rate (L/min): 6 L/min  Incentive Spirometry Tx:            GENERAL: alert, no distress, flushed  LUNGS: clear to ausculation, without wheezes, rales or rhonci  HEART: normal rate and regular rhythm  ABDOMEN: obese, non-distended, soft, incisional tenderness, bowel sounds present   INCISION: healing well, no significant drainage, no significant erythema  EXTERMITY: no cyanosis, clubbing or edema  In: 1442.2 [I.V.:1442.2]  Out: -   Date 10/12/21 0000 - 10/12/21 2359   Shift 3802-5727 2870-8879 2188-6834 24 Hour Total   INTAKE   I.V.(mL/kg/hr) 879(0.8)   879   Shift Total(mL/kg) 879(6.3)   879(6.3)   OUTPUT   Shift Total(mL/kg)       Weight (kg) 139 139 139 139     LABS     Recent Labs     10/12/21  0614   HGB 12.8   HCT 39.3      K 4.1      CO2 22*   BUN 16   CREATININE 0.8   CALCIUM 8.6      RADIOLOGY PROCEDURE: FL UGI       CLINICAL INFORMATION: Obesity, status post sleeve gastrectomy.       TECHNIQUE: A preliminary radiograph was obtained. Following the oral administration of Omnipaque 240 and thin barium, the anatomy of the distal esophagus, stomach and duodenum were evaluated in a limited upper GI examination. 10 images were obtained. Total fluoroscopy time 0.9 minutes.       COMPARISON: None       FINDINGS: Preliminary radiograph demonstrates a nonobstructive bowel gas pattern. There is surgical suture material in the left upper abdomen. Surgical clips are present in the right upper abdomen. Osseous structures are unremarkable.       There is no stenosis or large hiatal hernia in the distal esophagus. There are surgical changes in the stomach of prior sleeve gastrectomy. There is normal transit of contrast through the stomach into the duodenum. There is no extravasation of contrast    to suggest a leak. The duodenal sweep is normal.           Impression       No evidence of obstruction or leak following sleeve gastrectomy.       Final report electronically signed by Dr. Salvador Maria on 10/12/2021 8:32 AM       Electronically signed by THADDEUS Payton CNP on 10/12/2021 at 11:28 AM     Patient seen and examined independently by me early this AM. Above discussed and I agree with Chiquita Buenrostro CNP. See my additional comments below for updated orders and plan. Labs, cultures, and radiographs where available were reviewed. I discussed patient concerns with the patient's nurse and instructions were given. Please see our orders for the updated patient care plan. -Upper GI okay. Clear liquids. Ambulate. IV fluid hydration. DVT prophylaxis. Incisional/wound care. Pain and nausea control. Home medications. Home later today/tomorrow morning depending how she progresses throughout the day.     Electronically signed by Terrie Nuñez MD on 10/12/21 at 5:31 PM EDT

## 2021-10-12 NOTE — CARE COORDINATION
10/12/21, 10:08 AM EDT  DISCHARGE PLANNING EVALUATION:    Jeovany Gregorio       Admitted: 10/11/2021/ [address] day: 1   [facility] Reason for admit: Morbid obesity ([facility] [E66.01]   PMH:  has a past medical history of Back pain, Body mass index 50.0-59.9, adult ([facility] Depression, Diabetes mellitus ([facility] GERD (gastroesophageal reflux disease), Hypercholesteremia, Hypertension, and UTI (urinary tract infection). Procedure: 10/11/21 surgery by Dr Kennedy Rodriguez: Debra Andersen  Barriers to Discharge:  FL UGI this morning. CL bariatric diet. Strict I&O. Pain management. Ice to abdomen. Abdominal binder. Ambulate in hallway every 2 hours with assist.   PCP: THADDEUS Mcgraw CNP  Readmission Risk Score: 6%    Patient Goals/Plan/Treatment Preferences: Joseph Ring said that she is planning to go home with her  later today. Patient informed of order for [facility] post-op home health nursing. Specially trained nurses from [facility] will evaluate you and provide additional program education. I called referral to [facility]. Transportation/Food Security/Housekeeping Addressed:  No issues identified. 10/12/21, 10:10 AM EDT    Patient goals/plan/ treatment preferences discussed by  and . Patient goals/plan/ treatment preferences reviewed with patient/ family. Patient/ family verbalize understanding of discharge plan and are in agreement with goal/plan/treatment preferences. Understanding was demonstrated using the teach back method. AVS provided by RN at time of discharge, which includes all necessary medical information pertaining to the patients current course of illness, treatment, post-discharge goals of care, and treatment preferences.     Services After Discharge  Services At/After Discharge: Nursing Services

## 2021-10-13 VITALS
RESPIRATION RATE: 18 BRPM | WEIGHT: 293 LBS | DIASTOLIC BLOOD PRESSURE: 59 MMHG | OXYGEN SATURATION: 95 % | BODY MASS INDEX: 48.82 KG/M2 | HEIGHT: 65 IN | HEART RATE: 80 BPM | TEMPERATURE: 98.7 F | SYSTOLIC BLOOD PRESSURE: 121 MMHG

## 2021-10-13 LAB
GLUCOSE BLD-MCNC: 112 MG/DL (ref 70–108)
GLUCOSE BLD-MCNC: 128 MG/DL (ref 70–108)

## 2021-10-13 PROCEDURE — 6370000000 HC RX 637 (ALT 250 FOR IP): Performed by: NURSE PRACTITIONER

## 2021-10-13 PROCEDURE — 6370000000 HC RX 637 (ALT 250 FOR IP): Performed by: SURGERY

## 2021-10-13 PROCEDURE — 82948 REAGENT STRIP/BLOOD GLUCOSE: CPT

## 2021-10-13 PROCEDURE — 99024 POSTOP FOLLOW-UP VISIT: CPT | Performed by: NURSE PRACTITIONER

## 2021-10-13 PROCEDURE — 2580000003 HC RX 258: Performed by: SURGERY

## 2021-10-13 PROCEDURE — C9113 INJ PANTOPRAZOLE SODIUM, VIA: HCPCS | Performed by: SURGERY

## 2021-10-13 PROCEDURE — 6360000002 HC RX W HCPCS: Performed by: SURGERY

## 2021-10-13 RX ORDER — HYOSCYAMINE SULFATE 0.125 MG
125 TABLET,DISINTEGRATING ORAL EVERY 4 HOURS PRN
Qty: 30 TABLET | Refills: 0 | Status: SHIPPED | OUTPATIENT
Start: 2021-10-13 | End: 2021-10-19

## 2021-10-13 RX ORDER — KETOROLAC TROMETHAMINE 10 MG/1
10 TABLET, FILM COATED ORAL EVERY 6 HOURS PRN
Qty: 20 TABLET | Refills: 0 | Status: SHIPPED | OUTPATIENT
Start: 2021-10-13 | End: 2021-10-19

## 2021-10-13 RX ORDER — OXYCODONE HYDROCHLORIDE 5 MG/1
5 TABLET ORAL EVERY 6 HOURS PRN
Qty: 20 TABLET | Refills: 0 | Status: SHIPPED | OUTPATIENT
Start: 2021-10-13 | End: 2021-10-19

## 2021-10-13 RX ADMIN — OXYCODONE 5 MG: 5 TABLET ORAL at 10:01

## 2021-10-13 RX ADMIN — HYDROCHLOROTHIAZIDE 12.5 MG: 25 TABLET ORAL at 09:13

## 2021-10-13 RX ADMIN — ONDANSETRON 4 MG: 2 INJECTION INTRAMUSCULAR; INTRAVENOUS at 01:59

## 2021-10-13 RX ADMIN — KETOROLAC TROMETHAMINE 15 MG: 30 INJECTION, SOLUTION INTRAMUSCULAR; INTRAVENOUS at 05:46

## 2021-10-13 RX ADMIN — LISINOPRIL 20 MG: 20 TABLET ORAL at 09:12

## 2021-10-13 RX ADMIN — Medication 80 MG: at 09:59

## 2021-10-13 RX ADMIN — SODIUM CHLORIDE: 9 INJECTION, SOLUTION INTRAVENOUS at 04:33

## 2021-10-13 RX ADMIN — ONDANSETRON 4 MG: 2 INJECTION INTRAMUSCULAR; INTRAVENOUS at 07:57

## 2021-10-13 RX ADMIN — PANTOPRAZOLE SODIUM 40 MG: 40 INJECTION, POWDER, FOR SOLUTION INTRAVENOUS at 09:44

## 2021-10-13 RX ADMIN — Medication 80 MG: at 01:59

## 2021-10-13 RX ADMIN — FLUOXETINE HYDROCHLORIDE 60 MG: 20 CAPSULE ORAL at 09:12

## 2021-10-13 RX ADMIN — ENOXAPARIN SODIUM 40 MG: 40 INJECTION SUBCUTANEOUS at 09:44

## 2021-10-13 ASSESSMENT — PAIN SCALES - GENERAL
PAINLEVEL_OUTOF10: 4
PAINLEVEL_OUTOF10: 3
PAINLEVEL_OUTOF10: 1
PAINLEVEL_OUTOF10: 1

## 2021-10-13 NOTE — PROGRESS NOTES
Patient discharged to home with . Patient left with all belongings, AVS. Patient received medications from OP pharmacy. Pt instructed to continue with clear liquids, ambulating. Pt instructed on continued use of chg soap for infection prevention.  All questions answered at this time

## 2021-10-13 NOTE — CARE COORDINATION
10/13/21, 11:28 AM EDT    Patient goals/plan/ treatment preferences discussed by  and . Patient goals/plan/ treatment preferences reviewed with patient/ family. Patient/ family verbalize understanding of discharge plan and are in agreement with goal/plan/treatment preferences. Understanding was demonstrated using the teach back method. AVS provided by RN at time of discharge, which includes all necessary medical information pertaining to the patients current course of illness, treatment, post-discharge goals of care, and treatment preferences.     Services After Discharge  Services At/After Discharge: Nursing Services

## 2021-10-13 NOTE — PROGRESS NOTES
chloride      sodium chloride 125 mL/hr at 10/13/21 0433    dextrose       PRN Meds:.oxyCODONE, simethicone, hyoscyamine, sodium chloride flush, sodium chloride, morphine **OR** morphine, promethazine, promethazine, metoclopramide, glucose, dextrose, glucagon (rDNA), dextrose  OBJECTIVE   CURRENT VITALS:  height is 5' 5\" (1.651 m) and weight is 306 lb 6.4 oz (139 kg) (abnormal). Her oral temperature is 97.9 °F (36.6 °C). Her blood pressure is 128/63 and her pulse is 86. Her respiration is 16 and oxygen saturation is 97%. Temperature Range (24h):Temp: 97.9 °F (36.6 °C) Temp  Av.2 °F (36.8 °C)  Min: 97.9 °F (36.6 °C)  Max: 98.4 °F (36.9 °C)  BP Range (12A): Systolic (62BVU), XKN:627 , Min:101 , IGH:370     Diastolic (09UFE), VERNA:58, Min:53, Max:86    Pulse Range (24h): Pulse  Av  Min: 86  Max: 91  Respiration Range (24h): Resp  Av  Min: 16  Max: 16  Current Pulse Ox (24h):  SpO2: 97 %  Pulse Ox Range (24h):  SpO2  Av.5 %  Min: 96 %  Max: 97 %  Oxygen Amount and Delivery: O2 Flow Rate (L/min): 6 L/min  Incentive Spirometry Tx:            GENERAL: alert, no distress, flushed  LUNGS: clear to ausculation, without wheezes, rales or rhonci  HEART: normal rate and regular rhythm  ABDOMEN: obese, non-distended, soft, incisional tenderness, bowel sounds present   INCISION: healing well, no significant drainage, no significant erythema  EXTERMITY: no cyanosis, clubbing or edema  In: 2454.9 [P.O.:250; I.V.:2204.9]  Out: -   Date 10/13/21 0000 - 10/13/21 2359   Shift 8734-5791 4537-2969 6551-2359 24 Hour Total   INTAKE   P.O. 100   100   I. V.(mL/kg/hr) 414.9   414.9   Shift Total(mL/kg) 514. 9(3.7)   514. 9(3.7)   OUTPUT   Shift Total(mL/kg)       Weight (kg) 139 139 139 139     LABS     Recent Labs     10/12/21  0614   HGB 12.8   HCT 39.3      K 4.1      CO2 22*   BUN 16   CREATININE 0.8   CALCIUM 8.6      RADIOLOGY     PROCEDURE: FL UGI       CLINICAL INFORMATION: Obesity, status post sleeve gastrectomy.       TECHNIQUE: A preliminary radiograph was obtained. Following the oral administration of Omnipaque 240 and thin barium, the anatomy of the distal esophagus, stomach and duodenum were evaluated in a limited upper GI examination. 10 images were obtained. Total fluoroscopy time 0.9 minutes.       COMPARISON: None       FINDINGS: Preliminary radiograph demonstrates a nonobstructive bowel gas pattern. There is surgical suture material in the left upper abdomen. Surgical clips are present in the right upper abdomen. Osseous structures are unremarkable.       There is no stenosis or large hiatal hernia in the distal esophagus. There are surgical changes in the stomach of prior sleeve gastrectomy. There is normal transit of contrast through the stomach into the duodenum. There is no extravasation of contrast    to suggest a leak. The duodenal sweep is normal.           Impression       No evidence of obstruction or leak following sleeve gastrectomy.       Final report electronically signed by Dr. Saman Henderson on 10/12/2021 8:32 AM       Electronically signed by THADDEUS Parr CNP on 10/13/2021 at 7:16 AM     Patient seen and examined independently by me earlier this AM. Above discussed and I agree with [place] CNP. See my additional comments below for updated orders and plan. Labs, cultures, and radiographs where available were reviewed. I discussed patient concerns with the patient's nurse and instructions were given. Please see our orders for the updated patient care plan. -Tolerating liquids better. Pain and nausea better controlled. Urinating spontaneously. Incisional/wound care. DVT prophylaxis. Home medication. Dietary education occurred this morning. Ambulating. Home today. Follow-up in clinic in 1 week.     Electronically signed by Tarun Campuzano MD on 10/13/21 at 11:16 AM EDT

## 2021-10-14 ENCOUNTER — TELEPHONE (OUTPATIENT)
Dept: FAMILY MEDICINE CLINIC | Age: 46
End: 2021-10-14

## 2021-10-14 NOTE — PROGRESS NOTES
CLINICAL PHARMACY NOTE: MEDS TO BEDS    Total # of Prescriptions Filled: 3   The following medications were delivered to the patient:  Ketorolac 10  Oxycodone 5  Hyoscyamine 0.125 subl    Additional Documentation:

## 2021-10-14 NOTE — DISCHARGE SUMMARY
sTering Pringle [facility]       Pt Name: Venkata Pedro  MRN: [id number]  [place]: 1975  Primary Care Physician: THADDEUS Russell CNP    Admit date:  10/11/2021  6:03 AM      Discharge date:  10/13/2021  1:30 PM    Admitting Diagnosis:   1.  Morbid obesity  2.  BMI 50  3. DM    Discharge Diagnosis:   1. S/P laparoscopic sleeve gastrectomy      Admitting Service: General Surgery, Michelle Sácnhez MD.    Consultants:  None    History and Physical:  JAIMIE Johnson (:  1975)      ASSESSMENT:  1.  Morbid obesity (BMI 50)   2.  Hypertension  3.  Hyperlipidemia  4.  Diabetes mellitus  5.  Depression  6.  Chronic lower back pain  7.  GERD     PLAN:  1. Discussion again about the pros and cons of weight loss surgery.  The risks benefits and alternatives to laparoscopic adjustable band, gastric sleeve and gastric Zain-en-Y bypass were discussed in detail.  The pros and cons of robotic assisted, laparoscopic and open techniques were discussed. 2.  Behavior modification discussed again in regards to dietary habits. 3.  Nutritional education occurred during visit.  Follow up  with dietitian for further evaluation and continue to follow recommendations as directed. 4.  Options for medical management of morbid obesity discussed. 5.  Improvement in fitness/exercise discussed with patient and the need for this with/without surgery. 6.  Medical necessity letter from PCP. 7.  Follow-up in one month at weight management program at [facility]. 8.  Signs and symptoms reviewed with patient that would be concerning and need her to return to office for re-evaluation. Patient states she will call if she has questions or concerns.   9. Multivitamin  10.  Psychology dilation completed.  Follow-up as needed. 11.  EGD completed.  Results reviewed.  H. pylori negative.   12.  Encouraged support groups  13.  Send LOMN     Patient states that she has not been able to lose enough adequate excess body weight with medical management only and would like to proceed with a robotic sleeve gastrectomy for further weight loss.     More than 15 minutes spent with patient today. Ailyn Arm than 50% of the time was involved counseling, educaton and coordinating care.        SUBJECTIVE/OBJECTIVE:             Chief Complaint   Patient presents with    Follow-up       month 5 of 3 desires sleeve       HPI  Zach Clayton is a 73-year-old female presents for follow-up in the weight management program secondary to her morbid obesity.  Current weight 307 pounds. Manuela Golden has lost about 12 pounds in the program since starting.  BMI 50.  Taking multivitamin.  Taken vitamin D3.  Completed upper endoscopy.  No Thompson's.  H. pylori negative.  Completed psychology evaluation.  Attended support groups.  Following with the dietitian. Jaci Best to work on portion control and food selection.  Staying well-hydrated. Juan Burows to improve with exercise/fitness.  Doing more walking with her spouse and also yoga.  Denies current chest or abdominal pain.  No hematochezia or melena.  No new urinary complaints.  She admits she has not been able to lose enough adequate excess body weight with medical management only and is wishing to proceed with a sleeve gastrectomy.  She states she is excited to proceed with surgery so as to continue to work towards her weight loss goals.     Review of Systems   Constitutional: Negative for activity change, appetite change, chills, diaphoresis, fatigue, fever and unexpected weight change. HENT: Negative for congestion, dental problem, drooling, ear discharge, ear pain, facial swelling, hearing loss, mouth sores, nosebleeds, postnasal drip, rhinorrhea, sinus pressure, sneezing, sore throat, tinnitus, trouble swallowing and voice change.    Eyes: Negative for photophobia, pain, discharge, redness, itching and visual disturbance. Respiratory: Negative for apnea, cough, choking, chest tightness, shortness of breath, wheezing and stridor.    Cardiovascular: Negative for chest pain, palpitations and leg swelling. Gastrointestinal: Negative for abdominal distention, abdominal pain, anal bleeding, blood in stool, constipation, diarrhea, nausea, rectal pain and vomiting. Endocrine: Negative.    Genitourinary: Negative for decreased urine volume, difficulty urinating, dyspareunia, dysuria, enuresis, flank pain, frequency, genital sores, hematuria, menstrual problem, pelvic pain, urgency, vaginal bleeding, vaginal discharge and vaginal pain. Musculoskeletal: Negative for arthralgias, back pain, gait problem, joint swelling, myalgias, neck pain and neck stiffness. Skin: Negative for color change, pallor, rash and wound. Allergic/Immunologic: Negative.    Neurological: Negative for dizziness, tremors, seizures, syncope, facial asymmetry, speech difficulty, weakness, light-headedness, numbness and headaches. Hematological: Negative for adenopathy. Does not bruise/bleed easily. Psychiatric/Behavioral: Negative for agitation, behavioral problems, confusion, decreased concentration, dysphoric mood, hallucinations, self-injury, sleep disturbance and suicidal ideas.  The patient is not nervous/anxious and is not hyperactive.          Past Medical History           Past Medical History:   Diagnosis Date    Back pain      Depression      Diabetes mellitus (HCC)      GERD (gastroesophageal reflux disease)      Hypercholesteremia      Hypertension      PONV (postoperative nausea and vomiting)      UTI (urinary tract infection)              Past Surgical History             Past Surgical History:   Procedure Laterality Date    CHOLECYSTECTOMY        ENDOMETRIAL ABLATION        HYSTERECTOMY ABDOMINAL N/A 12/12/2017     ROBOTIC TOTAL HYSTERECTOMY WITH BILATERAL SALPINGECTOMY performed by Gissel Thompson MD at [address] [place] [address]  Not on file   Tobacco Use    Smoking status: Never Smoker    Smokeless tobacco: Never Used   Vaping Use    Vaping Use: Never used   Substance and Sexual Activity    Alcohol use: No    Drug use: No    Sexual activity: Not on file   Other Topics Concern    Not on file   Social History Narrative    Not on file      Social Determinants of Health          Financial Resource Strain:     Difficulty of Paying Living Expenses:    Food Insecurity:     Worried About Running Out of Food in the Last Year:     [address] in the Last Year:    Transportation Needs:     Lack of Transportation (Medical):  Lack of Transportation (Non-Medical):    Physical Activity:     Days of Exercise per Week:     Minutes of Exercise per Session:    Stress:     Feeling of Stress :    Social Connections:     Frequency of Communication with Friends and Family:     Frequency of Social Gatherings with Friends and Family:     Attends Christianity Services:     Active Member of Clubs or Organizations:     Attends Club or Organization Meetings:     Marital Status:    Intimate Partner Violence:     Fear of Current or Ex-Partner:     Emotionally Abused:     Physically Abused:     Sexually Abused:           Vitals         Vitals:     05/14/21 1103   BP: 124/76   Site: Right Upper Arm   Position: Sitting   Cuff Size: Large Adult   Pulse: 66   Resp: 18   Temp: 97.3 °F (36.3 °C)   TempSrc: Infrared   Weight: (!) 307 lb 3.2 oz (139.3 kg)         Body mass index is 50.73 kg/m².           Wt Readings from Last 3 Encounters:   05/14/21 (!) 307 lb 3.2 oz (139.3 kg)   02/22/21 (!) 310 lb 6.4 oz (140.8 kg)   02/09/21 (!) 313 lb (142 kg)      Physical Exam  Vitals reviewed. Constitutional:       General: She is not in acute distress.     Appearance: She is well-developed. She is not diaphoretic.    HENT:      Head: Normocephalic and atraumatic.      Right Ear: External ear normal.      Left Ear: External ear normal.      Nose: Nose normal.   Eyes:      General: No scleral icterus.        Right eye: No discharge.         Left eye: No discharge.      Conjunctiva/sclera: Conjunctivae normal.   Cardiovascular:      Rate and Rhythm: Normal rate and regular rhythm.      Heart sounds: Normal heart sounds. Pulmonary:      Effort: Pulmonary effort is normal. No respiratory distress.      Breath sounds: Normal breath sounds. No wheezing or rales. Chest:      Chest wall: No tenderness. Abdominal:      General: Bowel sounds are normal. There is no distension.      Palpations: Abdomen is soft. There is no mass.      Tenderness: There is no abdominal tenderness. There is no guarding or rebound. Musculoskeletal:         General: No tenderness. Normal range of motion.      Cervical back: Normal range of motion and neck supple. Skin:     General: Skin is warm and dry.      Coloration: Skin is not pale.      Findings: No erythema or rash. Neurological:      Mental Status: She is alert and oriented to person, place, and time.      Cranial Nerves: No cranial nerve deficit. Psychiatric:         [facility]     Thought Content:  Thought content normal.         Judgment: Judgment normal.         CBC             Lab Results   Component Value Date     WBC 8.4 11/14/2020     RBC 4.54 11/14/2020     HGB 13.9 11/14/2020     HCT 41.1 11/14/2020     MCV 90.5 11/14/2020     MCH 30.6 11/14/2020     MCHC 33.8 11/14/2020     RDW 13.1 12/30/2017      11/14/2020     MPV 11.4 11/14/2020     RBCMORP NORMAL 08/11/2017     SEGSPCT 61.2 11/14/2020     LABLYMP 31.6 11/14/2020     MONOPCT 5.0 11/14/2020     LABEOS 1.3 11/14/2020     BASO 0.4 11/14/2020     NRBC 0 11/14/2020     SEGSABS 5.1 11/14/2020     LYMPHSABS 2.7 11/14/2020     MONOSABS 0.4 11/14/2020     EOSABS 0.1 11/14/2020     BASOSABS 0.0 11/14/2020      BMP/CMP             Lab Results   Component Value Date     GLUCOSE 139 11/14/2020     CREATININE 0.6 11/14/2020     BUN 16 11/14/2020    11/14/2020     K 4.2 11/14/2020      11/14/2020     CO2 24 11/14/2020     CALCIUM 9.2 11/14/2020     AST 27 11/14/2020     ALKPHOS 76 11/14/2020     PROT 7.1 11/14/2020     LABALBU 4.3 11/14/2020     BILITOT 0.6 11/14/2020     ALT 49 11/14/2020      PREALBUMIN             Lab Results   Component Value Date     PREALBUMIN 26.9 11/14/2020      VITAMIN B12             Lab Results   Component Value Date     ZUCPILEI91 415 11/14/2020      VITAMIN D             Lab Results   Component Value Date     VITD25 35 11/14/2020      PTH            Lab Results   Component Value Date     IPTH 53.6 11/14/2020      VITAMIN B1/ THIAMINE             Lab Results   Component Value Date     BGVH8ANOAUC 144 11/14/2020      LIPID SCREEN (FASTING)             Lab Results   Component Value Date     CHOL 240 11/14/2020     TRIG 274 11/14/2020     HDL 42 11/14/2020     LDLCALC 143 11/14/2020   ,   HGA1C (T2DM ONLY)             Lab Results   Component Value Date     LABA1C 6.4 11/14/2020     AVGG 132 11/14/2020      TSH             Lab Results   Component Value Date     TSH 2.570 11/14/2020      IRON             Lab Results   Component Value Date     IRON 95 11/14/2020      TIBC            Lab Results   Component Value Date     TIBC 304 11/14/2020      FERRITIN            Lab Results   Component Value Date     FERRITIN 111 11/14/2020      VITAMIN A            Lab Results   Component Value Date     RETINOL SEE BELOW 11/14/2020      NICOTINE  No results found for: NMET  UDS            Lab Results   Component Value Date     UDP SEE BELOW 11/14/2020      PSA  No results found for: LABPSA  GFR            Lab Results   Component Value Date     LABGLOM >90 11/14/2020      DEXA  No results found for this or any previous visit.     Imaging -      OPERATIVE REPORT     PATIENT NAME: Mirlande Licona:        1975  MED REC NO:   [id number]                           ROOM:  ACCOUNT NO: [de-identified]                           QLXWA DATE: 02/09/2021  PROVIDER: Carly Mendes M.D.     DATE OF PROCEDURE:  02/09/2021     PROCEDURES:  EGD plus biopsy.     SURGEON: Travis Fry M.D.     INDICATION FOR PROCEDURE:  The patient is here for pre gastric sleeve  clearance.  See the consult from the office and preop note for rest of  clinicals.     ASA CLASSIFICATION:  II.     MEDICATION:  As per Anesthesia.     BIOPSY:  Yes.     PHOTOGRAPH:  Yes.     DESCRIPTION OF THE PROCEDURE:  Informed consent was obtained after  explaining risks and benefits of the procedure and conscious sedation. Possible complications including bleeding, perforation, reaction to  medicine, but not limiting to death, were discussed.     Afterwards, GIF-180 gastroscope was advanced through oropharynx,  esophagus, stomach into the duodenum.  Mucosa looks very healthy.  The  patient has family history of celiac but there is no endoscopic  suspicion for it.  No biopsies done.  Scope was withdrawn.  Gastritis  biopsies were taken to check for HP.  Retroflex exam showed gastritis in  the body.  Fundus and cardia were relatively normal.  Small hiatal  hernia, on top of that,  minimal esophagitis distal, irregular GE  junction.  Using NBI light, close examination was done and multiple  biopsies were taken to check for Thompson's.  The patient tolerated the  procedure well.     IMPRESSION:  1.  Mild GERD with possible Thompson's.   2.  Gastritis.  We will check for HP.     RECOMMENDATIONS:  The patient is cleared for gastric sleeve surgery.  We  will have an office visit to discuss biopsy and make a long-term plan as  to how to manage GERD.  We will set it up in couple of months.     BLOOD LOSS: Becky Roberts M.D.     D: 02/09/2021 13:40:00       T: 02/09/2021 16:52:30         PATHOLOGY REPORT                       ATTN: Ezra Milton                       REQ: Ezra Milton     Copies To:   BABITA CARLSON     Clinical Information: PRE BARIATRIC FINAL DIAGNOSIS:   A: Stomach, biopsies:    Reactive/chemical gastropathy.    Negative for H. pylori organisms. B: Esophagus, biopsies:    Chronic reflux esophagitis and chronic carditis.    Negative for intestinal metaplasia and dysplasia. Specimen:   A) STOMACH BIOPSY, GASTRIC, GASTRITIS   B) BIOPSY OF ESOPHAGUS, CHECK FOR SAWANT'S     Gross Examination:   A - The container is labeled Ivy Joinerd, stomach, gastritis. Received in formalin are two bits of tan tissue varying in size from 2   mm up to 4 mm.  1 ns. B - The container is labeled Ivy Joinerd, esophagus, Sawant's. Received in formalin are two 2 mm bits of tan tissue.  1 ns. MTK/DKR:[id number]     Microscopic Examination:   A: The specimen consists of bits of gastric antral mucosa.  There is   mucosal distortion with smooth muscle in the lamina propria accompanied   by corkscrewing of the gastric pits.  There is evidence of mucosal   repair.  Significant chronic and active inflammation is not identified.    In addition, there is no evidence of intestinal metaplasia, dysplasia,   or neoplasia.  H. pylori organisms are also not seen. B: The specimen consists of fragments of the squamocolumnar junction. The squamous mucosa demonstrates findings consistent with chronic   reflux esophagitis.  The glandular mucosa consists of gastric foveolar   type epithelium. Deidre Carmelina is a chronic inflammatory infiltrate within the   glandular mucosa.  There is no evidence of intestinal metaplasia or   dysplasia.                                                 Cyn Rodriguez M.D., F.C.A.P.            Patient Active Problem List   Diagnosis    Type 2 diabetes mellitus without complication, without long-term current use of insulin ([facility] [address]    Essential hypertension    Morbid obesity with BMI of 50.0-59.9, adult ([facility] [address]    Recurrent major depressive disorder, in full remission [facility])      An electronic signature was used to authenticate this note.     --Eloina Gutierrez MD      ADDENDUM:  1. Schedule Moy Malin for robotic sleeve gastrectomy. 2. She will undergo pre-operative clearance per anesthesia guidelines with risk factors listed under the past medical history diagnosis & problem list.  3. The risks, benefits and alternatives were discussed with Moy Malin including non-operative management. The pros and cons of robotic, laparoscopic and open techniques were discussed. All questions answered. She understands and wishes to proceed with surgical intervention. 4. Restrictions discussed with Moy Malin and she expresses understanding. 5. She is advised to call back directly if there are further questions/concerns, or if her symptoms worsen prior to surgery.     Electronically signed by Eloina Gutierrez MD on 10/9/21 at 6:05 PM EDT     Procedures/Diagnostic Tests:    Patient: Zi Westbrook  YOB: 1975  MRN: [id number]  [address] [facility]                         Operative Report        Pt Name: Zi Westbrook  MRN: [id number]  YOB: 1975  Surgeon: Karthik Daugherty MD [facility]  Primary Care Physician: THADDEUS Antonio CNP  Procedure Date: 10/11/2021                PREOPERATIVE DIAGNOSES:  1.  Morbid obesity  2.  BMI 50  3. DM     POSTOPERATIVE DIAGNOSES: Same      PROCEDURE:  Robotic sleeve gastrectomy.     SURGEON: Karthik Daugherty M.D. FACS     ASSISTANT:  RUDI Elizabeth     ANESTHESIA:  General/local.     ESTIMATED BLOOD LOSS:  10 ml     DRAINS:  None     COMPLICATIONS:  None     DISPOSITION:  Stable to the recovery room     SPECIMEN:  Stomach.     INDICATIONS FOR PROCEDURE:  The patient is a 42 y.o.-old female who had  been seen in the weight management program secondary to her morbid obesity. She was not able to lose enough adequate excess body weight with medical  management only, and wished to proceed with a robotic sleeve gastrectomy.    Risks of surgery were then further discussed.  Some of the risks included  but were not limited to bleeding, infection, the need for reoperation,  severe chronic postoperative pain or numbness, major vascular or nerve  injury, cardiopulmonary complications, anesthetic complications,  seroma/hematoma formation, wound breakdown, trocar site herniation, staple  line breakdown/leak, staple line bleed, sleeve stricture, chronic nausea, chronic  pain, and death.  After all of the questions were answered in their  entirety and the patient was completely aware of the current situation, she  elected to proceed with the procedure.     DESCRIPTION OF THE PROCEDURE:  After informed consent was signed and placed  on the chart, the patient was taken back to the operating room and placed  supine on the operating room table.  General anesthesia was induced. She  tolerated this well throughout the case.  All pressure points were padded. She was on preoperative antibiotics.  Bilateral lower extremity sequential  compression devices were placed prior to incision. Her abdomen and pelvis  were prepped and draped in the usual sterile standard fashion.  A time-out  occurred prior to the operation, which not only identified the patient but  also the planned procedure to be performed.  At the end of the time-out,  there were no questions or concerns.     I began the operation first by making a small transverse incision just  above and to the right of the umbilicus.  Using low-flow insufflation and  the OptiView, the abdomen was entered into without difficulty.  The  intra-abdominal cavity was insufflated to a pressure of approximately 15  mmHg with carbon dioxide gas.  The patient tolerated the insufflation well. Three separate 8-mm trocars were placed in their standard location under  direct vision.  A 5-mm trocar was placed in the far right lateral abdominal  region under direct vision.  Liver retractor was brought in and placed  without difficult under direct vision.  The 11 mm was then replaced by  12-mm robotic trocar for the stapler and specimen extraction.  The patient was then  placed in reverse Trendelenburg.  Robot brought in and docked.  Instruments  were placed under direct vision.  Once everything was aligned and in order,  I then unscrubbed and went back to the console.  I began the operation by  first evaluating the hiatus.  There appeared to be no significant hiatal  hernia.  The lesser sac was entered into using the vessel sealer going  through the mesentery there on the greater curvature of the stomach. Mesentery here was then unzipped distally to about 5 to 6 cm proximal from the  pylorus.  It was then unzipped proximal up and around the fundus to the  left ania.  Short gastric vessels were taken down.  Spleen preserved. Hemostasis adequate.  Here, it was confirmed there was no significant  hiatal hernia.  After it was assured that the fundus and the upper stomach  was completely mobilized, the bougie 36-Maltese was then placed under direct  vision and positioned with the help of anesthesia.  This was then placed to suction.  First firing was  a 61 robotic black reinforced SEAMGUARD cartridge.  Care was taken to hug the bougie  but also not to stricture down the angle of the stomach.     There were several firings of the 60-mm robotic green reinforced SEAMGUARD  cartridges.  All of these hugged the bougie.  Last firing was a 60 mm robotic green reinforced SEAMGUARD  cartridge.  Here, care was taken to ensure taking all of  the excess fundus, but also not to stricture down or encroach on the GE  junction.  Staple line was then tested under irrigation with air  insufflation through the bougie and clamped distally.  No air bubbles or  any kind of concern for leak was identified.  Irrigation was then removed. Bougie taken off of suction and removed under direct visualization.   0 Ethibond suture was then placed through the  gastric remnant and brought up to the 12-mm trocar site.  Instruments were  removed.  Robot undocked.  I then scrubbed back into the case.  Gastric  remnant was removed and sent to Pathology for permanent.  Using a Storz  suture passer and 0 Vicryl suture, this was placed through the fascia there  around the 12-mm trocar site.  This was tied and at the completion of this,  there were no fascial defects.  Liver retractor removed.  Other trocars  were then removed under direct vision.  Hemostasis was adequate.  The  patient tolerated desufflation well.  Vicryl suture 4-0 was used in a  subcuticular fashion in all the open incisions for skin closure.  Closed  incisions were then cleaned, dried, and Steri-Strips applied.  Marcaine  0.5% with epinephrine was used for local anesthetic.  The patient tolerated  the injection of local anesthetic without difficulty.  Sponge, needle and  instrumentation counts were correct at the end of the procedure.  The  patient tolerated the procedure well with no apparent complications and  only about 10 mL of blood loss. She was later brought out of  general anesthesia and then transferred to postanesthesia care unit in  stable condition.     Grabiel Curtis M.D. FACS  Electronically signed 10/11/2021 at 9:18 AM     PROCEDURE: FL UGI       CLINICAL INFORMATION: Obesity, status post sleeve gastrectomy.       TECHNIQUE: A preliminary radiograph was obtained. Following the oral administration of Omnipaque 240 and thin barium, the anatomy of the distal esophagus, stomach and duodenum were evaluated in a limited upper GI examination. 10 images were obtained. Total fluoroscopy time 0.9 minutes.       COMPARISON: None       FINDINGS: Preliminary radiograph demonstrates a nonobstructive bowel gas pattern. There is surgical suture material in the left upper abdomen. Surgical clips are present in the right upper abdomen. Osseous structures are unremarkable.       There is no stenosis or large hiatal hernia in the distal esophagus. There are surgical changes in the stomach of prior sleeve gastrectomy. There is normal transit of contrast through the stomach into the duodenum. There is no extravasation of contrast    to suggest a leak. The duodenal sweep is normal.           Impression       No evidence of obstruction or leak following sleeve gastrectomy.       Final report electronically signed by Dr. Cassie Rodriguez on 10/12/2021 8:32 AM     Hospital Course: Marko Gonzales is a 28-year-old female who presented to the weight management program for her (morbid) obesity, BMI 50. She decided to electively undergo robotic assisted sleeve gastrectomy with Dr. Lennox Stack on 10/11/21. She was admitted to [facility] for postoperative care and was initially managed with bowel rest, analgesics for pain control, IV fluid hydration, GI and DVT prophylaxis. On 10/12/21 she passed her upper GI study and patient was advanced to sugar free clear liquids. She readily improved in ability to tolerate increasing levels of activity and able to take clear liquids with minimal nausea. Patient was able to void on her own accord. Marko Gonzales was discharged home on liquid diet until follow up appointment. Discharge Condition: stable    Disposition: home    Labs:  Lab Results   Component Value Date    WBC 12.0 (H) 10/09/2021    HGB 12.8 10/12/2021    HCT 39.3 10/12/2021    MCV 90.3 10/09/2021     10/09/2021     Lab Results   Component Value Date     10/12/2021    K 4.1 10/12/2021     10/12/2021    CO2 22 10/12/2021    BUN 16 10/12/2021    CREATININE 0.8 10/12/2021    GLUCOSE 167 10/12/2021    CALCIUM 8.6 10/12/2021        Wt Readings from Last 3 Encounters:   10/11/21 (!) 306 lb 6.4 oz (139 kg)   08/16/21 (!) 309 lb 9.6 oz (140.4 kg)   07/14/21 (!) 311 lb (141.1 kg)       Discharge Instructions:  Pt Name: [address] [place] [address] Record Number: [id number]  Today's Date: 10/13/2021    GENERAL ANESTHESIA OR SEDATION  1.  Do not drive or operate hazardous machinery for 24 hours. 2. Do not make important business or personal decisions for 24 hours. 3. Do not drink alcoholic beverages or use tobacco for 24 hours. ACTIVITY INSTRUCTIONS:  [] Rest today. Resume light to normal activity tomorrow.   [] You may resume normal activity tomorrow. Do not engage in strenuous activity that may place stress on your incision. [x] Do not drive for 3-5 days or while taking pain medication. Avoid heavy lifting, tugging, pullings greater than 10 lbs until seen in the office. DIET INSTRUCTIONS:  [x]Other: Sugar-free post bariatric surgery diet as instructed per dietician. Continue protein shakes as prior to surgery. Two shakes a day with milk or three shakes a day with water. MEDICATIONS  [x]Prescription sent with you to be used as directed. []Percocet   []Vicodin   [x]oxycodone    []Tylenol #3   []Oxycontin  Do not drink alcohol or drive while taking these medications. You may experience dizziness or drowsiness with these medications. You may also experience constipation which can be relieved with stool softners or laxatives. [x]You may resume your daily prescription medication schedule unless otherwise specified. [x]Do not take 325mg Aspirin or other blood thinners such as Coumadin or Plavix for 5 days. Do not resume your diabetic medications unless your blood glucose level is consistently greater than 200 mg/dL. Please make an appointment to see the physician who manages your diabetes. Take the Zofran exactly as prescribed to control nausea and vomiting. Take the Reglan exactly as prescribed to control nausea and vomiting. Take the Lovenox exactly as prescribed. Use Colace and MiraLAX as prescribed to prevent constipation. Do not allow yourself to become constipated. Drink at least 64 ounces of liquids per day. If constipated despite adequate fluid intake and no results with Colace and MiraLAX, may use magnesium citrate 1 bottle as needed. hours, you can reach the answering service via the office phone number. IF YOU NEED IMMEDIATE ATTENTION, GO TO THE EMERGENCY ROOM AND YOUR DOCTOR WILL BE CONTACTED. Prepared by Magnolia Madirgal CNP for  Karla Briscoe MD  [address]  [place], [address]  Electronically signed 10/13/2021 at 10:33 AM    Discharge Medications:        Medication List      START taking these medications    hyoscyamine 125 MCG Tbdp dispersible tablet  Commonly known as: OSCIMIN  Take 1 tablet by mouth every 4 hours as needed (epigastric cramping)     ketorolac 10 MG tablet  Commonly known as: TORADOL  Take 1 tablet by mouth every 6 hours as needed for Pain     oxyCODONE 5 MG immediate release tablet  Commonly known as: ROXICODONE  Take 1 tablet by mouth every 6 hours as needed for Pain for up to 7 days.         CONTINUE taking these medications    atorvastatin 20 MG tablet  Commonly known as: LIPITOR  TAKE 1 TABLET BY MOUTH ONE TIME A DAY     Cranberry/Probiotic Tabs     Docusate Sodium 100 MG Tabs  Take 100 mg by mouth 2 times daily Take as needed to prevent constipation     FLUoxetine 20 MG capsule  Commonly known as: PROZAC  Take 3 capsules by mouth daily     lisinopril-hydroCHLOROthiazide 20-12.5 MG per tablet  Commonly known as: PRINZIDE;ZESTORETIC  Take 1 tablet by mouth daily     metoclopramide 10 MG tablet  Commonly known as: REGLAN     MULTIVITAMIN ADULT PO     omeprazole 40 MG delayed release capsule  Commonly known as: PRILOSEC  Take 1 capsule by mouth every morning (before breakfast) Open capsule post-op     vitamin D 25 MCG (1000 UT) Caps        STOP taking these medications    Farxiga 5 MG tablet  Generic drug: dapagliflozin           Where to Get Your Medications      These medications were sent to [address], [address]  [address]  [facility], [address] [address]    Phone: [phone]   · hyoscyamine 125 MCG Tbdp dispersible tablet  · ketorolac 10 MG tablet  · oxyCODONE 5 MG immediate release tablet         Follow-up:  in the next few weeks with THADDEUS Mtz CNP  Follow up: in 1 week in weight management with Dr. Soy Cerda MD/Mad[place], [address], APRN - CNP, CNP   Electronincally signed 10/14/2021 at 6:26 AM    A total of 35 minutes was spent in preparing the patient for discharge with greater than 50% of the time involved with education, counseling and coordinating care

## 2021-10-14 NOTE — TELEPHONE ENCOUNTER
[facility] Transitions Initial Follow Up Call    Outreach made within 2 business days of discharge: Yes    Patient: Bisi Lancaster Patient : 1975   MRN: [id number]  Reason for Admission: There are no discharge diagnoses documented for the most recent discharge.   Discharge Date: 10/13/21       Spoke with: PAULETTE    Discharge department/facility: [facility]      Scheduled appointment with PCP within 7-14 days    Follow Up  Future Appointments   Date Time Provider Destiny Moon   10/19/2021  3:30 PM NADEEM De Jesus N SRPX WT [facility] [address]   10/20/2021  9:30 AM THADDEUS Trivedi   10/25/2021  2:00 PM Maikol Ramachandran RD, MISA N SRPX WT [facility] [address] [facility]   10/25/2021  2:30 PM NADEEM De Jesus SRPX WT [facility] [address]   2022  2:30 PM [address], LPN

## 2021-10-15 NOTE — TELEPHONE ENCOUNTER
[facility] Transitions Initial Follow Up Call    Outreach made within 2 business days of discharge: Yes    Patient: Gladys Laguerre Patient : 1975   MRN: [id number]  Reason for Admission: There are no discharge diagnoses documented for the most recent discharge. Discharge Date: 10/13/21       Spoke with: patient    Discharge department/facility: [facility]    TCM Interactive Patient Contact:  Was patient able to fill all prescriptions: Yes  Was patient instructed to bring all medications to the follow-up visit: Yes  Is patient taking all medications as directed in the discharge summary?  Yes  Does patient understand their discharge instructions: Yes  Does patient have questions or concerns that need addressed prior to 7-14 day follow up office visit: no    Scheduled appointment with PCP within 7-14 days    Follow Up  Future Appointments   Date Time Provider Destiny Moon   10/19/2021  3:30 PM NADEEM Wagner N SRPX WT [facility] [place]   10/20/2021  9:30 AM THADDEUS Royal   10/25/2021  2:00 PM Linsey Banuelos RD, LD N SRPX [facility] [place]   10/25/2021  2:30 PM NADEEM Wagner SRPX [facility] [place]   2022  2:30 PM [address], LPN

## 2021-10-15 NOTE — TELEPHONE ENCOUNTER
[facility] Transitions Initial Follow Up Call    Outreach made within 2 business days of discharge: Yes    Patient: Yojana Brown Patient : 1975   MRN: [id number]  Reason for Admission: There are no discharge diagnoses documented for the most recent discharge. Discharge Date: 10/13/21       Spoke with: PAULETTE    Discharge department/facility: [facility]    TCM Interactive Patient Contact:  Was patient able to fill all prescriptions: N/A  Was patient instructed to bring all medications to the follow-up visit: N/A  Is patient taking all medications as directed in the discharge summary?  N/A  Does patient understand their discharge instructions: N/A  Does patient have questions or concerns that need addressed prior to 7-14 day follow up office visit: N/A    Scheduled appointment with PCP within 7-14 days    Follow Up  Future Appointments   Date Time Provider Destiny Moon   10/19/2021  3:30 PM NADEEM Ellis SRPX [facility] [place]   10/20/2021  9:30 AM THADDEUS Singh   10/25/2021  2:00 PM Marley Barahona RD, LD N SRPX [facility] [place]   10/25/2021  2:30 PM NADEEM Ellis SRPX [facility] [place]   2022  2:30 PM [address], LPN

## 2021-10-19 ENCOUNTER — OFFICE VISIT (OUTPATIENT)
Dept: BARIATRICS/WEIGHT MGMT | Age: 46
End: 2021-10-19

## 2021-10-19 VITALS
TEMPERATURE: 96.6 F | HEIGHT: 65 IN | DIASTOLIC BLOOD PRESSURE: 88 MMHG | SYSTOLIC BLOOD PRESSURE: 126 MMHG | HEART RATE: 80 BPM | WEIGHT: 293 LBS | BODY MASS INDEX: 48.82 KG/M2 | RESPIRATION RATE: 18 BRPM

## 2021-10-19 DIAGNOSIS — Z98.84 S/P LAPAROSCOPIC SLEEVE GASTRECTOMY: Primary | ICD-10-CM

## 2021-10-19 DIAGNOSIS — I10 ESSENTIAL HYPERTENSION: ICD-10-CM

## 2021-10-19 DIAGNOSIS — E11.9 DIABETES MELLITUS TYPE II, NON INSULIN DEPENDENT (HCC): ICD-10-CM

## 2021-10-19 DIAGNOSIS — F32.A DEPRESSION, UNSPECIFIED DEPRESSION TYPE: ICD-10-CM

## 2021-10-19 DIAGNOSIS — K21.9 GASTROESOPHAGEAL REFLUX DISEASE, UNSPECIFIED WHETHER ESOPHAGITIS PRESENT: ICD-10-CM

## 2021-10-19 DIAGNOSIS — E78.5 HYPERLIPIDEMIA, UNSPECIFIED HYPERLIPIDEMIA TYPE: ICD-10-CM

## 2021-10-19 PROCEDURE — 99024 POSTOP FOLLOW-UP VISIT: CPT | Performed by: PHYSICIAN ASSISTANT

## 2021-10-19 NOTE — PATIENT INSTRUCTIONS
 Stay well hydrated. Drink a minimum of 64 oz of non-carbonated, non-caffeinated fluids daily.  Nutritional education occurred during visit. Tolerating diet. Continue following  with dietitian and follow their recommendations as  directed. Continue  60-80  grams of protein each day.   Signs and symptoms reviewed with patient that would be concerning and need her to return to office for re-evaluation. Patient will call if any questions or  concerns arrise.  No lifting, pushing, pulling over 20#   No abdominal exercises   Wear abdominal binder prn   Complete Lovenox as rx   Importance of physical activity discussed with patient. Advised to increase activity as tolerated.  Continue taking Multivitamin ( CelebrateONE 45 OR [place] with Iron 2 daily), Calcium and B12 as directed   Continue PPI for at least 3 months post-op.  Encouraged to attend support groups   May start soft foods over weekend (AFTER 2 protein shakes) yogurt, cottage cheese, mashed potatoes, applesauce   Monitor blood sugars- adjust medication with PCP as needed.     Monitor Blood pressure-adjust medication with PCP as needed   Follow-up in 1 week with provider and dietitian   Genepro samples given today

## 2021-10-22 NOTE — PROGRESS NOTES
Patient is a 42 y.o. female seen for follow up MNT visit for two week post op. Vitals from current and previous visits:  Vitals 99/38/5542   SYSTOLIC    DIASTOLIC    Site    Position    Cuff Size    Pulse    Temp    Resp    SpO2    Weight 293 lb   Height 5' 5.25\"   Body mass index 48.38 kg/m2        Recent Post Op Lab Work:    Lab Results   Component Value Date    VITD25 35 11/14/2020       Date of Surgery: 10/11/21  Type of Surgery: gastric sleeve     Obesity Classification: Class III    Initial Weight: 309 lbs at pre-op teaching class   Excess  Body Weight Pre-Op: 157  lbs    Weight Loss: 16 lbs. Patient's Target Weight =  152 lbs for a BMI of ~25  Percentage of Excess Body Weight Lost to date is :  10.1 %    How pleased are you with your current weight loss: Pt is pleased with weight loss    Are you experiencing any physical problems post surgery: No: Denies heartburn or reflux. Does use a  Stool Softener and has used Miralax      Protein intake: 60-80 grams/day   Patient taking protein supplement: Yes. Brand of Supplement: Nectar and Premier Protein Oats (20 grams protein)- two protein shakes a day    Fluid intake: > 64 oz water and fluids total - 28 oz Powerade Zero and unsweet tea, and water plain 20 oz    Multivitamin/mineral intake: [place] Chewable with Iron twice a day = 36 grams iron/day- did not tolerate bariatric chewable MVI    Calcium intake: Yes. Calcium Citrate 500 mg BID     Other: B12 daily      Assessment  Pt tolerating clear and full liquids without difficulty. Supplementing with protein shakes. Taking vitamins    Plan  Plan/Recommendations: Educated pt on two week post op nutrition guidelines. Sample menus, recipes, and restaurant card given to patient. See Additional Instructions below. -  Patient Instructions   1. Begin to set aside three meal times per day about 1/4 cup portion size.     Important to have set meal times so can still focus on adequate fluid intake between meals and get your protein in.  2.  Now is when you want to separate your liquids from solids. No liquids 30 minutes before your meals and no liquids 30 minutes after your meals. Water goal at least 64 oz per day  3. Continue to drink protein shakes between meals as can not get enough protein from food alone at this time. Goal is 60-80 grams protein per day. 4.  Continue taking bariatric vitamins as currently taking. Get  your six week post op lab work completed 5-7 days prior to next office visit.       Recommended Follow-Up: six week post op with PA and RD    Fidel Lund RD, LD, RD, LD  Dietitian- Weight Management [address] [place] [address]

## 2021-10-25 ENCOUNTER — OFFICE VISIT (OUTPATIENT)
Dept: BARIATRICS/WEIGHT MGMT | Age: 46
End: 2021-10-25

## 2021-10-25 VITALS
SYSTOLIC BLOOD PRESSURE: 122 MMHG | TEMPERATURE: 94.8 F | HEART RATE: 84 BPM | WEIGHT: 293 LBS | HEIGHT: 65 IN | BODY MASS INDEX: 48.82 KG/M2 | DIASTOLIC BLOOD PRESSURE: 88 MMHG

## 2021-10-25 VITALS — BODY MASS INDEX: 48.82 KG/M2 | WEIGHT: 293 LBS | HEIGHT: 65 IN

## 2021-10-25 DIAGNOSIS — E11.9 DIABETES MELLITUS TYPE II, NON INSULIN DEPENDENT (HCC): ICD-10-CM

## 2021-10-25 DIAGNOSIS — K21.9 GASTROESOPHAGEAL REFLUX DISEASE, UNSPECIFIED WHETHER ESOPHAGITIS PRESENT: ICD-10-CM

## 2021-10-25 DIAGNOSIS — Z98.84 STATUS POST BARIATRIC SURGERY: Primary | ICD-10-CM

## 2021-10-25 DIAGNOSIS — Z13.21 SCREENING FOR MALNUTRITION: ICD-10-CM

## 2021-10-25 DIAGNOSIS — I10 ESSENTIAL HYPERTENSION: ICD-10-CM

## 2021-10-25 DIAGNOSIS — E78.5 HYPERLIPIDEMIA, UNSPECIFIED HYPERLIPIDEMIA TYPE: ICD-10-CM

## 2021-10-25 DIAGNOSIS — K91.2 POSTSURGICAL MALABSORPTION: ICD-10-CM

## 2021-10-25 DIAGNOSIS — Z98.84 S/P LAPAROSCOPIC SLEEVE GASTRECTOMY: Primary | ICD-10-CM

## 2021-10-25 DIAGNOSIS — F32.A DEPRESSION, UNSPECIFIED DEPRESSION TYPE: ICD-10-CM

## 2021-10-25 PROCEDURE — 99024 POSTOP FOLLOW-UP VISIT: CPT | Performed by: PHYSICIAN ASSISTANT

## 2021-10-25 RX ORDER — DOCUSATE SODIUM 100 MG/1
100 CAPSULE, LIQUID FILLED ORAL 2 TIMES DAILY
COMMUNITY
End: 2022-01-26

## 2021-10-25 NOTE — PATIENT INSTRUCTIONS
Stay well hydrated. Drink a minimum of 64 oz of non-carbonated, non-caffeinated fluids daily. Nutritional education occurred during visit. Tolerating diet. Continue following with dietitian and follow their recommendations as directed. Continue  60-80  grams of protein each day. Signs and symptoms reviewed with patient that would be concerning and need her to return to office for re-evaluation. Patient will call if any questions or concerns arrise. No lifting, pushing, pulling over 20#  No abdominal exercises  Wear abdominal binder prn  Complete Lovenox as rx  Importance of physical activity discussed with patient. Increase physical activity as tolerated  Continue taking Multivitamin, Calcium and B12 as directed  Continue PPI for at least 3 months post-op. Encouraged to attend support groups  Progress diet as tolerated per dietitian recommendations. 6 week labs ordered- to be drawn one week prior to next apt.  Monitor blood sugars- adjust medication with PCP as needed.     Monitor Blood pressure-adjust medication with PCP as needed

## 2021-10-25 NOTE — PROGRESS NOTES
[facility]  [address]  [place]      Visit Date:  10/25/2021  Weight Management Postop Follow-up    HPI:      Chelsea Duffy is a 42 y.o. female who is here today for 2 weeks follow up since  robotic-assisted sleeve gastrectomy performed by Dr. Mona Ovalles  on 10/11/21. Reports that she is doing really well. Feeling great. Weight today 293#. Down 2# since last visit. Down 13# since surgery. BMI 48 . Drinking 64 oz of fluid and 64 grams of protein daily. Drinking 2 protein shakes daily- tolerating better this week. Using Nectar protein powder. No carbonation. No sweets. Has tried and tolerated applesauce, cottage cheese and mashed potatoes. Tolerating post-op diet. No problems with bowel movements. Continues to take stool softener twice daily. No nausea. No emesis. Off all nausea medications. No GERD/ Reflux-continues to take PPI. Denies CP/SOB. No Dizziness. No abdominal pain. No incisional discomfort. No sx of dehydration. No fever or chills. Taking Lovenox, as rx. Off all pain meds. Taking and tolerating all vitamins- [place] with iron 2 daily/ Calcium 2 daily and B12. Stopped taking BP medication as BP was running low. Monitoring at home. Has to reschedule apt with PCP. Blood sugars running <120. Off all Dm medications since surgery. Continues to take Lipitor and Prozac. Physical Activity:  Walking 5-10 minutes every 2 hours. Current BMI: Body mass index is 48.38 kg/m².   Current Weight:   Wt Readings from Last 3 Encounters:   10/25/21 293 lb (132.9 kg)   10/25/21 293 lb (132.9 kg)   10/19/21 295 lb 12.8 oz (134.2 kg)     Pre-op Body Weight:306      Past Medical History:  Past Medical History:   Diagnosis Date    Back pain     Body mass index 50.0-59.9, adult ([address] 06/23/2021    Depression     Diabetes mellitus (HCC)     GERD (gastroesophageal reflux disease)     Hypercholesteremia     Hypertension     UTI (urinary tract infection)        Past Surgical History:  Past Surgical History:   Procedure Laterality Date    CHOLECYSTECTOMY      COLONOSCOPY      ENDOMETRIAL ABLATION      HYSTERECTOMY ABDOMINAL N/A 12/12/2017    ROBOTIC TOTAL HYSTERECTOMY WITH BILATERAL SALPINGECTOMY performed by Leila Quintana MD at [facility]      vocal cord mass removed    OVARIAN CYST REMOVAL      SLEEVE GASTRECTOMY N/A 10/11/2021    GASTRECTOMY SLEEVE LAPAROSCOPIC ROBOTIC performed by Tarun aCmpuzano MD at [facility] [address] N/A 2/9/2021    EGD BIOPSY performed by Jerel Lucio MD at [facility]       Past Social History:  Social History     Socioeconomic History    Marital status:      Spouse name: Not on file    Number of children: Not on file    Years of education: Not on file    Highest education level: Not on file   Occupational History    Not on file   Tobacco Use    Smoking status: Never Smoker    Smokeless tobacco: Never Used   Vaping Use    Vaping Use: Never used   Substance and Sexual Activity    Alcohol use: No    Drug use: No    Sexual activity: Yes     Partners: Male   Other Topics Concern    Not on file   Social History Narrative    Not on file     Social Determinants of Health     Financial Resource Strain: Low Risk     Difficulty of Paying Living Expenses: Not hard at all   Food Insecurity: No Food Insecurity    Worried About [facility] in the Last Year: Never true    [address] in the Last Year: Never true   Transportation Needs:     Lack of Transportation (Medical):      Lack of Transportation (Non-Medical):    Physical Activity:     Days of Exercise per Week:     Minutes of Exercise per Session:    Stress:     Feeling of Stress :    Social Connections:     Frequency of Communication with Friends and Family:     Frequency of Social Gatherings with Friends and Family:     Attends Buddhist Services:     Active Member of Clubs or Organizations:     Attends Club or Organization Meetings:     Marital Status:    Intimate Partner Violence:     Fear of Current or Ex-Partner:     Emotionally Abused:     Physically Abused:     Sexually Abused:         Medications:   Current Outpatient Medications   Medication Sig Dispense Refill    docusate sodium (COLACE) 100 MG capsule Take 100 mg by mouth 2 times daily      Multiple Vitamins-Minerals (CELEBRATE MULTI-COMPLETE 39) CHEW Take by mouth      Calcium Citrate-Vitamin D (CALCIUM + VIT D, BARIATRIC ADVANTAGE, CHEWABLE TABLET) Take 1 tablet by mouth daily      Cyanocobalamin (B-12 SL) Place 1 tablet under the tongue daily      omeprazole (PRILOSEC) 40 MG delayed release capsule Take 1 capsule by mouth every morning (before breakfast) Open capsule post-op 30 capsule 2    atorvastatin (LIPITOR) 20 MG tablet TAKE 1 TABLET BY MOUTH ONE TIME A DAY 90 tablet 1    FLUoxetine (PROZAC) 20 MG capsule Take 3 capsules by mouth daily 360 capsule 3    lisinopril-hydroCHLOROthiazide (PRINZIDE;ZESTORETIC) 20-12.5 MG per tablet Take 1 tablet by mouth daily (Patient not taking: Reported on 10/25/2021) 90 tablet 3     No current facility-administered medications for this visit. Allergies:   No Known Allergies    Subjective:    Review of Systems:  Constitutional: Denies any fever, chills, fatigue. Wound: Denies any rash, skin color changes or wound problems. Resp: Denies any cough, shortness of breath. CV: Denies any chest pain, orthopnea or syncope. MS: Denies myalgias, arthralgias. GI: Denies any nausea, vomiting, diarrhea, constipation, melena, hematochezia. No incisional discomfort. : Denies any hematuria, hesitancy or dysuria. NEURO: Denies seizures, headache.     Objective:    /88 (Site: Left Upper Arm, Position: Sitting, Cuff Size: Large Adult)   Pulse 84   Temp 94.8 °F (34.9 °C) (Infrared)   Ht 5' 5.25\" (1.657 m)   Wt 293 lb (132.9 kg)   BMI 48.38 Date    FOLATE > 20.0 11/14/2020        LIPID SCREEN (FASTING)   Lab Results   Component Value Date    CHOL 206 08/25/2021    TRIG 284 08/25/2021    HDL 41 08/25/2021    LDLCALC 108 08/25/2021   ,     HGA1C (T2DM ONLY)   Lab Results   Component Value Date    LABA1C 6.8 08/25/2021    AVGG 162 06/30/2021        TSH   Lab Results   Component Value Date    TSH 2.570 11/14/2020        IRON   Lab Results   Component Value Date    IRON 95 11/14/2020        IONIZED CALCIUM   No results found for: MICK CHAVIS      Assessment:       Diagnosis Orders   1. S/P laparoscopic sleeve gastrectomy  CBC Auto Differential    Comprehensive Metabolic Panel    Prealbumin   2. BMI 45.0-49.9, adult (HCC)  CBC Auto Differential    Comprehensive Metabolic Panel    Prealbumin   3. Postsurgical malabsorption  CBC Auto Differential    Comprehensive Metabolic Panel    Prealbumin   4. Screening for malnutrition  CBC Auto Differential    Comprehensive Metabolic Panel    Prealbumin   5. Diabetes mellitus type II, non insulin dependent ([address]     6. Essential hypertension     7. Gastroesophageal reflux disease, unspecified whether esophagitis present     8. Hyperlipidemia, unspecified hyperlipidemia type     9. Depression, unspecified depression type       Plan:    Stay well hydrated. Drink a minimum of 64 oz of non-carbonated, non-caffeinated fluids daily. Nutritional education occurred during visit. Tolerating diet. Continue following with dietitian and follow their recommendations as directed. Continue  60-80  grams of protein each day. Signs and symptoms reviewed with patient that would be concerning and need her to return to office for re-evaluation. Patient will call if any questions or concerns arrise. No lifting, pushing, pulling over 20#  No abdominal exercises  Wear abdominal binder prn  Complete Lovenox as rx  Importance of physical activity discussed with patient.    Increase physical activity as tolerated  Continue taking Multivitamin, Calcium and B12 as directed  Continue PPI for at least 3 months post-op. Encouraged to attend support groups  Progress diet as tolerated per dietitian recommendations. 6 week labs ordered- to be drawn one week prior to next apt.  Monitor blood sugars- adjust medication with PCP as needed.  Monitor Blood pressure-adjust medication with PCP as needed  Return in about 1 month (around 11/25/2021) for postop follow up. I spent over 20 minutes with the patient, with greater that 50% of that time spent on face counseling for nutrition and exercise.     Electronically signed by NADEEM Muir on 10/25/2021 at 3:04 PM

## 2021-11-05 ENCOUNTER — PATIENT MESSAGE (OUTPATIENT)
Dept: BARIATRICS/WEIGHT MGMT | Age: 46
End: 2021-11-05

## 2021-11-18 ENCOUNTER — HOSPITAL ENCOUNTER (OUTPATIENT)
Age: 46
Discharge: HOME | End: 2021-11-18
Payer: COMMERCIAL

## 2021-11-18 DIAGNOSIS — K91.2 POSTSURGICAL MALABSORPTION: ICD-10-CM

## 2021-11-18 DIAGNOSIS — Z98.84 S/P LAPAROSCOPIC SLEEVE GASTRECTOMY: ICD-10-CM

## 2021-11-18 DIAGNOSIS — Z13.21 SCREENING FOR MALNUTRITION: ICD-10-CM

## 2021-11-18 LAB
ALBUMIN SERPL-MCNC: 4.3 G/DL (ref 3.5–5.1)
ALP BLD-CCNC: 71 U/L (ref 38–126)
ALT SERPL-CCNC: 27 U/L (ref 11–66)
ANION GAP SERPL CALCULATED.3IONS-SCNC: 12 MEQ/L (ref 8–16)
AST SERPL-CCNC: 20 U/L (ref 5–40)
BASOPHILS # BLD: 0.2 %
BASOPHILS ABSOLUTE: 0 THOU/MM3 (ref 0–0.1)
BILIRUB SERPL-MCNC: 0.5 MG/DL (ref 0.3–1.2)
BUN BLDV-MCNC: 14 MG/DL (ref 7–22)
CALCIUM SERPL-MCNC: 9.2 MG/DL (ref 8.5–10.5)
CHLORIDE BLD-SCNC: 105 MEQ/L (ref 98–111)
CO2: 23 MEQ/L (ref 23–33)
CREAT SERPL-MCNC: 0.8 MG/DL (ref 0.4–1.2)
EOSINOPHIL # BLD: 0.7 %
EOSINOPHILS ABSOLUTE: 0.1 THOU/MM3 (ref 0–0.4)
ERYTHROCYTE [DISTWIDTH] IN BLOOD BY AUTOMATED COUNT: 13.1 % (ref 11.5–14.5)
ERYTHROCYTE [DISTWIDTH] IN BLOOD BY AUTOMATED COUNT: 43.6 FL (ref 35–45)
GFR SERPL CREATININE-BSD FRML MDRD: 78 ML/MIN/1.73M2
GLUCOSE BLD-MCNC: 180 MG/DL (ref 70–108)
HCT VFR BLD CALC: 42.7 % (ref 37–47)
HEMOGLOBIN: 14.1 GM/DL (ref 12–16)
IMMATURE GRANS (ABS): 0.02 THOU/MM3 (ref 0–0.07)
IMMATURE GRANULOCYTES: 0.2 %
LYMPHOCYTES # BLD: 29.1 %
LYMPHOCYTES ABSOLUTE: 2.5 THOU/MM3 (ref 1–4.8)
MCH RBC QN AUTO: 30.3 PG (ref 26–33)
MCHC RBC AUTO-ENTMCNC: 33 GM/DL (ref 32.2–35.5)
MCV RBC AUTO: 91.8 FL (ref 81–99)
MONOCYTES # BLD: 5.4 %
MONOCYTES ABSOLUTE: 0.5 THOU/MM3 (ref 0.4–1.3)
NUCLEATED RED BLOOD CELLS: 0 /100 WBC
PLATELET # BLD: 263 THOU/MM3 (ref 130–400)
PMV BLD AUTO: 12 FL (ref 9.4–12.4)
POTASSIUM SERPL-SCNC: 3.8 MEQ/L (ref 3.5–5.2)
PREALBUMIN: 22.4 MG/DL (ref 20–40)
RBC # BLD: 4.65 MILL/MM3 (ref 4.2–5.4)
SEG NEUTROPHILS: 64.4 %
SEGMENTED NEUTROPHILS ABSOLUTE COUNT: 5.5 THOU/MM3 (ref 1.8–7.7)
SODIUM BLD-SCNC: 140 MEQ/L (ref 135–145)
TOTAL PROTEIN: 7.1 G/DL (ref 6.1–8)
WBC # BLD: 8.5 THOU/MM3 (ref 4.8–10.8)

## 2021-11-18 PROCEDURE — 84134 ASSAY OF PREALBUMIN: CPT

## 2021-11-18 PROCEDURE — 85025 COMPLETE CBC W/AUTO DIFF WBC: CPT

## 2021-11-18 PROCEDURE — 36415 COLL VENOUS BLD VENIPUNCTURE: CPT

## 2021-11-18 PROCEDURE — 80053 COMPREHEN METABOLIC PANEL: CPT

## 2021-11-19 ENCOUNTER — TELEPHONE (OUTPATIENT)
Dept: PHARMACY | Facility: CLINIC | Age: 46
End: 2021-11-19

## 2021-11-19 NOTE — TELEPHONE ENCOUNTER
According to our records, patient is missing the following requirements that must be completed by December 31st, 2021:   Program Requirements that need to be completed by December 31st, 2021 to remain eligible for program:     - Diabetes Visit with your physician in 2021 (Second yearly visit)     - Urine protein/Microalbumin (once yearly)        Left message for patient on TAD advising of the above information. Left our phone number: [phone] Option #3 if she has any questions/concerns. [employer] message sent.      [facility]   Phone: [phone]

## 2021-11-19 NOTE — PROGRESS NOTES
Patient is a 42 y.o. female seen for follow up MNT visit for six week post op . Vitals from current and previous visits:  Vitals 96/38/4575   SYSTOLIC 983   DIASTOLIC 72   Site Right Upper Arm   Position Sitting   Cuff Size Large Adult   Pulse 72   Temp 96.4   Resp 18   SpO2    Weight 286 lb 3.2 oz   Height 5' 5.25\"   Body mass index 47.26 kg/m2        Recent Post Op Lab Work:  GFR-78, glucose-180- pt plans to check blood sugars more often now   Lab Results   Component Value Date    VITD25 35 11/14/2020       Date of Surgery: 10/11/21  Type of Surgery: gastric sleeve    Obesity Classification: Class III    Initial Weight: 309 lbs at pre-op teaching class   Excess  Body Weight Pre-Op: 157  lbs     Weight Loss: 23 lbs. Patient's Target Weight =  152 lbs for a BMI of ~25  Percentage of Excess Body Weight Lost to date is :  14.6 %    How pleased are you with your current weight loss: Pt is pleased with weight loss and more energy      Are you experiencing any physical problems post surgery: No: - denies heartburn or reflux. Did have to take Dulcolax for bowels this weekend    Are you experiencing any dietary problems post surgery: No  Food Intolerances: Denies any food intolerances since last seen. How frequently are you eating? Four times a day   How long does it take you to finish a meal? Trying to take time to eat  How full do you feel after eating? \"I feel hungry quite a bit\"    Increased cottage cheese, canned chicken, canned tuna, eggs. Eating tortilla soup    Protein intake: 60-80 grams/day   Patient taking protein supplement: Yes. Brand of Supplement: Protein 2.0 clear water- 15 grams protein/juan, Unflavored Genepro 30 grams protein at least once a day. Pt reports did have a few NesQuik Protein Power shakes that did contain 38 grams sugar and higher calories. Pt now aware to avoid these. Fluid intake: >64 oz/day.    liquids from solids \"I just have to remember\"    Multivitamin/mineral intake: [place] Chewable with Iron twice a day = 36 grams iron/day- did not tolerate bariatric chewable MVI     Calcium intake: Yes. Calcium Citrate 500 mg BID - reviewed with patient switching to tablet form Calcium- Members Ramiro brand 600 mg take one tablet twice a day with food for best absorption     Other: B12 daily     Does patient exercise: Pt plans to start a drumming class    Assessment  Pt tolerating stage appropriate foods without difficulty. Continues to supplement with protein shakes and take vitamins as recommended      Plan  Plan/Recommendations: Pt educated on six week post op nutrition guidelines. Questions answered. See additional instructions below. -  Patient Instructions   1. Continue bariatric vitamins as you are currently taking. Ok to switch to pill form Calcium- Members Ramiro 600 mg tablet with food twice a day. Continue [place] Chewable with Iron twice a day. 2. Goal remains  60-80 grams protein per day. Focus on choosing protein foods first at meals. Suggest continue one-two protein shakes daily to meet this recommendation. 3. Increase physical activity to include cardiac and strength training now that you no longer have weight restrictions  4. Water goal is 64 oz per day and make sure no liquids 30 minutes before meals and no liquids 30 minutes after meals  5. Take 20-30 minutes to eat meals and chew all foods well for best tolerance especially as you introduce new foods. get lab work done 5-7 days before next office visit.             Recommended Follow-Up: three month post op follow up    Nohemi Wagner RD, LD,   Dietitian- Weight Management [facility] [address]

## 2021-11-22 ENCOUNTER — OFFICE VISIT (OUTPATIENT)
Dept: BARIATRICS/WEIGHT MGMT | Age: 46
End: 2021-11-22

## 2021-11-22 VITALS
RESPIRATION RATE: 18 BRPM | HEIGHT: 65 IN | WEIGHT: 286.2 LBS | BODY MASS INDEX: 47.68 KG/M2 | HEART RATE: 72 BPM | SYSTOLIC BLOOD PRESSURE: 126 MMHG | DIASTOLIC BLOOD PRESSURE: 72 MMHG | TEMPERATURE: 96.4 F

## 2021-11-22 DIAGNOSIS — Z13.21 SCREENING FOR MALNUTRITION: ICD-10-CM

## 2021-11-22 DIAGNOSIS — K91.2 POSTSURGICAL MALABSORPTION: ICD-10-CM

## 2021-11-22 DIAGNOSIS — F32.A DEPRESSION, UNSPECIFIED DEPRESSION TYPE: ICD-10-CM

## 2021-11-22 DIAGNOSIS — E11.9 DIABETES MELLITUS TYPE II, NON INSULIN DEPENDENT (HCC): ICD-10-CM

## 2021-11-22 DIAGNOSIS — I10 ESSENTIAL HYPERTENSION: ICD-10-CM

## 2021-11-22 DIAGNOSIS — Z98.84 S/P LAPAROSCOPIC SLEEVE GASTRECTOMY: Primary | ICD-10-CM

## 2021-11-22 DIAGNOSIS — K21.9 GASTROESOPHAGEAL REFLUX DISEASE, UNSPECIFIED WHETHER ESOPHAGITIS PRESENT: ICD-10-CM

## 2021-11-22 DIAGNOSIS — E78.5 HYPERLIPIDEMIA, UNSPECIFIED HYPERLIPIDEMIA TYPE: ICD-10-CM

## 2021-11-22 DIAGNOSIS — E66.01 MORBID OBESITY WITH BMI OF 45.0-49.9, ADULT (HCC): Primary | ICD-10-CM

## 2021-11-22 DIAGNOSIS — L30.4 INTERTRIGINOUS DERMATITIS ASSOCIATED WITH MOISTURE: ICD-10-CM

## 2021-11-22 PROCEDURE — 99024 POSTOP FOLLOW-UP VISIT: CPT | Performed by: PHYSICIAN ASSISTANT

## 2021-11-22 RX ORDER — OMEPRAZOLE 40 MG/1
40 CAPSULE, DELAYED RELEASE ORAL
Qty: 30 CAPSULE | Refills: 2 | Status: SHIPPED | OUTPATIENT
Start: 2021-11-22 | End: 2022-01-26

## 2021-11-22 RX ORDER — NYSTATIN 100000 [USP'U]/G
POWDER TOPICAL
Qty: 30 G | Refills: 1 | OUTPATIENT
Start: 2021-11-22 | End: 2021-12-14

## 2021-11-22 NOTE — PATIENT INSTRUCTIONS
Stay well hydrated. Drink a minimum of 64 oz of non-carbonated, non-caffeinated fluids daily. Nutritional education occurred during visit. Tolerating diet. Continue following with dietitian and follow their recommendations as directed. Continue  60-80  grams of protein each day. Signs and symptoms reviewed with patient that would be concerning and need her to return to office for re-evaluation. Patient will call if any questions or concerns arrise. Off all restrictions  Importance of physical activity discussed with patient. Increase physical activity as tolerated  Add strength training  Continue taking Multivitamin, Calcium and B12 as directed ( okay to switch to pill form)  Continue PPI for at least 3 months post-op- May swallow capsule without opening  Encouraged to attend support groups  Progress diet as tolerated per dietitian recommendations. 6 week labs reviewed with patient today  12 week labs ordered- to be drawn one week prior to next apt. Follow up in 6 weeks with provider and dietitian  Monitor blood sugars- adjust medication with PCP as needed.    Monitor Blood pressure-adjust medication with PCP as needed  Nystatin powder rx today

## 2021-11-22 NOTE — PATIENT INSTRUCTIONS
1. Continue bariatric vitamins as you are currently taking. Ok to switch to pill form Calcium- Members Ramiro 600 mg tablet with food twice a day. Continue [place] Chewable with Iron twice a day. 2. Goal remains  60-80 grams protein per day. Focus on choosing protein foods first at meals. Suggest continue one-two protein shakes daily to meet this recommendation. 3. Increase physical activity to include cardiac and strength training now that you no longer have weight restrictions  4. Water goal is 64 oz per day and make sure no liquids 30 minutes before meals and no liquids 30 minutes after meals  5. Take 20-30 minutes to eat meals and chew all foods well for best tolerance especially as you introduce new foods. get lab work done 5-7 days before next office visit.

## 2021-11-22 NOTE — PROGRESS NOTES
[facility]  [address]  [place]      Visit Date:  11/22/2021  Weight Management Postop Follow-up    HPI:      Chelsea Duffy is a 42 y.o. female who is here today for 6 weeks follow up since  robotic-assisted sleeve gastrectomy performed by Dr. Mona Ovalles  on 10/11/21. Feeling good overall. Weight today 286#. Down 7# since last visit. Down 20# since surgery. BMI 47 . Drinking 70-80 oz of fluid and 70-80 grams of protein daily. Drinking 2 protein shakes daily. Tolerating protein but not taste very sweet. No carbonation. No sweets. Tolerating post-op diet. No problems with bowel movements. Occasional use of Miralax. No nausea. No emesis. No GERD/ Reflux-continues to take PPI. Denies CP/SOB. No Dizziness. No abdominal pain. No incisional discomfort. No sx of dehydration. No fever or chills. Taking and tolerating all vitamins- [place] 2 daily/ Calcium 2 daily/ B12. Would like to switch to pill form. Depression stable with medication. BP stable with medication. Cholesterol stable with medication. 6 week labs reviewed. Glucose in labs 180- admits that she drank Nesquik protein shake with 38 grams of sugar prior to lab draw. Has not drank since labs resulted. Advised to avoid due to high sugar. Advised to start checking blood sugars and if elevated follow up with PCP. Also notes a rash underneath excess abdominal skin for the last couple days. Physical Activity:  Walking 7-8k steps daily. Plans to get started with drumming classes. Current BMI: Body mass index is 47.26 kg/m².   Current Weight:   Wt Readings from Last 3 Encounters:   11/22/21 286 lb 3.2 oz (129.8 kg)   10/25/21 293 lb (132.9 kg)   10/25/21 293 lb (132.9 kg)     Pre-op Body Weight:306      Past Medical History:  Past Medical History:   Diagnosis Date    Back pain     Body mass index 50.0-59.9, adult ([facility] [address] 06/23/2021    Depression     Diabetes mellitus ([facility] [address]  GERD (gastroesophageal reflux disease)     Hypercholesteremia     Hypertension     UTI (urinary tract infection)        Past Surgical History:  Past Surgical History:   Procedure Laterality Date    CHOLECYSTECTOMY      COLONOSCOPY      ENDOMETRIAL ABLATION      HYSTERECTOMY ABDOMINAL N/A 12/12/2017    ROBOTIC TOTAL HYSTERECTOMY WITH BILATERAL SALPINGECTOMY performed by Logan Ramesh MD at [facility]      vocal cord mass removed    OVARIAN CYST REMOVAL      SLEEVE GASTRECTOMY N/A 10/11/2021    GASTRECTOMY SLEEVE LAPAROSCOPIC ROBOTIC performed by Sharmin Velazquez MD at [address] N/A 2/9/2021    EGD BIOPSY performed by Can Ram MD at [facility]       Past Social History:  Social History     Socioeconomic History    Marital status:      Spouse name: Not on file    Number of children: Not on file    Years of education: Not on file    Highest education level: Not on file   Occupational History    Not on file   Tobacco Use    Smoking status: Never Smoker    Smokeless tobacco: Never Used   Vaping Use    Vaping Use: Never used   Substance and Sexual Activity    Alcohol use: No    Drug use: No    Sexual activity: Yes     Partners: Male   Other Topics Concern    Not on file   Social History Narrative    Not on file     Social Determinants of Health     Financial Resource Strain: Low Risk     Difficulty of Paying Living Expenses: Not hard at all   Food Insecurity: No Food Insecurity    Worried About [address] [employer] in the Last Year: Never true    [facility] in the Last Year: Never true   Transportation Needs:     Lack of Transportation (Medical): Not on file    Lack of Transportation (Non-Medical):  Not on file   Physical Activity:     Days of Exercise per Week: Not on file    Minutes of Exercise per Session: Not on file   Stress:     Feeling of Stress : Not on file   Social Connections:     Frequency of Communication with Friends and Family: Not on file    Frequency of Social Gatherings with Friends and Family: Not on file    Attends Confucianist Services: Not on file    Active Member of Clubs or Organizations: Not on file    Attends Club or Organization Meetings: Not on file    Marital Status: Not on file   Intimate Partner Violence:     Fear of Current or Ex-Partner: Not on file    Emotionally Abused: Not on file    Physically Abused: Not on file    Sexually Abused: Not on file   Housing Stability:     Unable to Pay for Housing in the Last Year: Not on file    Number of [place] in the Last Year: Not on file    Unstable Housing in the Last Year: Not on file        Medications:   Current Outpatient Medications   Medication Sig Dispense Refill    omeprazole (PRILOSEC) 40 MG delayed release capsule Take 1 capsule by mouth every morning (before breakfast) Open capsule post-op 30 capsule 2    nystatin (MYCOSTATIN) 671897 UNIT/GM powder Apply 3 times daily. 30 g 1    docusate sodium (COLACE) 100 MG capsule Take 100 mg by mouth 2 times daily      Multiple Vitamins-Minerals (CELEBRATE MULTI-COMPLETE 39) CHEW Take by mouth      Calcium Citrate-Vitamin D (CALCIUM + VIT D, BARIATRIC ADVANTAGE, CHEWABLE TABLET) Take 1 tablet by mouth daily      Cyanocobalamin (B-12 SL) Place 1 tablet under the tongue daily      atorvastatin (LIPITOR) 20 MG tablet TAKE 1 TABLET BY MOUTH ONE TIME A DAY 90 tablet 1    FLUoxetine (PROZAC) 20 MG capsule Take 3 capsules by mouth daily 360 capsule 3    lisinopril-hydroCHLOROthiazide (PRINZIDE;ZESTORETIC) 20-12.5 MG per tablet Take 1 tablet by mouth daily 90 tablet 3     No current facility-administered medications for this visit. Allergies:   No Known Allergies    Subjective:    Review of Systems:  Constitutional: Denies any fever, chills, fatigue. Wound: (+) rash, skin color changes or wound problems. Resp: Denies any cough, shortness of breath.   CV: Denies any chest pain, orthopnea or syncope. MS: Denies myalgias, arthralgias. GI: Denies any nausea, vomiting, diarrhea, constipation, melena, hematochezia. No incisional discomfort. : Denies any hematuria, hesitancy or dysuria. NEURO: Denies seizures, headache. .    Objective:    /72 (Site: Right Upper Arm, Position: Sitting, Cuff Size: Large Adult)   Pulse 72   Temp 96.4 °F (35.8 °C) (Infrared)   Resp 18   Ht 5' 5.25\" (1.657 m)   Wt 286 lb 3.2 oz (129.8 kg)   BMI 47.26 kg/m²     Physical Examination:   Constitutional: Alert and oriented to person, place and time. Well-developed, well- nourished. Head: Normocephalic and atraumatic  Neck: Supple. Eyes: EOMI b/l. Conjunctivae normal.  No scleral icterus. Respiratory: Effort normal. No respiratory distress. Abd: Incisions well healed. Non-tender. No sign of infection. Erythematous intertriginous rash. Ext:  Movement x 4. No edema  Skin; Warm and dry, no visible rashes, lesions or ulcers.    Neuro: Cranial Nerves Grossly Intact; nml coordination        Labs:  CBC   Lab Results   Component Value Date    WBC 8.5 11/18/2021    RBC 4.65 11/18/2021    HGB 14.1 11/18/2021    HCT 42.7 11/18/2021    MCV 91.8 11/18/2021    MCH 30.3 11/18/2021    MCHC 33.0 11/18/2021    RDW 13.1 12/30/2017     11/18/2021    MPV 12.0 11/18/2021    RBCMORP NORMAL 08/11/2017    SEGSPCT 64.4 11/18/2021    LABLYMP 29.1 11/18/2021    MONOPCT 5.4 11/18/2021    LABEOS 0.7 11/18/2021    BASO 0.2 11/18/2021    NRBC 0 11/18/2021    SEGSABS 5.5 11/18/2021    LYMPHSABS 2.5 11/18/2021    MONOSABS 0.5 11/18/2021    EOSABS 0.1 11/18/2021    BASOSABS 0.0 11/18/2021        BMP/CMP   Lab Results   Component Value Date    GLUCOSE 180 11/18/2021    CREATININE 0.8 11/18/2021    BUN 14 11/18/2021     11/18/2021    K 3.8 11/18/2021    K 4.1 10/12/2021     11/18/2021    CO2 23 11/18/2021    CALCIUM 9.2 11/18/2021    AST 20 11/18/2021    ALKPHOS 71 11/18/2021    PROT 7.1 11/18/2021 LABALBU 4.3 11/18/2021    BILITOT 0.5 11/18/2021    ALT 27 11/18/2021        PREALBUMIN   Lab Results   Component Value Date    PREALBUMIN 22.4 11/18/2021        VITAMIN B12   Lab Results   Component Value Date    MKIFZTPL96 415 11/14/2020        24 HOUR URINE CALCIUM   No results found for: Verdia Median, CALCIUMUR     VITAMIN D   Lab Results   Component Value Date    VITD25 35 11/14/2020        VITAMIN B1/ THIAMINE   Lab Results   Component Value Date    [id number] 144 11/14/2020        RBC FOLATE   Lab Results   Component Value Date    FOLATE > 20.0 11/14/2020        LIPID SCREEN (FASTING)   Lab Results   Component Value Date    CHOL 206 08/25/2021    TRIG 284 08/25/2021    HDL 41 08/25/2021    LDLCALC 108 08/25/2021   ,     HGA1C (T2DM ONLY)   Lab Results   Component Value Date    LABA1C 6.8 08/25/2021    AVGG 162 06/30/2021        TSH   Lab Results   Component Value Date    TSH 2.570 11/14/2020        IRON   Lab Results   Component Value Date    IRON 95 11/14/2020        IONIZED CALCIUM   No results found for: MICK CHAVIS      Assessment:       Diagnosis Orders   1. S/P laparoscopic sleeve gastrectomy  CBC Auto Differential    Comprehensive Metabolic Panel    Prealbumin    Vitamin B1    Vitamin D 25 Hydroxy    Hemoglobin A1C   2. BMI 45.0-49.9, adult (HCC)  CBC Auto Differential    Comprehensive Metabolic Panel    Prealbumin    Vitamin B1    Vitamin D 25 Hydroxy    Hemoglobin A1C   3. Postsurgical malabsorption  CBC Auto Differential    Comprehensive Metabolic Panel    Prealbumin    Vitamin B1    Vitamin D 25 Hydroxy    Hemoglobin A1C   4. Screening for malnutrition  CBC Auto Differential    Comprehensive Metabolic Panel    Prealbumin    Vitamin B1    Vitamin D 25 Hydroxy    Hemoglobin A1C   5. Diabetes mellitus type II, non insulin dependent (HCC)  Hemoglobin A1C   6. Gastroesophageal reflux disease, unspecified whether esophagitis present  omeprazole (PRILOSEC) 40 MG delayed release capsule   7. Hyperlipidemia, unspecified hyperlipidemia type     8. Depression, unspecified depression type     9. Essential hypertension     10. Intertriginous dermatitis associated with moisture       Plan:    Stay well hydrated. Drink a minimum of 64 oz of non-carbonated, non-caffeinated fluids daily. Nutritional education occurred during visit. Tolerating diet. Continue following with dietitian and follow their recommendations as directed. Continue  60-80  grams of protein each day. Signs and symptoms reviewed with patient that would be concerning and need her to return to office for re-evaluation. Patient will call if any questions or concerns arrise. Off all restrictions  Importance of physical activity discussed with patient. Increase physical activity as tolerated  Add strength training  Continue taking Multivitamin, Calcium and B12 as directed ( okay to switch to pill form)  Continue PPI for at least 3 months post-op- May swallow capsule without opening  Encouraged to attend support groups  Progress diet as tolerated per dietitian recommendations. 6 week labs reviewed with patient today  12 week labs ordered- to be drawn one week prior to next apt. Follow up in 6 weeks with provider and dietitian  Monitor blood sugars- adjust medication with PCP as needed. Monitor Blood pressure-adjust medication with PCP as needed  Nystatin powder rx today  Return in about 6 weeks (around 1/3/2022) for postop follow up. I spent over 20 minutes with the patient, with greater that 50% of that time spent on face counseling for nutrition and exercise.     Electronically signed by NADEEM Butt on 11/22/2021 at 3:53 PM

## 2021-12-14 ENCOUNTER — HOSPITAL ENCOUNTER (EMERGENCY)
Age: 46
Discharge: HOME | End: 2021-12-14
Payer: COMMERCIAL

## 2021-12-14 VITALS
OXYGEN SATURATION: 97 % | HEART RATE: 75 BPM | DIASTOLIC BLOOD PRESSURE: 67 MMHG | HEIGHT: 66 IN | SYSTOLIC BLOOD PRESSURE: 121 MMHG | TEMPERATURE: 96.6 F | BODY MASS INDEX: 43.97 KG/M2 | WEIGHT: 273.6 LBS | RESPIRATION RATE: 14 BRPM

## 2021-12-14 DIAGNOSIS — J01.90 ACUTE VIRAL SINUSITIS: Primary | ICD-10-CM

## 2021-12-14 DIAGNOSIS — B97.89 ACUTE VIRAL SINUSITIS: Primary | ICD-10-CM

## 2021-12-14 PROCEDURE — 99213 OFFICE O/P EST LOW 20 MIN: CPT

## 2021-12-14 PROCEDURE — 99213 OFFICE O/P EST LOW 20 MIN: CPT | Performed by: NURSE PRACTITIONER

## 2021-12-14 PROCEDURE — 87636 SARSCOV2 & INF A&B AMP PRB: CPT

## 2021-12-14 RX ORDER — AZELASTINE 1 MG/ML
1 SPRAY, METERED NASAL 2 TIMES DAILY
Qty: 30 ML | Refills: 0 | Status: SHIPPED | OUTPATIENT
Start: 2021-12-14 | End: 2022-01-26

## 2021-12-14 RX ORDER — PSEUDOEPHEDRINE HYDROCHLORIDE 30 MG/1
30 TABLET ORAL EVERY 6 HOURS PRN
Refills: 0 | COMMUNITY
Start: 2021-12-14 | End: 2021-12-17

## 2021-12-14 ASSESSMENT — ENCOUNTER SYMPTOMS
SWOLLEN GLANDS: 0
SHORTNESS OF BREATH: 0
SINUS PAIN: 1
DIARRHEA: 0
NAUSEA: 0
RHINORRHEA: 1
SORE THROAT: 1
WHEEZING: 0
APNEA: 0
CHEST TIGHTNESS: 0
COUGH: 1
CHOKING: 0
VOMITING: 0
ABDOMINAL PAIN: 0
STRIDOR: 0

## 2021-12-14 ASSESSMENT — PAIN DESCRIPTION - FREQUENCY: FREQUENCY: CONTINUOUS

## 2021-12-14 ASSESSMENT — PAIN SCALES - GENERAL: PAINLEVEL_OUTOF10: 2

## 2021-12-14 ASSESSMENT — PAIN DESCRIPTION - PAIN TYPE: TYPE: ACUTE PAIN

## 2021-12-14 ASSESSMENT — PAIN DESCRIPTION - LOCATION: LOCATION: THROAT;EAR

## 2021-12-14 ASSESSMENT — PAIN DESCRIPTION - DESCRIPTORS: DESCRIPTORS: ACHING

## 2021-12-14 NOTE — ED PROVIDER NOTES
[facility]  Urgent Care Encounter      CHIEF COMPLAINT       Chief Complaint   Patient presents with    Sinusitis     teeth pain     Otalgia    Cough     at night     Pharyngitis       Nurses Notes reviewed and I agree except as noted in the HPI. HISTORY OFPRESENT ILLNESS   Mark Anandanacnatalia is a 42 y.o. The history is provided by the patient. No  was used. URI  Presenting symptoms: congestion, cough, ear pain, facial pain, rhinorrhea and sore throat    Presenting symptoms: no fatigue and no fever    Severity:  Moderate  Onset quality:  Sudden  Duration:  4 days  Progression:  Waxing and waning  Chronicity:  New  Relieved by:  Nothing  Worsened by:  Nothing  Ineffective treatments:  None tried  Associated symptoms: headaches and sinus pain    Associated symptoms: no arthralgias, no myalgias, no neck pain, no sneezing, no swollen glands and no wheezing    Risk factors: sick contacts    Risk factors: not elderly, no chronic cardiac disease, no chronic kidney disease, no chronic respiratory disease, no diabetes mellitus, no immunosuppression, no recent illness and no recent travel        REVIEW OF SYSTEMS     Review of Systems   Constitutional: Negative for activity change, appetite change, chills, diaphoresis, fatigue and fever. HENT: Positive for congestion, ear pain, postnasal drip, rhinorrhea, sinus pain and sore throat. Negative for sneezing. Respiratory: Positive for cough. Negative for apnea, choking, chest tightness, shortness of breath, wheezing and stridor. Cardiovascular: Negative for chest pain, palpitations and leg swelling. Gastrointestinal: Negative for abdominal pain, diarrhea, nausea and vomiting. Musculoskeletal: Negative for arthralgias, myalgias and neck pain. Neurological: Positive for headaches. Negative for dizziness and light-headedness.        PAST MEDICAL HISTORY         Diagnosis Date    Back pain     Body mass index 50. 0-59.9, adult ([facility] 06/23/2021    Depression     Diabetes mellitus ([facility]     GERD (gastroesophageal reflux disease)     Hypercholesteremia     Hypertension     UTI (urinary tract infection)        SURGICAL HISTORY     Patient  has a past surgical history that includes Endometrial ablation; other surgical history; Cholecystectomy; HYSTERECTOMY ABDOMINAL (N/A, 12/12/2017); ovarian cyst removal; Upper gastrointestinal endoscopy (N/A, 2/9/2021); Colonoscopy; and Sleeve Gastrectomy (N/A, 10/11/2021). CURRENT MEDICATIONS       Previous Medications    ACETAMINOPHEN (TYLENOL ARTHRITIS PAIN PO)    Take by mouth    ATORVASTATIN (LIPITOR) 20 MG TABLET    TAKE 1 TABLET BY MOUTH ONE TIME A DAY    CALCIUM CITRATE-VITAMIN D (CALCIUM + VIT D, BARIATRIC ADVANTAGE, CHEWABLE TABLET)    Take 1 tablet by mouth daily    CYANOCOBALAMIN (B-12 SL)    Place 1 tablet under the tongue daily    DOCUSATE SODIUM (COLACE) 100 MG CAPSULE    Take 100 mg by mouth 2 times daily    FLUOXETINE (PROZAC) 20 MG CAPSULE    Take 3 capsules by mouth daily    LISINOPRIL-HYDROCHLOROTHIAZIDE (PRINZIDE;ZESTORETIC) 20-12.5 MG PER TABLET    Take 1 tablet by mouth daily    MULTIPLE VITAMINS-MINERALS (CELEBRATE MULTI-COMPLETE 45) CHEW    Take by mouth    OMEPRAZOLE (PRILOSEC) 40 MG DELAYED RELEASE CAPSULE    Take 1 capsule by mouth every morning (before breakfast) Open capsule post-op       ALLERGIES     Patient is has No Known Allergies. FAMILY HISTORY     Patient's family history includes Cancer in her maternal grandfather and maternal grandmother; Heart Disease in her father; High Blood Pressure in her father; No Known Problems in her brother and sister; Obesity in her mother. SOCIAL HISTORY     Patient  reports that she has never smoked. She has never used smokeless tobacco. She reports that she does not drink alcohol and does not use drugs.     PHYSICAL EXAM     ED TRIAGE VITALS  BP: 121/67, Temp: 96.6 °F (35.9 °C), Pulse: 75, Resp: 14, SpO2: 97 %  Physical Exam  Vitals and nursing note reviewed. Constitutional:       General: She is not in acute distress. Appearance: She is well-developed. She is obese. She is not ill-appearing, toxic-appearing or diaphoretic. HENT:      Head: Normocephalic and atraumatic. Right Ear: Ear canal and external ear normal. No drainage, swelling or tenderness. No middle ear effusion. Tympanic membrane is bulging. Tympanic membrane is not erythematous. Left Ear: Ear canal and external ear normal. No drainage, swelling or tenderness. No middle ear effusion. Tympanic membrane is bulging. Tympanic membrane is not erythematous. Nose: Congestion and rhinorrhea present. Mouth/Throat:      Mouth: Mucous membranes are moist. No oral lesions. Pharynx: Posterior oropharyngeal erythema present. No pharyngeal swelling, oropharyngeal exudate or uvula swelling. Eyes:      Extraocular Movements: Extraocular movements intact. Conjunctiva/sclera: Conjunctivae normal.   Pulmonary:      Effort: Pulmonary effort is normal. No respiratory distress. Breath sounds: Normal breath sounds. No stridor. No wheezing, rhonchi or rales. Chest:      Chest wall: No tenderness. Musculoskeletal:         General: Normal range of motion. Cervical back: Normal range of motion and neck supple. Skin:     General: Skin is warm and dry. Capillary Refill: Capillary refill takes less than 2 seconds. Neurological:      General: No focal deficit present. Mental Status: She is alert and oriented to person, place, and time. Psychiatric:         Mood and Affect: Mood normal.         Behavior: Behavior normal.         Thought Content: Thought content normal.         Judgment: Judgment normal.         DIAGNOSTIC RESULTS   Labs:No results found for this visit on 12/14/21.     IMAGING:  No orders to display     URGENT CARE COURSE:     Vitals:    12/14/21 1202   BP: 121/67   Pulse: 75   Resp: 14   Temp: 96.6 °F (35.9 °C)   TempSrc: Temporal   SpO2: 97%   Weight: 273 lb 9.6 oz (124.1 kg)   Height: 5' 6\" (1.676 m)       Medications - No data to display  PROCEDURES:  None  FINAL IMPRESSION      1. Acute viral sinusitis        DISPOSITION/PLAN   Decision To Discharge    Drink lots of fluids  Take Motrin or Tylenol for headache  Humidification of air  Use nasal spray as directed  Take a nasal decongestant as directed  Monitor for any fever increased and sinus pain or pressure  Follow-up see her primary care provider in 48 hours  Or go to the emergency department for any changes or concerns.       PATIENT REFERRED TO:  THADDEUS Spencer CNP  [address] [place] [address], [address] [place] [address] Rd  [address] [facility]  [phone]    Schedule an appointment as soon as possible for a visit       DISCHARGE MEDICATIONS:  New Prescriptions    AZELASTINE (ASTELIN) 0.1 % NASAL SPRAY    1 spray by Nasal route 2 times daily Use in each nostril as directed    PSEUDOEPHEDRINE (SUDAFED) 30 MG TABLET    Take 1 tablet by mouth every 6 hours as needed for Congestion (headache, earach)     Current Discharge Medication List          THADDEUS Mata CNP, APRN - [phone]  12/14/21 5521

## 2021-12-14 NOTE — ED NOTES
Pt verbalized discharge instructions. Pt informed to go to ER if develop chest pain, shortness of breath or abdominal pain. Pt ambulatory out in stable condition. Assessment unchanged.        Deepthi Jang RN  12/14/21 [id number]

## 2021-12-14 NOTE — ED TRIAGE NOTES
Pt ambulatory into esuc with c/o sinus drainage and pressure, sore throat, bilateral ear pain and cugh at night for the past four days. Pt states last month her family had covid. Pt states pain 2.

## 2021-12-14 NOTE — Clinical Note
Neo Jones was seen and treated in our emergency department on 12/14/2021. She may return to work on 12/15/2021. Pending results for influenza and COVID-19     If you have any questions or concerns, please don't hesitate to call.       [place] Lockhart, APRN - CNP

## 2021-12-15 ENCOUNTER — PATIENT MESSAGE (OUTPATIENT)
Dept: FAMILY MEDICINE CLINIC | Age: 46
End: 2021-12-15

## 2021-12-15 LAB
INFLUENZA A: NOT DETECTED
INFLUENZA B: NOT DETECTED
SARS-COV-2 RNA, RT PCR: NOT DETECTED

## 2022-01-17 NOTE — TELEPHONE ENCOUNTER
Override given to patient for Urine Albumin due to her taking an ACE/ARB. Per 7/14/21 Physical Encounter patient was advised to RTO in 1 year:    Plan:   Chronic conditions stable  Labs reviewed  Patient's HTN and diabetes are controlled and cleared for surgery  No medication changes              - continue Farxiga through Liquid Diet  Refills as above  Follow up with Specialists  RTO in 1 year                         Lisa Muñoz APRN - CNP      Patient given an override for 2nd 2021 DM OV due to above information.

## 2022-01-19 ENCOUNTER — TELEPHONE (OUTPATIENT)
Dept: PHARMACY | Facility: CLINIC | Age: 47
End: 2022-01-19

## 2022-01-19 NOTE — TELEPHONE ENCOUNTER
Left message on voicemail for patient to call us back at [phone] option 3 to schedule your 2022 Be Well With Diabetes annual pharmacist telephone appointment.

## 2022-01-19 NOTE — LETTER
Sanjuana Yarbrough  [address], [address]  Phone: toll free [phone] Option #3      Denzel Rush   [address] [address]         01/25/22     Dear Denzel Rush,    Congratulations! You have completed the 2021 requirements for the [facility] Be Well With Diabetes Program. You have been automatically re-enrolled into the [facility] Be Well With Diabetes Program for 2022. One of the requirements to participate in the [facility] Be Well With Diabetes Program is to complete a Clinical Pharmacist Telephone appointment yearly. The [facility]  Team has attempted to contact you to schedule your 2022 Diabetes Management telephone appointment but was unable to reach you. We would like to work with you and your doctor to:  - Review your medications, including over-the-counter and herbal medications  - Answer questions about your medications and how to get the most benefit from them  - Identify potential drug interactions or side effects and help fix them  - Identify preferred medications that are equally effective, but available at a lower cost to you  - Help you reach the necessary requirements to remain enrolled in the Diabetes Management Program offered by [facility]     Please call [phone] and select option #3 to schedule this appointment to take advantage of this service. Telephone appointments are available Monday thru Friday from 7:30 AM till 5:30 PM.     This is a courtesy reminder. If you have this appointment already scheduled for your 2022 enrollment in the program, please disregard this message. If you have not scheduled this appointment yet, please contact us at the above number to schedule.      Sincerely,      [address]  Phone: [phone] Option #3
19-Sep-2020 11:25

## 2022-01-24 ENCOUNTER — HOSPITAL ENCOUNTER (OUTPATIENT)
Age: 47
Discharge: HOME | End: 2022-01-24
Payer: COMMERCIAL

## 2022-01-24 DIAGNOSIS — Z98.84 S/P LAPAROSCOPIC SLEEVE GASTRECTOMY: ICD-10-CM

## 2022-01-24 DIAGNOSIS — Z13.21 SCREENING FOR MALNUTRITION: ICD-10-CM

## 2022-01-24 DIAGNOSIS — K91.2 POSTSURGICAL MALABSORPTION: ICD-10-CM

## 2022-01-24 DIAGNOSIS — E11.9 DIABETES MELLITUS TYPE II, NON INSULIN DEPENDENT (HCC): ICD-10-CM

## 2022-01-24 LAB
ALBUMIN SERPL-MCNC: 4.3 G/DL (ref 3.5–5.1)
ALP BLD-CCNC: 101 U/L (ref 38–126)
ALT SERPL-CCNC: 43 U/L (ref 11–66)
ANION GAP SERPL CALCULATED.3IONS-SCNC: 11 MEQ/L (ref 8–16)
AST SERPL-CCNC: 34 U/L (ref 5–40)
AVERAGE GLUCOSE: 111 MG/DL (ref 70–126)
BASOPHILS # BLD: 0.3 %
BASOPHILS ABSOLUTE: 0 THOU/MM3 (ref 0–0.1)
BILIRUB SERPL-MCNC: 0.3 MG/DL (ref 0.3–1.2)
BUN BLDV-MCNC: 17 MG/DL (ref 7–22)
CALCIUM SERPL-MCNC: 8.9 MG/DL (ref 8.5–10.5)
CHLORIDE BLD-SCNC: 104 MEQ/L (ref 98–111)
CO2: 23 MEQ/L (ref 23–33)
CREAT SERPL-MCNC: 0.9 MG/DL (ref 0.4–1.2)
EOSINOPHIL # BLD: 0.7 %
EOSINOPHILS ABSOLUTE: 0.1 THOU/MM3 (ref 0–0.4)
ERYTHROCYTE [DISTWIDTH] IN BLOOD BY AUTOMATED COUNT: 12.4 % (ref 11.5–14.5)
ERYTHROCYTE [DISTWIDTH] IN BLOOD BY AUTOMATED COUNT: 45.1 FL (ref 35–45)
GFR SERPL CREATININE-BSD FRML MDRD: 68 ML/MIN/1.73M2
GLUCOSE BLD-MCNC: 137 MG/DL (ref 70–108)
HBA1C MFR BLD: 5.7 % (ref 4.4–6.4)
HCT VFR BLD CALC: 46.6 % (ref 37–47)
HEMOGLOBIN: 14.7 GM/DL (ref 12–16)
IMMATURE GRANS (ABS): 0.03 THOU/MM3 (ref 0–0.07)
IMMATURE GRANULOCYTES: 0.3 %
LYMPHOCYTES # BLD: 31.3 %
LYMPHOCYTES ABSOLUTE: 3.1 THOU/MM3 (ref 1–4.8)
MCH RBC QN AUTO: 31.1 PG (ref 26–33)
MCHC RBC AUTO-ENTMCNC: 31.5 GM/DL (ref 32.2–35.5)
MCV RBC AUTO: 98.7 FL (ref 81–99)
MONOCYTES # BLD: 5.3 %
MONOCYTES ABSOLUTE: 0.5 THOU/MM3 (ref 0.4–1.3)
NUCLEATED RED BLOOD CELLS: 0 /100 WBC
PLATELET # BLD: 274 THOU/MM3 (ref 130–400)
PMV BLD AUTO: 12 FL (ref 9.4–12.4)
POTASSIUM SERPL-SCNC: 4.2 MEQ/L (ref 3.5–5.2)
PREALBUMIN: 23.6 MG/DL (ref 20–40)
RBC # BLD: 4.72 MILL/MM3 (ref 4.2–5.4)
SEG NEUTROPHILS: 62.1 %
SEGMENTED NEUTROPHILS ABSOLUTE COUNT: 6.1 THOU/MM3 (ref 1.8–7.7)
SODIUM BLD-SCNC: 138 MEQ/L (ref 135–145)
TOTAL PROTEIN: 6.9 G/DL (ref 6.1–8)
VITAMIN D 25-HYDROXY: 36 NG/ML (ref 30–100)
WBC # BLD: 9.9 THOU/MM3 (ref 4.8–10.8)

## 2022-01-24 PROCEDURE — 82306 VITAMIN D 25 HYDROXY: CPT

## 2022-01-24 PROCEDURE — 80053 COMPREHEN METABOLIC PANEL: CPT

## 2022-01-24 PROCEDURE — 84425 ASSAY OF VITAMIN B-1: CPT

## 2022-01-24 PROCEDURE — 84134 ASSAY OF PREALBUMIN: CPT

## 2022-01-24 PROCEDURE — 36415 COLL VENOUS BLD VENIPUNCTURE: CPT

## 2022-01-24 PROCEDURE — 83036 HEMOGLOBIN GLYCOSYLATED A1C: CPT

## 2022-01-24 PROCEDURE — 85025 COMPLETE CBC W/AUTO DIFF WBC: CPT

## 2022-01-25 NOTE — TELEPHONE ENCOUNTER
For [place] in place:  No   Recommendation Provided To: Patient/Caregiver: 1 via Telephone and Bryanna Chung 20 Intervention Detail: Scheduled Appointment   Gap Closed?: No    Intervention Accepted By: Patient/Caregiver: 0   Time Spent (min): 15

## 2022-01-26 ENCOUNTER — OFFICE VISIT (OUTPATIENT)
Dept: FAMILY MEDICINE CLINIC | Age: 47
End: 2022-01-26
Payer: COMMERCIAL

## 2022-01-26 VITALS
BODY MASS INDEX: 43.84 KG/M2 | HEART RATE: 76 BPM | WEIGHT: 271.6 LBS | DIASTOLIC BLOOD PRESSURE: 74 MMHG | SYSTOLIC BLOOD PRESSURE: 122 MMHG | RESPIRATION RATE: 12 BRPM

## 2022-01-26 DIAGNOSIS — Z00.00 WELL ADULT EXAM: Primary | ICD-10-CM

## 2022-01-26 DIAGNOSIS — Z90.3 S/P GASTRIC SLEEVE PROCEDURE: ICD-10-CM

## 2022-01-26 DIAGNOSIS — F33.42 RECURRENT MAJOR DEPRESSIVE DISORDER, IN FULL REMISSION (HCC): ICD-10-CM

## 2022-01-26 DIAGNOSIS — E66.01 MORBID OBESITY WITH BMI OF 40.0-44.9, ADULT (HCC): ICD-10-CM

## 2022-01-26 PROBLEM — E11.9 TYPE 2 DIABETES MELLITUS WITHOUT COMPLICATION, WITHOUT LONG-TERM CURRENT USE OF INSULIN (HCC): Status: RESOLVED | Noted: 2020-11-11 | Resolved: 2022-01-26

## 2022-01-26 PROBLEM — I10 ESSENTIAL HYPERTENSION: Status: RESOLVED | Noted: 2020-11-11 | Resolved: 2022-01-26

## 2022-01-26 PROCEDURE — 99396 PREV VISIT EST AGE 40-64: CPT | Performed by: NURSE PRACTITIONER

## 2022-01-26 ASSESSMENT — PATIENT HEALTH QUESTIONNAIRE - PHQ9
3. TROUBLE FALLING OR STAYING ASLEEP: 0
SUM OF ALL RESPONSES TO PHQ QUESTIONS 1-9: 0
2. FEELING DOWN, DEPRESSED OR HOPELESS: 0
5. POOR APPETITE OR OVEREATING: 0
SUM OF ALL RESPONSES TO PHQ QUESTIONS 1-9: 0
SUM OF ALL RESPONSES TO PHQ QUESTIONS 1-9: 0
4. FEELING TIRED OR HAVING LITTLE ENERGY: 0
10. IF YOU CHECKED OFF ANY PROBLEMS, HOW DIFFICULT HAVE THESE PROBLEMS MADE IT FOR YOU TO DO YOUR WORK, TAKE CARE OF THINGS AT HOME, OR GET ALONG WITH OTHER PEOPLE: 0
1. LITTLE INTEREST OR PLEASURE IN DOING THINGS: 0
SUM OF ALL RESPONSES TO PHQ QUESTIONS 1-9: 0
SUM OF ALL RESPONSES TO PHQ9 QUESTIONS 1 & 2: 0
6. FEELING BAD ABOUT YOURSELF - OR THAT YOU ARE A FAILURE OR HAVE LET YOURSELF OR YOUR FAMILY DOWN: 0
8. MOVING OR SPEAKING SO SLOWLY THAT OTHER PEOPLE COULD HAVE NOTICED. OR THE OPPOSITE, BEING SO FIGETY OR RESTLESS THAT YOU HAVE BEEN MOVING AROUND A LOT MORE THAN USUAL: 0
9. THOUGHTS THAT YOU WOULD BE BETTER OFF DEAD, OR OF HURTING YOURSELF: 0
7. TROUBLE CONCENTRATING ON THINGS, SUCH AS READING THE NEWSPAPER OR WATCHING TELEVISION: 0

## 2022-01-26 ASSESSMENT — ENCOUNTER SYMPTOMS
SHORTNESS OF BREATH: 0
NAUSEA: 0
COUGH: 0
ABDOMINAL PAIN: 0

## 2022-01-26 NOTE — PROGRESS NOTES
Subjective:      Patient ID: Emilia Coy is a 42 y.o. female. HPI: Annual Exam    Chief Complaint   Patient presents with    Follow-up    Results       Gastric Sleeve in Fall 2021. DM, HTN and LIPID have resolved. No longer on medications related to. 44# lost since surgery. Feels great. Energy levels improved. Wt Readings from Last 3 Encounters:   01/26/22 271 lb 9.6 oz (123.2 kg)   12/14/21 273 lb 9.6 oz (124.1 kg)   11/22/21 286 lb 3.2 oz (129.8 kg)     Body mass index is 43.84 kg/m². Patient Active Problem List   Diagnosis    Recurrent major depressive disorder, in full remission ([facility] [address]     Weight Management - Jose Blankenshipor Sit    Denies CP, SOB or chest tightness    Wt Readings from Last 3 Encounters:   01/26/22 271 lb 9.6 oz (123.2 kg)   12/14/21 273 lb 9.6 oz (124.1 kg)   11/22/21 286 lb 3.2 oz (129.8 kg)       Mood stable. Prozac 60 mg    BP Readings from Last 3 Encounters:   01/26/22 122/74   12/14/21 121/67   11/22/21 126/72       No longer on BP, DM or LIPID medcations. Labs reviewed.      Lab Results   Component Value Date    LABA1C 5.7 01/24/2022    LABA1C 6.8 (H) 08/25/2021    LABA1C 7.4 (H) 06/30/2021     No results found for: EAG    No components found for: CHLPL  Lab Results   Component Value Date    TRIG 284 (H) 08/25/2021    TRIG 274 (H) 11/14/2020    TRIG 354 (H) 01/12/2019     Lab Results   Component Value Date    HDL 41 08/25/2021    HDL 42 11/14/2020    HDL 45 01/12/2019     Lab Results   Component Value Date    LDLCALC 108 08/25/2021    LDLCALC 143 11/14/2020    LDLCALC 137 01/12/2019     No results found for: LABVLDL      Chemistry        Component Value Date/Time     01/24/2022 1351    K 4.2 01/24/2022 1351    K 4.1 10/12/2021 0614     01/24/2022 1351    CO2 23 01/24/2022 1351    BUN 17 01/24/2022 1351    CREATININE 0.9 01/24/2022 1351        Component Value Date/Time    CALCIUM 8.9 01/24/2022 1351    ALKPHOS 101 01/24/2022 1351    AST 34 01/24/2022 1351    ALT 43 01/24/2022 1351    BILITOT 0.3 01/24/2022 1351            Lab Results   Component Value Date    TSH 2.570 11/14/2020       Lab Results   Component Value Date    WBC 9.9 01/24/2022    HGB 14.7 01/24/2022    HCT 46.6 01/24/2022    MCV 98.7 01/24/2022     01/24/2022         Health Maintenance   Topic Date Due    Hepatitis C screen  Never done    COVID-19 Vaccine (1) Never done    Depression Monitoring  Never done    HIV screen  Never done    Hepatitis B vaccine (1 of 3 - Risk 3-dose series) Never done    Diabetic microalbuminuria test  01/12/2020    DTaP/Tdap/Td vaccine (2 - Td or Tdap) 03/07/2020    Flu vaccine (1) 09/01/2021    Diabetic foot exam  07/14/2022    Diabetic retinal exam  08/18/2022    Lipid screen  08/25/2022    A1C test (Diabetic or Prediabetic)  01/24/2023    Potassium monitoring  01/24/2023    Creatinine monitoring  01/24/2023    Pneumococcal 0-64 years Vaccine (2 of 2 - PPSV23) 05/25/2042    Hepatitis A vaccine  Aged Out    Hib vaccine  Aged Out    Meningococcal (ACWY) vaccine  Aged [employer] History   Administered Date(s) Administered    Hepatitis A Adult (Havrix, Vaqta) 06/20/2019    Influenza Virus Vaccine 10/15/2020    Pneumococcal Polysaccharide (Xtsoavkeh13) 08/01/2017    Tdap (Boostrix, Adacel) 03/07/2010           Review of Systems   Constitutional: Negative for chills and fever. HENT: Negative. Respiratory: Negative for cough and shortness of breath. Cardiovascular: Negative for chest pain. Gastrointestinal: Negative for abdominal pain and nausea. Skin: Negative for rash. Neurological: Negative for dizziness, light-headedness and headaches. Psychiatric/Behavioral: Negative. Objective:   Physical Exam  Constitutional:       General: She is not in acute distress. Eyes:      Pupils: Pupils are equal, round, and reactive to light. Cardiovascular:      Rate and Rhythm: Normal rate and regular rhythm. Heart sounds: No murmur heard. Pulmonary:      Effort: Pulmonary effort is normal.      Breath sounds: Normal breath sounds. No wheezing. Abdominal:      General: Bowel sounds are normal. There is no distension. Palpations: Abdomen is soft. Tenderness: There is no abdominal tenderness. Musculoskeletal:         General: No tenderness. Normal range of motion. Cervical back: Normal range of motion and neck supple. Skin:     General: Skin is warm and dry. Findings: No rash. Assessment:       Diagnosis Orders   1. Well adult exam     2. Morbid obesity with BMI of 40.0-44.9, adult ([facility] [address]     3. S/P gastric sleeve procedure     4.  Recurrent major depressive disorder, in full remission ([facility] [address]             Plan:      Chronic conditions stable with resolution of DM, HTN and LIPID  44# lost to date  Healthy Lifestyles discussed  Labs reviewed  Stay active  Follow up with Weight Management  RTO PRN            Charlene Gutierrez, APRN - CNP

## 2022-01-28 LAB — VITAMIN B1 WHOLE BLOOD: 134 NMOL/L (ref 70–180)

## 2022-01-28 NOTE — PROGRESS NOTES
Patient is a 42 y.o. female seen for follow up MNT visit for ~ three month post op . Vitals from current and previous visits:  Vitals 9/06/4280   SYSTOLIC 076   DIASTOLIC 84   Site Right Upper Arm   Position Sitting   Cuff Size Large Adult   Pulse 84   Temp    Resp 18   SpO2    Weight 269 lb 3.2 oz   Height 5' 5.25\"   Body mass index 44.45 kg/m2   Pain Level         Recent Post Op Lab Work:  Three month lab work- GFR-68, A1C-5.7%, B1-134  Lab Results   Component Value Date    VITD25 36 01/24/2022       Date of Surgery: 10/11/21  Type of Surgery: gastric sleeve    Obesity Classification: Class III    Initial Weight: 309 lbs at pre-op teaching class   Excess  Body Weight Pre-Op: 157  lbs     Weight Loss: 40 lbs. Patient's Target Weight =  152 lbs for a BMI of ~25  Percentage of Excess Body Weight Lost to date is :  25.4 %    How pleased are you with your current weight loss: Pt is pleased with weight loss and feeling well      Are you experiencing any physical problems post surgery: No: - denies heartburn or reflux. Pt does have some constipation - taking three stool softeners in morning and will add Miralax. Also plans to increase water intake    Are you experiencing any dietary problems post surgery: No  Food Intolerances: Denies any food intolerances since last seen. How frequently are you eating? Eating three times a day  How long does it take you to finish a meal? Taking  time to eat- states has learned to slow down  How full do you feel after eating? No longer has hunger after eats like after surgery     Increased chicken intake, eggs, cottage cheese. Has had some fruits and steamed veggies    Protein intake: 60-80 grams/day   Patient taking protein supplement: Yes. Pt drinking one Premier Protein Shake daily = 30 grams protein    Fluid intake: ~ 48-64 oz water/day. One cup coffee in morning.   Drinking two 24 oz cups water daily- encouraged pt to add a third 24 oz cup water daily    Multivitamin/mineral intake: Womens OTC One A Day Members Tanna Morgan one tablet -18 mg iron/day- advised pt to increase this to two a day for ~ 36 mg iron daily     Calcium intake: Calcium- Members Ramiro brand 600 mg take one tablet daily- reminded pt to take this with food for best absorption       Does patient exercise: Taking drumming class twice a day    Assessment  Pt tolerating stage appropriate foods without difficulty. Requires to increase overall water intake to aide with constipation and adequate hydration. Plan  Plan/Recommendations:   Questions answered. See additional instructions below. Patient Instructions   1. Continue bariatric vitamins as you are currently taking. Increase the Women's Multivitamin to two a day instead of one a day, continue one calcium a day with food for best absorption  2. Goal continues to be 60-80 grams protein per day. Focus on choosing protein foods first at meals to remain full and maintain muscle mass while you continue to lose from body fat stores. 3. Regular physical activity is important if desire to continue weight loss efforts. Recommend regular cardiac activity and strength training 2-3 days per week. 4.  Water goal is 64 oz per day and make sure no liquids 30 minutes before meals and no liquids 30 minutes after meals  5. Take 20-30 minutes to eat meals and chew all foods well for best tolerance  get lab work done at least 5 -7 days  before next office visit.         Recommended Follow-Up: six month post op with NADEEM Cummings RD, LD,   Dietitian- Weight Management [address] [place] [address]

## 2022-01-31 ENCOUNTER — OFFICE VISIT (OUTPATIENT)
Dept: BARIATRICS/WEIGHT MGMT | Age: 47
End: 2022-01-31
Payer: COMMERCIAL

## 2022-01-31 ENCOUNTER — OFFICE VISIT (OUTPATIENT)
Dept: BARIATRICS/WEIGHT MGMT | Age: 47
End: 2022-01-31

## 2022-01-31 VITALS
BODY MASS INDEX: 44.85 KG/M2 | DIASTOLIC BLOOD PRESSURE: 84 MMHG | TEMPERATURE: 97.5 F | WEIGHT: 269.2 LBS | HEART RATE: 84 BPM | HEIGHT: 65 IN | SYSTOLIC BLOOD PRESSURE: 126 MMHG | RESPIRATION RATE: 18 BRPM

## 2022-01-31 DIAGNOSIS — Z98.84 STATUS POST LAPAROSCOPIC SLEEVE GASTRECTOMY: Primary | ICD-10-CM

## 2022-01-31 DIAGNOSIS — F32.A DEPRESSION, UNSPECIFIED DEPRESSION TYPE: ICD-10-CM

## 2022-01-31 DIAGNOSIS — Z98.84 S/P LAPAROSCOPIC SLEEVE GASTRECTOMY: Primary | ICD-10-CM

## 2022-01-31 DIAGNOSIS — K91.2 POSTSURGICAL MALABSORPTION: ICD-10-CM

## 2022-01-31 DIAGNOSIS — Z13.21 SCREENING FOR MALNUTRITION: ICD-10-CM

## 2022-01-31 PROCEDURE — 99213 OFFICE O/P EST LOW 20 MIN: CPT | Performed by: PHYSICIAN ASSISTANT

## 2022-01-31 RX ORDER — M-VIT,TX,IRON,MINS/CALC/FOLIC 27MG-0.4MG
1 TABLET ORAL DAILY
COMMUNITY

## 2022-01-31 RX ORDER — DOCUSATE SODIUM 100 MG/1
100 CAPSULE, LIQUID FILLED ORAL 2 TIMES DAILY
COMMUNITY
End: 2022-07-21

## 2022-01-31 RX ORDER — PSEUDOEPHEDRINE HCL 30 MG
500 TABLET ORAL 2 TIMES DAILY
COMMUNITY

## 2022-01-31 NOTE — PATIENT INSTRUCTIONS
1. Continue bariatric vitamins as you are currently taking. Increase the Women's Multivitamin to two a day instead of one a day, continue one calcium a day with food for best absorption  2. Goal continues to be 60-80 grams protein per day. Focus on choosing protein foods first at meals to remain full and maintain muscle mass while you continue to lose from body fat stores. 3. Regular physical activity is important if desire to continue weight loss efforts. Recommend regular cardiac activity and strength training 2-3 days per week. 4.  Water goal is 64 oz per day and make sure no liquids 30 minutes before meals and no liquids 30 minutes after meals  5. Take 20-30 minutes to eat meals and chew all foods well for best tolerance  get lab work done at least 5 -7 days  before next office visit.

## 2022-01-31 NOTE — PROGRESS NOTES
[facility] [employer]  [address], [address]  [facility], [place]      Visit Date:  1/31/2022  Weight Management Postop Follow-up    HPI:      Johana Leonardo is a 42 y.o. female who is here today for 3 month follow up since  robotic-assisted sleeve gastrectomy performed by Dr. Arlette Dye  on 10/11/21. Reports that she is doing well. Feeling good. Weight today 269#. Down 17# since last visit. Down 37# since surgery. BMI 44 . Drinking at least 64 oz of fluid and 75 grams of protein daily. Drinking 1 premier protein shakes daily. No carbonation. No sweets. Tolerating post-op diet. C/o intermittent constipation. Taking stool softener daily. Advised to add Miralax, increase fluids and discuss fiber rich foods with the dietitian today. No nausea. No emesis. No GERD/ Reflux. Off PPI. Denies CP/SOB. No Dizziness. No abdominal pain. No incisional discomfort. No sx of dehydration. No fever or chills. Taking and tolerating all vitamins- OTC Womens MV/ Calcium 2 daily. 3 month labs reviewed. GFR 68-will push fluids and monitor. Has cut back on coffee- switching to decaf. A1C 5.7. Depression stable with medication. Off BP/ DM and cholesterol medication. Rash resolved. No longer using Nystatin powder. Seca scale completed and reviewed. Physical Activity:  Cardio drumming twice per week. Current BMI: Body mass index is 44.45 kg/m².   Current Weight:   Wt Readings from Last 3 Encounters:   01/31/22 269 lb 3.2 oz (122.1 kg)   01/26/22 271 lb 9.6 oz (123.2 kg)   12/14/21 273 lb 9.6 oz (124.1 kg)     Pre-op Body Weight:306  EBW: 157  % of EBW lost: 24%      Past Medical History:  Past Medical History:   Diagnosis Date    Back pain     Body mass index 50.0-59.9, adult ([facility] [address] 06/23/2021    Depression     Diabetes mellitus (HCC)     GERD (gastroesophageal reflux disease)     Hypercholesteremia     Hypertension     UTI (urinary tract infection)        Past Surgical History:  Past Surgical History:   Procedure Laterality Date    CHOLECYSTECTOMY      COLONOSCOPY      ENDOMETRIAL ABLATION      HYSTERECTOMY ABDOMINAL N/A 12/12/2017    ROBOTIC TOTAL HYSTERECTOMY WITH BILATERAL SALPINGECTOMY performed by Neri Avila MD at [facility]      vocal cord mass removed    OVARIAN CYST REMOVAL      SLEEVE GASTRECTOMY N/A 10/11/2021    GASTRECTOMY SLEEVE LAPAROSCOPIC ROBOTIC performed by Marylu Tapia MD at [facility] N/A 2/9/2021    EGD BIOPSY performed by Katty Clifton MD at [facility]       Past Social History:  Social History     Socioeconomic History    Marital status:      Spouse name: Not on file    Number of children: Not on file    Years of education: Not on file    Highest education level: Not on file   Occupational History    Not on file   Tobacco Use    Smoking status: Never Smoker    Smokeless tobacco: Never Used   Vaping Use    Vaping Use: Never used   Substance and Sexual Activity    Alcohol use: No    Drug use: No    Sexual activity: Yes     Partners: Male   Other Topics Concern    Not on file   Social History Narrative    Not on file     Social Determinants of Health     Financial Resource Strain: Low Risk     Difficulty of Paying Living Expenses: Not hard at all   Food Insecurity: No Food Insecurity    Worried About [facility] in the Last Year: Never true    [facility] in the Last Year: Never true   Transportation Needs:     Lack of Transportation (Medical): Not on file    Lack of Transportation (Non-Medical):  Not on file   Physical Activity:     Days of Exercise per Week: Not on file    Minutes of Exercise per Session: Not on file   Stress:     Feeling of Stress : Not on file   Social Connections:     Frequency of Communication with Friends and Family: Not on file    Frequency of Social Gatherings with Friends and Family: Not on file    Attends Hinduism Services: Not on file    Active Member of Clubs or Organizations: Not on file    Attends Club or Organization Meetings: Not on file    Marital Status: Not on file   Intimate Partner Violence:     Fear of Current or Ex-Partner: Not on file    Emotionally Abused: Not on file    Physically Abused: Not on file    Sexually Abused: Not on file   Housing Stability:     Unable to Pay for Housing in the Last Year: Not on file    Number of [place] in the Last Year: Not on file    Unstable Housing in the Last Year: Not on file        Medications:   Current Outpatient Medications   Medication Sig Dispense Refill    docusate sodium (COLACE) 100 MG capsule Take 100 mg by mouth 2 times daily      Multiple Vitamins-Minerals (THERAPEUTIC MULTIVITAMIN-MINERALS) tablet Take 1 tablet by mouth daily Womans 1 a day OTC      calcium citrate (CALCITRATE) 250 MG TABS tablet Take 500 mg by mouth daily      FLUoxetine (PROZAC) 20 MG capsule Take 3 capsules by mouth daily 360 capsule 3     No current facility-administered medications for this visit. Allergies:   No Known Allergies    Subjective:    Review of Systems:  Constitutional: Denies any fever, chills, fatigue. Wound: Denies any rash, skin color changes or wound problems. Resp: Denies any cough, shortness of breath. CV: Denies any chest pain, orthopnea or syncope. MS: Denies myalgias, arthralgias. GI: Denies any nausea, vomiting, diarrhea, (+)constipation, melena, hematochezia. No incisional discomfort. : Denies any hematuria, hesitancy or dysuria. NEURO: Denies seizures, headache. Objective:    /84 (Site: Right Upper Arm, Position: Sitting, Cuff Size: Large Adult)   Pulse 84   Temp 97.5 °F (36.4 °C) (Infrared)   Resp 18   Ht 5' 5.25\" (1.657 m)   Wt 269 lb 3.2 oz (122.1 kg)   BMI 44.45 kg/m²     Physical Examination:   Constitutional: Alert and oriented to person, place and time. Well-developed, well- nourished.   Head: Normocephalic and atraumatic  Neck: Supple. Eyes: EOMI b/l. Conjunctivae normal.  No scleral icterus. Respiratory: Effort normal. No respiratory distress. Abd: Well healed incisions. Non-tender. No sign of infection. Ext:  Movement x 4. No edema  Skin; Warm and dry, no visible rashes, lesions or ulcers.    Neuro: Cranial Nerves Grossly Intact; nml coordination        Labs:  CBC   Lab Results   Component Value Date    WBC 9.9 01/24/2022    RBC 4.72 01/24/2022    HGB 14.7 01/24/2022    HCT 46.6 01/24/2022    MCV 98.7 01/24/2022    MCH 31.1 01/24/2022    MCHC 31.5 01/24/2022    RDW 13.1 12/30/2017     01/24/2022    MPV 12.0 01/24/2022    RBCMORP NORMAL 08/11/2017    SEGSPCT 62.1 01/24/2022    LABLYMP 31.3 01/24/2022    MONOPCT 5.3 01/24/2022    LABEOS 0.7 01/24/2022    BASO 0.3 01/24/2022    NRBC 0 01/24/2022    SEGSABS 6.1 01/24/2022    LYMPHSABS 3.1 01/24/2022    MONOSABS 0.5 01/24/2022    EOSABS 0.1 01/24/2022    BASOSABS 0.0 01/24/2022        BMP/CMP   Lab Results   Component Value Date    GLUCOSE 137 01/24/2022    CREATININE 0.9 01/24/2022    BUN 17 01/24/2022     01/24/2022    K 4.2 01/24/2022    K 4.1 10/12/2021     01/24/2022    CO2 23 01/24/2022    CALCIUM 8.9 01/24/2022    AST 34 01/24/2022    ALKPHOS 101 01/24/2022    PROT 6.9 01/24/2022    LABALBU 4.3 01/24/2022    BILITOT 0.3 01/24/2022    ALT 43 01/24/2022        PREALBUMIN   Lab Results   Component Value Date    PREALBUMIN 23.6 01/24/2022        VITAMIN B12   Lab Results   Component Value Date    QMAJUELV60 415 11/14/2020        24 HOUR URINE CALCIUM   No results found for: Geofm Rajendra, CALCIUMUR     VITAMIN D   Lab Results   Component Value Date    VITD25 36 01/24/2022        VITAMIN B1/ THIAMINE   Lab Results   Component Value Date    XLJQ3PFKLGY 134 01/24/2022        RBC FOLATE   Lab Results   Component Value Date    FOLATE > 20.0 11/14/2020        LIPID SCREEN (FASTING)   Lab Results   Component Value Date    CHOL 206 08/25/2021    TRIG 284 08/25/2021    HDL 41 08/25/2021    LDLCALC 108 08/25/2021   ,     HGA1C (T2DM ONLY)   Lab Results   Component Value Date    LABA1C 5.7 01/24/2022    AVGG 111 01/24/2022        TSH   Lab Results   Component Value Date    TSH 2.570 11/14/2020        IRON   Lab Results   Component Value Date    IRON 95 11/14/2020        IONIZED CALCIUM   No results found for: MICK CHAVIS      Assessment:       Diagnosis Orders   1. S/P laparoscopic sleeve gastrectomy  CBC Auto Differential    Comprehensive Metabolic Panel    Ferritin    Hemoglobin A1C    Iron    Iron Binding Capacity    Prealbumin    PTH, Intact    TSH with Reflex    Vitamin B1    Vitamin D 25 Hydroxy   2. BMI 40.0-44.9, adult (HCC)  CBC Auto Differential    Comprehensive Metabolic Panel    Ferritin    Hemoglobin A1C    Iron    Iron Binding Capacity    Prealbumin    PTH, Intact    TSH with Reflex    Vitamin B1    Vitamin D 25 Hydroxy   3. Postsurgical malabsorption  CBC Auto Differential    Comprehensive Metabolic Panel    Ferritin    Hemoglobin A1C    Iron    Iron Binding Capacity    Prealbumin    PTH, Intact    TSH with Reflex    Vitamin B1    Vitamin D 25 Hydroxy   4. Screening for malnutrition  CBC Auto Differential    Comprehensive Metabolic Panel    Ferritin    Hemoglobin A1C    Iron    Iron Binding Capacity    Prealbumin    PTH, Intact    TSH with Reflex    Vitamin B1    Vitamin D 25 Hydroxy   5. Depression, unspecified depression type       Plan:    Stay well hydrated. Drink a minimum of 64 oz of non-carbonated, non-caffeinated fluids daily. Nutritional education occurred during visit. Tolerating diet. Continue following with dietitian and follow their recommendations as directed. Continue  60-80  grams of protein each day. Continue to track. Signs and symptoms reviewed with patient that would be concerning and need her to return to office for re-evaluation. Patient will call if any questions or concerns arrise.   Importance of physical activity discussed with patient. Continue physical activity and strength training  Continue taking Multivitamin, Calcium   Encouraged to attend support groups  3 month labs reviewed with patient today  GFR 68- push fluids. Will monitor  Add Miralax daily prn. Increase fiber rich foods. 6 month labs ordered- to be drawn one week prior to next apt. SECA scale completed and reviewed with patient today. Return in about 3 months (around 4/30/2022) for postop follow up. I spent over 20 minutes with the patient, with greater that 50% of that time spent on face counseling for nutrition and exercise.     Electronically signed by NADEEM Triplett on 1/31/2022 at 3:35 PM

## 2022-02-08 ENCOUNTER — TELEPHONE (OUTPATIENT)
Dept: PHARMACY | Facility: CLINIC | Age: 47
End: 2022-02-08

## 2022-02-08 NOTE — LETTER
[facility]   [address], [address]  Phone: toll free [phone] Option #3      [address]  [address] [address]         02/18/22     Dear Heydi Almonte,    Congratulations! You have completed the 2021 requirements for the [address] [place] [address] Be Well With Diabetes Program. You have been automatically re-enrolled into the [address] [place] [address] Be Well With Diabetes Program for 2022. One of the requirements to participate in the [address] [place] [address] Be Well With Diabetes Program is to complete a Clinical Pharmacist Telephone appointment yearly. The [facility]  Team has attempted to contact you to schedule your 2022 Diabetes Management telephone appointment but was unable to reach you. We would like to work with you and your doctor to:  - Review your medications, including over-the-counter and herbal medications  - Answer questions about your medications and how to get the most benefit from them  - Identify potential drug interactions or side effects and help fix them  - Identify preferred medications that are equally effective, but available at a lower cost to you  - Help you reach the necessary requirements to remain enrolled in the Diabetes Management Program offered by [address] [place] [address]     Please call [phone] and select option #3 to schedule this appointment to take advantage of this service. Telephone appointments are available Monday thru Friday from 7:30 AM till 5:30 PM.     This is a courtesy reminder. If you have this appointment already scheduled for your 2022 enrollment in the program, please disregard this message. If you have not scheduled this appointment yet, please contact us at the above number to schedule.      Sincerely,      [address]  Phone: [phone] Option #3

## 2022-02-11 NOTE — TELEPHONE ENCOUNTER
Left message on voicemail for patient to call us back at [phone] option 3 to schedule your 2022 Be Well With Diabetes annual pharmacist telephone appointment.

## 2022-02-18 NOTE — TELEPHONE ENCOUNTER
Second attempt made to contact patient to schedule 2022 annual pharmacist appointment to discuss medications for Diabetes Management Program.      left message asking for return call. [phone] Option #3. [employer] message sent to patient and letter mailed to patient's home address.

## 2022-03-08 ENCOUNTER — TELEPHONE (OUTPATIENT)
Dept: FAMILY MEDICINE CLINIC | Age: 47
End: 2022-03-08

## 2022-03-08 DIAGNOSIS — M54.2 CERVICALGIA: Primary | ICD-10-CM

## 2022-03-08 RX ORDER — TIZANIDINE 2 MG/1
2-4 TABLET ORAL EVERY 8 HOURS PRN
Qty: 30 TABLET | Refills: 0 | Status: SHIPPED | OUTPATIENT
Start: 2022-03-08 | End: 2022-04-13

## 2022-03-08 NOTE — TELEPHONE ENCOUNTER
Pt called office stating she has neck and upper back pain, her muscles are very stiff. Pt is asking if a muscle relaxer could be prescribed. She would normally go to her chiropractor but she cannot get in with him for a while. Pt uses [address] [place] [address] Street. Call pt back. Please advise.

## 2022-03-11 ENCOUNTER — TELEPHONE (OUTPATIENT)
Dept: PHARMACY | Facility: CLINIC | Age: 47
End: 2022-03-11

## 2022-03-11 NOTE — TELEPHONE ENCOUNTER
2022 Annual Pharmacist Visit    Called patient to schedule 2022 yearly pharmacist appointment to discuss medications for Diabetes Management Program.    No answer. Left VM.      Please call back at [phone], option #3         Para [facility], [address] [employer]   Phone: [phone], option #3

## 2022-03-14 ENCOUNTER — TELEPHONE (OUTPATIENT)
Dept: PHARMACY | Facility: CLINIC | Age: 47
End: 2022-03-14

## 2022-03-14 ENCOUNTER — CLINICAL DOCUMENTATION (OUTPATIENT)
Dept: PHARMACY | Facility: CLINIC | Age: 47
End: 2022-03-14

## 2022-03-14 NOTE — TELEPHONE ENCOUNTER
[facility] CLINICAL PHARMACY REVIEW - Be Well with Diabetes    Rosalind Culver is a 42 y.o. female enrolled in the [address] [place] [address] Diabetes Program.    Patient no longer has DM diagnosis, but will remain in program to receive Be Well credit and points    Insurance through the following employer: [employer]    Medications:  Current Outpatient Medications   Medication Sig Dispense Refill    tiZANidine (ZANAFLEX) 2 MG tablet Take 1-2 tablets by mouth every 8 hours as needed (back pain) 30 tablet 0    docusate sodium (COLACE) 100 MG capsule Take 100 mg by mouth 2 times daily      Multiple Vitamins-Minerals (THERAPEUTIC MULTIVITAMIN-MINERALS) tablet Take 1 tablet by mouth daily Womans 1 a day OTC      calcium citrate (CALCITRATE) 250 MG TABS tablet Take 500 mg by mouth daily      FLUoxetine (PROZAC) 20 MG capsule Take 3 capsules by mouth daily 360 capsule 3     No current facility-administered medications for this visit.       Current Pharmacy: [facility]    Allergies:  No Known Allergies   Vitals/Labs:  BP Readings from Last 3 Encounters:   01/31/22 126/84   01/26/22 122/74   12/14/21 121/67     Lab Results   Component Value Date    LABMICR 1.49 01/12/2019     Lab Results   Component Value Date    LABA1C 5.7 01/24/2022    LABA1C 6.8 (H) 08/25/2021    LABA1C 7.4 (H) 06/30/2021     Lab Results   Component Value Date    CHOL 206 (H) 08/25/2021    TRIG 284 (H) 08/25/2021    HDL 41 08/25/2021    LDLCALC 108 08/25/2021     ALT   Date Value Ref Range Status   01/24/2022 43 11 - 66 U/L Final     Comment:     Performed at [facility], [address]     AST   Date Value Ref Range Status   01/24/2022 34 5 - 40 U/L Final     The 10-year ASCVD risk score (Adiel Hanson, et al., 2013) is: 2.4%    Values used to calculate the score:      Age: 42 years      Sex: Female      Is Non- : No      Diabetic: Yes Tobacco smoker: No      Systolic Blood Pressure: 544 mmHg      Is BP treated: No      HDL Cholesterol: 41 mg/dL      Total Cholesterol: 206 mg/dL     Lab Results   Component Value Date    CREATININE 0.9 01/24/2022     Estimated Creatinine Clearance: 105 mL/min (based on SCr of 0.9 mg/dL). Lab Results   Component Value Date    LABGLOM 68 01/24/2022    LABGLOM 78 11/18/2021    LABGLOM 78 10/12/2021    LABGLOM >90 10/09/2021       Immunizations:  Immunization History   Administered Date(s) Administered    Hepatitis A Adult (Havrix, Vaqta) 06/20/2019    Influenza Virus Vaccine 10/15/2020    Pneumococcal Polysaccharide ([id number]) 08/01/2017    Tdap (Boostrix, Adacel) 03/07/2010      Social History:  Social History     Tobacco Use    Smoking status: Never Smoker    Smokeless tobacco: Never Used   Substance Use Topics    Alcohol use: No     ASSESSMENT:  Initial Program Requirements (Y indicates has completed for the year, N indicates needs to be completed by 07/01/2022): Yes - Provider Visit for DM (1st)- OV on 1/26- dropped code  Yes - A1c (1st)     Ongoing Program Requirements (Y indicates has completed for the year, N indicates needs to be completed by 12/31/2022): No - Provider Visit for DM (2nd)  Yes - ACC/diabetes educator visit (if A1c over 8%)  No - A1c (2nd)  No - Lipid panel- can override if needed  No - Urine microalbumin- can override if needed  Yes - Pneumococcal vaccination: Up-to-date with Pneumo series  Yes - Influenza vaccination for Fall 2022- employee  Yes - Medication adherence over 70%  No - On statin or contraindication(s)- DM and HLD have resolved since gastric sleeve. Will place provider override  No - On ACEi/ARB or contraindication(s) Normal blood pressure, urinary albumin-to-creatinine ratio, and eGFR      Diabetes Care:   - Glycemic Goal: <7.0%. Stable and at blood glucose goal.   - Patient had gastric sleeve last fall. A1c returned to normal, off all medications.   DM Dx removed from her file. Patient will be staying in program going forward, but will likely need overrides placed. (see above)    PLAN:  - Consideration(s) for provider:   · None  - DM program gaps identified:   · Initial requirements: Requirements met   · Ongoing requirements: Provider visit for DM (2nd), A1c (2nd), Lipid panel, Urine microalbumin and Influenza vaccination for [id number]   - Education to patient: Discussed general issues about diabetes pathophysiology and management., Overview of Be Well With Diabetes program and Overview of HHP   - Follow up: PCP for identified gaps or as scheduled below  - Upcoming appointments:   Future Appointments   Date Time Provider Destiny Moon   4/11/2022  2:30 PM NADEEM Land [facility] [place]   8/16/2022  2:30 PM Jadiel Domínguez, [facility].  Elizabeth Sanchez, PharmD, [address] [facility], toll free: [phone]      For Pharmacy Admin Tracking Only   Gap Closed?: Yes    Time Spent (min): 15

## 2022-03-14 NOTE — PROGRESS NOTES
Pharmacy Pop Care Documentation:     AVS received for required office visit on: 1/26/ with Sean Bright- see encounter in [facility]    Patient no longer has DM diagnosis but remaining in program for Be well credit.   Code dropped for reporting

## 2022-03-18 NOTE — TELEPHONE ENCOUNTER
For [place] in place:  No   Recommendation Provided To: Patient/Caregiver: 1 via Telephone and Bryanna Chung 20 Intervention Detail: Scheduled Appointment   Gap Closed?: Yes    Intervention Accepted By: Patient/Caregiver: 1   Time Spent (min): 10

## 2022-03-18 NOTE — TELEPHONE ENCOUNTER
Second attempt made to contact patient to schedule 2022 yearly pharmacist appointment to discuss medications for Diabetes Management Program.    No answer. Left VM. Please call back at [phone], option #3    [employer] message sent to patient.         Heather Beltran, [address]   Phone: [phone], option #3

## 2022-04-09 ENCOUNTER — HOSPITAL ENCOUNTER (OUTPATIENT)
Age: 47
Discharge: HOME | End: 2022-04-09
Payer: COMMERCIAL

## 2022-04-09 DIAGNOSIS — K91.2 POSTSURGICAL MALABSORPTION: ICD-10-CM

## 2022-04-09 DIAGNOSIS — Z13.21 SCREENING FOR MALNUTRITION: ICD-10-CM

## 2022-04-09 DIAGNOSIS — Z98.84 S/P LAPAROSCOPIC SLEEVE GASTRECTOMY: ICD-10-CM

## 2022-04-09 LAB
ALBUMIN SERPL-MCNC: 4.1 G/DL (ref 3.5–5.1)
ALP BLD-CCNC: 95 U/L (ref 38–126)
ALT SERPL-CCNC: 47 U/L (ref 11–66)
ANION GAP SERPL CALCULATED.3IONS-SCNC: 11 MEQ/L (ref 8–16)
AST SERPL-CCNC: 30 U/L (ref 5–40)
AVERAGE GLUCOSE: 108 MG/DL (ref 70–126)
BASOPHILS # BLD: 0.2 %
BASOPHILS ABSOLUTE: 0 THOU/MM3 (ref 0–0.1)
BILIRUB SERPL-MCNC: 0.7 MG/DL (ref 0.3–1.2)
BUN BLDV-MCNC: 11 MG/DL (ref 7–22)
CALCIUM SERPL-MCNC: 9.2 MG/DL (ref 8.5–10.5)
CHLORIDE BLD-SCNC: 102 MEQ/L (ref 98–111)
CO2: 23 MEQ/L (ref 23–33)
CREAT SERPL-MCNC: 0.7 MG/DL (ref 0.4–1.2)
EOSINOPHIL # BLD: 0.9 %
EOSINOPHILS ABSOLUTE: 0.1 THOU/MM3 (ref 0–0.4)
ERYTHROCYTE [DISTWIDTH] IN BLOOD BY AUTOMATED COUNT: 12.2 % (ref 11.5–14.5)
ERYTHROCYTE [DISTWIDTH] IN BLOOD BY AUTOMATED COUNT: 40.4 FL (ref 35–45)
FERRITIN: 177 NG/ML (ref 10–291)
GFR SERPL CREATININE-BSD FRML MDRD: > 90 ML/MIN/1.73M2
GLUCOSE BLD-MCNC: 112 MG/DL (ref 70–108)
HBA1C MFR BLD: 5.6 % (ref 4.4–6.4)
HCT VFR BLD CALC: 43.2 % (ref 37–47)
HEMOGLOBIN: 14.5 GM/DL (ref 12–16)
IMMATURE GRANS (ABS): 0.03 THOU/MM3 (ref 0–0.07)
IMMATURE GRANULOCYTES: 0.4 %
IRON: 109 UG/DL (ref 50–170)
LYMPHOCYTES # BLD: 32.7 %
LYMPHOCYTES ABSOLUTE: 2.8 THOU/MM3 (ref 1–4.8)
MCH RBC QN AUTO: 30.5 PG (ref 26–33)
MCHC RBC AUTO-ENTMCNC: 33.6 GM/DL (ref 32.2–35.5)
MCV RBC AUTO: 90.8 FL (ref 81–99)
MONOCYTES # BLD: 6 %
MONOCYTES ABSOLUTE: 0.5 THOU/MM3 (ref 0.4–1.3)
NUCLEATED RED BLOOD CELLS: 0 /100 WBC
PLATELET # BLD: 304 THOU/MM3 (ref 130–400)
PMV BLD AUTO: 11.2 FL (ref 9.4–12.4)
POTASSIUM SERPL-SCNC: 3.7 MEQ/L (ref 3.5–5.2)
PREALBUMIN: 20.6 MG/DL (ref 20–40)
PTH INTACT: 42.2 PG/ML (ref 15–65)
RBC # BLD: 4.76 MILL/MM3 (ref 4.2–5.4)
SEG NEUTROPHILS: 59.8 %
SEGMENTED NEUTROPHILS ABSOLUTE COUNT: 5.1 THOU/MM3 (ref 1.8–7.7)
SODIUM BLD-SCNC: 136 MEQ/L (ref 135–145)
TOTAL IRON BINDING CAPACITY: 256 UG/DL (ref 171–450)
TOTAL PROTEIN: 6.8 G/DL (ref 6.1–8)
TSH SERPL DL<=0.05 MIU/L-ACNC: 2.24 UIU/ML (ref 0.4–4.2)
VITAMIN D 25-HYDROXY: 47 NG/ML (ref 30–100)
WBC # BLD: 8.5 THOU/MM3 (ref 4.8–10.8)

## 2022-04-09 PROCEDURE — 82728 ASSAY OF FERRITIN: CPT

## 2022-04-09 PROCEDURE — 83036 HEMOGLOBIN GLYCOSYLATED A1C: CPT

## 2022-04-09 PROCEDURE — 84425 ASSAY OF VITAMIN B-1: CPT

## 2022-04-09 PROCEDURE — 85025 COMPLETE CBC W/AUTO DIFF WBC: CPT

## 2022-04-09 PROCEDURE — 83540 ASSAY OF IRON: CPT

## 2022-04-09 PROCEDURE — 84443 ASSAY THYROID STIM HORMONE: CPT

## 2022-04-09 PROCEDURE — 82306 VITAMIN D 25 HYDROXY: CPT

## 2022-04-09 PROCEDURE — 80053 COMPREHEN METABOLIC PANEL: CPT

## 2022-04-09 PROCEDURE — 36415 COLL VENOUS BLD VENIPUNCTURE: CPT

## 2022-04-09 PROCEDURE — 83970 ASSAY OF PARATHORMONE: CPT

## 2022-04-09 PROCEDURE — 84134 ASSAY OF PREALBUMIN: CPT

## 2022-04-09 PROCEDURE — 83550 IRON BINDING TEST: CPT

## 2022-04-12 LAB
CHOLESTEROL, TOTAL: 222 MG/DL (ref 0–199)
FASTING: YES
GLUCOSE BLD-MCNC: 128 MG/DL (ref 74–109)
HBA1C MFR BLD: 5.6 % (ref 4.4–6.4)
HDLC SERPL-MCNC: 49 MG/DL (ref 40–90)
LDL CHOLESTEROL CALCULATED: 143 MG/DL
TRIGL SERPL-MCNC: 151 MG/DL (ref 0–199)

## 2022-04-13 ENCOUNTER — OFFICE VISIT (OUTPATIENT)
Dept: BARIATRICS/WEIGHT MGMT | Age: 47
End: 2022-04-13
Payer: COMMERCIAL

## 2022-04-13 VITALS
BODY MASS INDEX: 42.82 KG/M2 | DIASTOLIC BLOOD PRESSURE: 80 MMHG | SYSTOLIC BLOOD PRESSURE: 114 MMHG | HEART RATE: 60 BPM | WEIGHT: 257 LBS | TEMPERATURE: 96.3 F | HEIGHT: 65 IN

## 2022-04-13 DIAGNOSIS — F32.A DEPRESSION, UNSPECIFIED DEPRESSION TYPE: ICD-10-CM

## 2022-04-13 DIAGNOSIS — Z98.84 S/P LAPAROSCOPIC SLEEVE GASTRECTOMY: Primary | ICD-10-CM

## 2022-04-13 PROCEDURE — 99213 OFFICE O/P EST LOW 20 MIN: CPT | Performed by: PHYSICIAN ASSISTANT

## 2022-04-13 NOTE — PATIENT INSTRUCTIONS
Stay well hydrated. Drink a minimum of 64 oz of non-carbonated, non-caffeinated fluids daily. Nutritional education occurred during visit. Tolerating diet. Continue following with dietitian and follow their recommendations as directed. Continue  60-80 grams of protein each day. Signs and symptoms reviewed with patient that would be concerning and need her to return to office for re-evaluation. Patient will call if any questions or concerns arrise. Importance of physical activity discussed with patient. Continue dedicated physical activity and strength training. Continue taking Multivitamin and Calcium   Encouraged to attend support groups  6 month labs reviewed with patient today- B1 pending  SECA scale completed and reviewed with patient today  Measurements completed and reviewed with patient today  Add Miralax or Benefiber daily prn. Increase fiber rich foods. 6 month labs ordered- to be drawn one week prior to next apt. SECA scale completed and reviewed with patient today.

## 2022-04-13 NOTE — PROGRESS NOTES
[facility]  [address]  [place]      Visit Date:  4/13/2022  Weight Management Postop Follow-up    HPI:      Milly Hunt is a 42 y.o. female who is here today for 6 month follow up since  robotic-assisted sleeve gastrectomy performed by Dr. Jennifer Singer  on 10/11/21. Doing well. Feeling good. Staying on track with nutrition. Weight today 257#. Down 12# since last visit. Down 49# since surgery. BMI 44 . Drinking plenty of water. Getting in plenty of protein. Drinking 1 protein shakes daily. No carbonation. Rarely eating sweets. Tolerating post-op diet. C/o intermittent constipation. Taking stool softener daily. Okay to add Miralax. Also encouraged to add fruits/vegetables. No nausea. No emesis. No GERD/ Reflux. Denies CP/SOB. No Dizziness. No abdominal pain. Taking and tolerating all vitamins- OTC Womens MV/ Calcium 2 daily. Labs reviewed. A1C 5.6- continues to be off all DM medications. GFR back to normal >90. B1 pending. Depression stable with medication. Off BP medication. Off cholesterol medication. Seca scale completed and reviewed. Physical Activity:  Cardio drumming 2-3 times per week/ Fitness center- starting with [employer]. Current BMI: Body mass index is 42.44 kg/m².   Current Weight:   Wt Readings from Last 3 Encounters:   04/13/22 257 lb (116.6 kg)   01/31/22 269 lb 3.2 oz (122.1 kg)   01/26/22 271 lb 9.6 oz (123.2 kg)     Pre-op Body Weight:306  EBW: 157  % of EBW lost: 31%      Past Medical History:  Past Medical History:   Diagnosis Date    Back pain     Body mass index 50.0-59.9, adult ([facility] [address] 06/23/2021    Depression     Diabetes mellitus ([facility] [address]     GERD (gastroesophageal reflux disease)     Hypercholesteremia     Hypertension     UTI (urinary tract infection)        Past Surgical History:  Past Surgical History:   Procedure Laterality Date    CHOLECYSTECTOMY      COLONOSCOPY      ENDOMETRIAL ABLATION      HYSTERECTOMY ABDOMINAL N/A 12/12/2017    ROBOTIC TOTAL HYSTERECTOMY WITH BILATERAL SALPINGECTOMY performed by Cathy Schmid MD at [facility]      vocal cord mass removed    OVARIAN CYST REMOVAL      SLEEVE GASTRECTOMY N/A 10/11/2021    GASTRECTOMY SLEEVE LAPAROSCOPIC ROBOTIC performed by Juan Rob MD at [address] N/A 2/9/2021    EGD BIOPSY performed by Britney Kaplan MD at [facility]       Past Social History:  Social History     Socioeconomic History    Marital status:      Spouse name: Not on file    Number of children: Not on file    Years of education: Not on file    Highest education level: Not on file   Occupational History    Not on file   Tobacco Use    Smoking status: Never Smoker    Smokeless tobacco: Never Used   Vaping Use    Vaping Use: Never used   Substance and Sexual Activity    Alcohol use: No    Drug use: No    Sexual activity: Yes     Partners: Male   Other Topics Concern    Not on file   Social History Narrative    Not on file     Social Determinants of Health     Financial Resource Strain: Low Risk     Difficulty of Paying Living Expenses: Not hard at all   Food Insecurity: No Food Insecurity    Worried About [address] in the Last Year: Never true    [address] in the Last Year: Never true   Transportation Needs:     Lack of Transportation (Medical): Not on file    Lack of Transportation (Non-Medical):  Not on file   Physical Activity:     Days of Exercise per Week: Not on file    Minutes of Exercise per Session: Not on file   Stress:     Feeling of Stress : Not on file   Social Connections:     Frequency of Communication with Friends and Family: Not on file    Frequency of Social Gatherings with Friends and Family: Not on file    Attends Spiritism Services: Not on file    Active Member of Clubs or Organizations: Not on file    Attends Club or Organization Meetings: Not on file    Marital Status: Not on file   Intimate Partner Violence:     Fear of Current or Ex-Partner: Not on file    Emotionally Abused: Not on file    Physically Abused: Not on file    Sexually Abused: Not on file   Housing Stability:     Unable to Pay for Housing in the Last Year: Not on file    Number of Kadeem in the Last Year: Not on file    Unstable Housing in the Last Year: Not on file        Medications:   Current Outpatient Medications   Medication Sig Dispense Refill    docusate sodium (COLACE) 100 MG capsule Take 100 mg by mouth 2 times daily      Multiple Vitamins-Minerals (THERAPEUTIC MULTIVITAMIN-MINERALS) tablet Take 1 tablet by mouth daily Womans 1 a day OTC      calcium citrate (CALCITRATE) 250 MG TABS tablet Take 500 mg by mouth daily      FLUoxetine (PROZAC) 20 MG capsule Take 3 capsules by mouth daily 360 capsule 3     No current facility-administered medications for this visit. Allergies:   No Known Allergies    Subjective:    Review of Systems:  Constitutional: Denies any fever, chills, fatigue. Wound: Denies any rash, skin color changes or wound problems. Resp: Denies any cough, shortness of breath. CV: Denies any chest pain, orthopnea or syncope. MS: Denies myalgias, arthralgias. GI: Denies any nausea, vomiting, diarrhea, (+)constipation, melena, hematochezia. No incisional discomfort. : Denies any hematuria, hesitancy or dysuria. NEURO: Denies seizures, headache. Objective:    /80 (Site: Left Upper Arm, Position: Sitting, Cuff Size: Large Adult)   Pulse 60   Temp 96.3 °F (35.7 °C) (Infrared)   Ht 5' 5.25\" (1.657 m)   Wt 257 lb (116.6 kg)   BMI 42.44 kg/m²     Physical Examination:   Constitutional: Alert and oriented to person, place and time. Well-developed, well- nourished. Head: Normocephalic and atraumatic  Neck: Supple. Eyes: EOMI b/l. Conjunctivae normal.  No scleral icterus.   Respiratory: Effort normal. No respiratory distress. Abd: Benign  Ext:  Movement x 4. No edema  Skin; Warm and dry, no visible rashes, lesions or ulcers.    Neuro: Cranial Nerves Grossly Intact; nml coordination      Labs:  CBC   Lab Results   Component Value Date    WBC 8.5 04/09/2022    RBC 4.76 04/09/2022    HGB 14.5 04/09/2022    HCT 43.2 04/09/2022    MCV 90.8 04/09/2022    MCH 30.5 04/09/2022    MCHC 33.6 04/09/2022    RDW 13.1 12/30/2017     04/09/2022    MPV 11.2 04/09/2022    RBCMORP NORMAL 08/11/2017    SEGSPCT 59.8 04/09/2022    LABLYMP 32.7 04/09/2022    MONOPCT 6.0 04/09/2022    LABEOS 0.9 04/09/2022    BASO 0.2 04/09/2022    NRBC 0 04/09/2022    SEGSABS 5.1 04/09/2022    LYMPHSABS 2.8 04/09/2022    MONOSABS 0.5 04/09/2022    EOSABS 0.1 04/09/2022    BASOSABS 0.0 04/09/2022        BMP/CMP   Lab Results   Component Value Date    GLUCOSE 128 04/12/2022    CREATININE 0.7 04/09/2022    BUN 11 04/09/2022     04/09/2022    K 3.7 04/09/2022    K 4.1 10/12/2021     04/09/2022    CO2 23 04/09/2022    CALCIUM 9.2 04/09/2022    AST 30 04/09/2022    ALKPHOS 95 04/09/2022    PROT 6.8 04/09/2022    LABALBU 4.1 04/09/2022    BILITOT 0.7 04/09/2022    ALT 47 04/09/2022        PREALBUMIN   Lab Results   Component Value Date    PREALBUMIN 20.6 04/09/2022        VITAMIN B12   Lab Results   Component Value Date    [id number] 415 11/14/2020        24 HOUR URINE CALCIUM   No results found for: URVOLMEAS, [place], CALCIUMUR     VITAMIN D   Lab Results   Component Value Date    VITD25 47 04/09/2022        VITAMIN B1/ THIAMINE   Lab Results   Component Value Date    [id number] 134 01/24/2022        RBC FOLATE   Lab Results   Component Value Date    FOLATE > 20.0 11/14/2020        LIPID SCREEN (FASTING)   Lab Results   Component Value Date    CHOL 222 04/12/2022    TRIG 151 04/12/2022    HDL 49 04/12/2022    LDLCALC 143 04/12/2022   ,     HGA1C (T2DM ONLY)   Lab Results   Component Value Date    LABA1C 5.6 04/12/2022    AVGG 108 04/09/2022        TSH   Lab Results   Component Value Date    TSH 2.240 04/09/2022        IRON   Lab Results   Component Value Date    IRON 109 04/09/2022        IONIZED CALCIUM   No results found for: MICK CHAVIS      Assessment:       Diagnosis Orders   1. S/P laparoscopic sleeve gastrectomy     2. BMI 40.0-44.9, adult ([facility] [address]     3. Depression, unspecified depression type       Plan:    Stay well hydrated. Drink a minimum of 64 oz of non-carbonated, non-caffeinated fluids daily. Nutritional education occurred during visit. Tolerating diet. Continue following with dietitian and follow their recommendations as directed. Continue  60-80 grams of protein each day. Signs and symptoms reviewed with patient that would be concerning and need her to return to office for re-evaluation. Patient will call if any questions or concerns arrise. Importance of physical activity discussed with patient. Continue dedicated physical activity and strength training. Continue taking Multivitamin and Calcium   Encouraged to attend support groups  6 month labs reviewed with patient today- B1 pending  SECA scale completed and reviewed with patient today  Measurements completed and reviewed with patient today  Add Miralax or Benefiber daily prn. Increase fiber rich foods. 6 month labs ordered- to be drawn one week prior to next apt. SECA scale completed and reviewed with patient today. Return in about 3 months (around 7/13/2022) for postop follow up. I spent over 20 minutes with the patient, with greater that 50% of that time spent on face counseling for nutrition and exercise.     Electronically signed by NADEEM Coleman on 4/13/2022 at 3:02 PM

## 2022-04-16 LAB — VITAMIN B1 WHOLE BLOOD: 134 NMOL/L (ref 70–180)

## 2022-04-19 ENCOUNTER — PATIENT MESSAGE (OUTPATIENT)
Dept: FAMILY MEDICINE CLINIC | Age: 47
End: 2022-04-19

## 2022-04-19 RX ORDER — FLUOXETINE HYDROCHLORIDE 20 MG/1
60 CAPSULE ORAL DAILY
Qty: 360 CAPSULE | Refills: 3 | Status: SHIPPED | OUTPATIENT
Start: 2022-04-19

## 2022-04-19 NOTE — TELEPHONE ENCOUNTER
From: Durga Arenas  To: Florencio Cheng  Sent: 4/19/2022 12:58 PM EDT  Subject: Refill question    Im needing a refill on my fluoxetine and I recently switched this med to the home delivery program. I tried to get it but its saying theres no refills left. Just wondering what to do.   Thank you so much and have a great day!!

## 2022-06-16 ENCOUNTER — TELEMEDICINE (OUTPATIENT)
Dept: FAMILY MEDICINE CLINIC | Age: 47
End: 2022-06-16
Payer: COMMERCIAL

## 2022-06-16 DIAGNOSIS — J31.0 RHINOSINUSITIS: ICD-10-CM

## 2022-06-16 DIAGNOSIS — R09.82 PND (POST-NASAL DRIP): Primary | ICD-10-CM

## 2022-06-16 DIAGNOSIS — J32.9 RHINOSINUSITIS: ICD-10-CM

## 2022-06-16 PROCEDURE — 99213 OFFICE O/P EST LOW 20 MIN: CPT | Performed by: NURSE PRACTITIONER

## 2022-06-16 RX ORDER — PREDNISONE 50 MG/1
50 TABLET ORAL DAILY
Qty: 4 TABLET | Refills: 0 | Status: SHIPPED | OUTPATIENT
Start: 2022-06-16 | End: 2022-06-20

## 2022-06-16 ASSESSMENT — ENCOUNTER SYMPTOMS
SHORTNESS OF BREATH: 0
NAUSEA: 0
RHINORRHEA: 0
TROUBLE SWALLOWING: 0
ABDOMINAL PAIN: 0
SORE THROAT: 1
COUGH: 0

## 2022-06-16 NOTE — PROGRESS NOTES
Subjective:      Patient ID: Homer Fletcher is a 41 y.o. female    HPI: Acute for ST    TELEHEALTH EVALUATION -- Audio/Visual (During BLFMW-78 public health emergency)    Patient identification was verified at the start of the visit: Yes    Services were provided through a video synchronous discussion virtually to substitute for in-person clinic visit. Patient and provider were located at their individual homes. Patient has requested an audio/video evaluation for the following concern(s):    Chief Complaint   Patient presents with    Pharyngitis       Onset of Sunday with ST, itchy throat. Minor congestion. Morning PND. No fever or body aches. 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 Just got back from [place] driving. No other sickness in family. OTC: Xyzal    Patient Active Problem List   Diagnosis    Recurrent major depressive disorder, in full remission ([facility] [address]       Review of Systems   Constitutional: Positive for fatigue. Negative for chills and fever. HENT: Positive for congestion, postnasal drip and sore throat. Negative for rhinorrhea and trouble swallowing. Respiratory: Negative for cough and shortness of breath. Cardiovascular: Negative for chest pain. Gastrointestinal: Negative for abdominal pain and nausea. Skin: Negative for rash. Neurological: Negative for dizziness, light-headedness and headaches. Psychiatric/Behavioral: Negative. Objective:   Physical Exam  Constitutional:       General: She is not in acute distress. Appearance: She is not ill-appearing. Pulmonary:      Effort: Pulmonary effort is normal. No respiratory distress. Neurological:      Mental Status: She is alert and oriented to person, place, and time. Psychiatric:         Mood and Affect: Mood normal.         Behavior: Behavior normal.         Assessment:       Diagnosis Orders   1. PND (post-nasal drip)  predniSONE (DELTASONE) 50 MG tablet   2.  Rhinosinusitis  predniSONE (DELTASONE) 50 MG tablet Plan:     [place] enviromental  Prednisone Burst  Rest and fluids  RTO PRN      Charley Mobrian, was evaluated through a synchronous (real-time) audio-video encounter. The patient (or guardian if applicable) is aware that this is a billable service, which includes applicable co-pays. This Virtual Visit was conducted with patient's (and/or legal guardian's) consent. The visit was conducted pursuant to the emergency declaration under the [address] [place] [address], [address] [place] [address] authority and the [employer] General Act. Patient identification was verified, and a caregiver was present when appropriate. The patient was located at Home: [address] [place] [address]. Provider was located at [facility] (Appt Dept): [address]  [facility] [place],  [address] Total time spent for this encounter: Not billed by time    --THADDEUS Urbano CNP on 6/16/2022 at 3:17 PM    An electronic signature was used to authenticate this note.

## 2022-07-21 ENCOUNTER — OFFICE VISIT (OUTPATIENT)
Dept: BARIATRICS/WEIGHT MGMT | Age: 47
End: 2022-07-21
Payer: COMMERCIAL

## 2022-07-21 VITALS
DIASTOLIC BLOOD PRESSURE: 88 MMHG | HEIGHT: 65 IN | HEART RATE: 56 BPM | WEIGHT: 245.8 LBS | SYSTOLIC BLOOD PRESSURE: 126 MMHG | BODY MASS INDEX: 40.95 KG/M2 | TEMPERATURE: 95.9 F

## 2022-07-21 DIAGNOSIS — K91.2 POSTSURGICAL MALABSORPTION: ICD-10-CM

## 2022-07-21 DIAGNOSIS — Z98.84 S/P LAPAROSCOPIC SLEEVE GASTRECTOMY: Primary | ICD-10-CM

## 2022-07-21 DIAGNOSIS — Z13.21 SCREENING FOR MALNUTRITION: ICD-10-CM

## 2022-07-21 PROCEDURE — 99213 OFFICE O/P EST LOW 20 MIN: CPT | Performed by: PHYSICIAN ASSISTANT

## 2022-07-21 RX ORDER — GLUCOSAMINE/D3/BOSWELLIA SERRA 1500MG-400
TABLET ORAL
COMMUNITY

## 2022-07-21 NOTE — PROGRESS NOTES
[facility]  [address]  [address] [facility]      Visit Date:  7/21/2022  Weight Management Postop Follow-up    HPI:      Amy Leyva is a 41 y.o. female who is here today for 9 month follow up since  robotic-assisted sleeve gastrectomy performed by Dr. Shalnii Gómez  on 10/11/21. Reports that she is doing well. Feeling good. Weight today 245#. Down 13# since last visit. Down 62# since surgery. BMI 40 . Drinking plenty of fluids. Getting in plenty of protein through food intake. No longer drinking protein shakes. No carbonation. No sweets. Tolerating post-op diet. No problems with bowel movements. Off stool softeners. No nausea. No emesis. No GERD/ Reflux. Denies CP/SOB. No Dizziness. No abdominal pain. Taking and tolerating all vitamins- OTC Womens MV/ Calcium/ Biotin. Depression stable with medication. Continues dedicated activity 2 times per week- cardio-drumming. No questions or concerns today. Physical Activity:  Cardio drumming 2 times per week/ Fitness center- plans to start with [employer]. Current BMI: Body mass index is 40.59 kg/m².   Current Weight:   Wt Readings from Last 3 Encounters:   07/21/22 245 lb 12.8 oz (111.5 kg)   04/13/22 257 lb (116.6 kg)   01/31/22 269 lb 3.2 oz (122.1 kg)     Pre-op Body Weight:306  EBW: 157  % of EBW lost: 40%      Past Medical History:  Past Medical History:   Diagnosis Date    Back pain     Body mass index 50.0-59.9, adult ([address] 06/23/2021    Depression     Diabetes mellitus ([facility] [address]     GERD (gastroesophageal reflux disease)     Hypercholesteremia     Hypertension     UTI (urinary tract infection)        Past Surgical History:  Past Surgical History:   Procedure Laterality Date    CHOLECYSTECTOMY      COLONOSCOPY      ENDOMETRIAL ABLATION      HYSTERECTOMY ABDOMINAL N/A 12/12/2017    ROBOTIC TOTAL HYSTERECTOMY WITH BILATERAL SALPINGECTOMY performed by Shane Pierce MD at [place] [facility] syncope. MS: Denies myalgias, arthralgias. GI: Denies any nausea, vomiting, diarrhea, constipation, melena, hematochezia. No incisional discomfort. : Denies any hematuria, hesitancy or dysuria. NEURO: Denies seizures, headache. Objective:    /88 (Site: Left Upper Arm, Position: Sitting, Cuff Size: Large Adult)   Pulse 56   Temp (!) 95.9 °F (35.5 °C) (Infrared)   Ht 5' 5.25\" (1.657 m)   Wt 245 lb 12.8 oz (111.5 kg)   BMI 40.59 kg/m²     Physical Examination:   Constitutional: Alert and oriented to person, place and time. Well-developed, well- nourished. Head: Normocephalic and atraumatic  Neck: Supple. Eyes: EOMI b/l. Conjunctivae normal.  No scleral icterus. Respiratory: Effort normal. No respiratory distress. Abd: Benign  Ext:  Movement x 4. No edema  Skin; Warm and dry, no visible rashes, lesions or ulcers.    Neuro: Cranial Nerves Grossly Intact; nml coordination        Labs:  CBC   Lab Results   Component Value Date/Time    WBC 8.5 04/09/2022 06:53 AM    RBC 4.76 04/09/2022 06:53 AM    HGB 14.5 04/09/2022 06:53 AM    HCT 43.2 04/09/2022 06:53 AM    MCV 90.8 04/09/2022 06:53 AM    MCH 30.5 04/09/2022 06:53 AM    MCHC 33.6 04/09/2022 06:53 AM    RDW 13.1 12/30/2017 01:56 PM     04/09/2022 06:53 AM    MPV 11.2 04/09/2022 06:53 AM    RBCMORP NORMAL 08/11/2017 09:19 AM    SEGSPCT 59.8 04/09/2022 06:53 AM    LABLYMP 32.7 04/09/2022 06:53 AM    MONOPCT 6.0 04/09/2022 06:53 AM    LABEOS 0.9 04/09/2022 06:53 AM    BASO 0.2 04/09/2022 06:53 AM    NRBC 0 04/09/2022 06:53 AM    SEGSABS 5.1 04/09/2022 06:53 AM    LYMPHSABS 2.8 04/09/2022 06:53 AM    MONOSABS 0.5 04/09/2022 06:53 AM    EOSABS 0.1 04/09/2022 06:53 AM    BASOSABS 0.0 04/09/2022 06:53 AM        BMP/CMP   Lab Results   Component Value Date/Time    GLUCOSE 128 04/12/2022 10:56 AM    CREATININE 0.7 04/09/2022 06:53 AM    BUN 11 04/09/2022 06:53 AM     04/09/2022 06:53 AM    K 3.7 04/09/2022 06:53 AM    K 4.1 10/12/2021 06:14 AM     04/09/2022 06:53 AM    CO2 23 04/09/2022 06:53 AM    CALCIUM 9.2 04/09/2022 06:53 AM    AST 30 04/09/2022 06:53 AM    ALKPHOS 95 04/09/2022 06:53 AM    PROT 6.8 04/09/2022 06:53 AM    LABALBU 4.1 04/09/2022 06:53 AM    BILITOT 0.7 04/09/2022 06:53 AM    ALT 47 04/09/2022 06:53 AM        PREALBUMIN   Lab Results   Component Value Date/Time    PREALBUMIN 20.6 04/09/2022 06:53 AM        VITAMIN B12   Lab Results   Component Value Date/Time    [id number] 415 11/14/2020 07:29 AM        24 HOUR URINE CALCIUM   No results found for: FAMILIA ArroyoUR     VITAMIN D   Lab Results   Component Value Date/Time    VITD25 47 04/09/2022 06:53 AM        VITAMIN B1/ THIAMINE   Lab Results   Component Value Date/Time    [id number] 134 04/09/2022 06:53 AM        RBC FOLATE   Lab Results   Component Value Date/Time    FOLATE > 20.0 11/14/2020 07:29 AM        LIPID SCREEN (FASTING)   Lab Results   Component Value Date/Time    CHOL 222 04/12/2022 10:56 AM    TRIG 151 04/12/2022 10:56 AM    HDL 49 04/12/2022 10:56 AM    LDLCALC 143 04/12/2022 10:56 AM   ,     HGA1C (T2DM ONLY)   Lab Results   Component Value Date/Time    LABA1C 5.6 04/12/2022 10:56 AM    AVGG 108 04/09/2022 06:53 AM        TSH   Lab Results   Component Value Date/Time    TSH 2.240 04/09/2022 06:53 AM        IRON   Lab Results   Component Value Date/Time    IRON 109 04/09/2022 06:53 AM        IONIZED CALCIUM   No results found for: MICK CHAVIS      Assessment:       Diagnosis Orders   1. S/P laparoscopic sleeve gastrectomy  CBC with Auto Differential    Comprehensive Metabolic Panel    Ferritin    Hemoglobin A1C    Iron    Iron Binding Capacity    Lipid Panel    Prealbumin    PTH, Intact    TSH with Reflex    Vitamin B1    Vitamin B12 & Folate    Vitamin D 25 Hydroxy      2.  BMI 40.0-44.9, adult (HCC)  CBC with Auto Differential    Comprehensive Metabolic Panel    Ferritin    Hemoglobin A1C    Iron    Iron Binding Capacity    Lipid Panel Prealbumin    PTH, Intact    TSH with Reflex    Vitamin B1    Vitamin B12 & Folate    Vitamin D 25 Hydroxy      3. Postsurgical malabsorption  CBC with Auto Differential    Comprehensive Metabolic Panel    Ferritin    Hemoglobin A1C    Iron    Iron Binding Capacity    Lipid Panel    Prealbumin    PTH, Intact    TSH with Reflex    Vitamin B1    Vitamin B12 & Folate    Vitamin D 25 Hydroxy      4. Screening for malnutrition  CBC with Auto Differential    Comprehensive Metabolic Panel    Ferritin    Hemoglobin A1C    Iron    Iron Binding Capacity    Lipid Panel    Prealbumin    PTH, Intact    TSH with Reflex    Vitamin B1    Vitamin B12 & Folate    Vitamin D 25 Hydroxy          Plan:    Stay well hydrated. Drink a minimum of 64 oz of non-carbonated, non-caffeinated fluids daily. Nutritional education occurred during visit. Tolerating diet. Continue following with dietitian and follow their recommendations as directed. Continue  60-80 grams of protein each day. Signs and symptoms reviewed with patient that would be concerning and need her to return to office for re-evaluation. Patient will call if any questions or concerns arrise. Importance of physical activity discussed with patient. Increase physical activity as tolerated  Increase strength training, as tolerated  Continue taking Multivitamin and Calcium, as directed  Encouraged to attend support groups  1 year labs ordered- to be drawn prior to next apt  SECA scale next visit  Measurements next visit  Dietitian follow up at next visit  Return in about 3 months   Return in about 3 months (around 10/21/2022) for postop follow up. I spent over 20 minutes with the patient, with greater that 50% of that time spent on face counseling for nutrition and exercise.     Electronically signed by Welford Aschoff, PA on 7/21/2022 at 2:05 PM

## 2022-07-21 NOTE — PATIENT INSTRUCTIONS
Stay well hydrated. Drink a minimum of 64 oz of non-carbonated, non-caffeinated fluids daily. Nutritional education occurred during visit. Tolerating diet. Continue following with dietitian and follow their recommendations as directed. Continue  60-80 grams of protein each day. Signs and symptoms reviewed with patient that would be concerning and need her to return to office for re-evaluation. Patient will call if any questions or concerns arrise. Importance of physical activity discussed with patient.    Increase physical activity as tolerated  Increase strength training, as tolerated  Continue taking Multivitamin and Calcium, as directed  Encouraged to attend support groups  1 year labs ordered- to be drawn prior to next apt  SECA scale next visit  Measurements next visit  Dietitian follow up at next visit  Return in about 3 months

## 2022-10-25 ENCOUNTER — NURSE ONLY (OUTPATIENT)
Dept: LAB | Age: 47
End: 2022-10-25

## 2022-10-25 DIAGNOSIS — K91.2 POSTSURGICAL MALABSORPTION: ICD-10-CM

## 2022-10-25 DIAGNOSIS — Z98.84 S/P LAPAROSCOPIC SLEEVE GASTRECTOMY: ICD-10-CM

## 2022-10-25 DIAGNOSIS — Z13.21 SCREENING FOR MALNUTRITION: ICD-10-CM

## 2022-10-25 LAB
ALBUMIN SERPL-MCNC: 4.4 G/DL (ref 3.5–5.1)
ALP BLD-CCNC: 88 U/L (ref 38–126)
ALT SERPL-CCNC: 44 U/L (ref 11–66)
ANION GAP SERPL CALCULATED.3IONS-SCNC: 11 MEQ/L (ref 8–16)
AST SERPL-CCNC: 31 U/L (ref 5–40)
AVERAGE GLUCOSE: 99 MG/DL (ref 70–126)
BASOPHILS # BLD: 0.3 %
BASOPHILS ABSOLUTE: 0 THOU/MM3 (ref 0–0.1)
BILIRUB SERPL-MCNC: 0.7 MG/DL (ref 0.3–1.2)
BUN BLDV-MCNC: 9 MG/DL (ref 7–22)
CALCIUM SERPL-MCNC: 9.5 MG/DL (ref 8.5–10.5)
CHLORIDE BLD-SCNC: 101 MEQ/L (ref 98–111)
CHOLESTEROL, TOTAL: 244 MG/DL (ref 100–199)
CO2: 28 MEQ/L (ref 23–33)
CREAT SERPL-MCNC: 0.7 MG/DL (ref 0.4–1.2)
EOSINOPHIL # BLD: 1 %
EOSINOPHILS ABSOLUTE: 0.1 THOU/MM3 (ref 0–0.4)
ERYTHROCYTE [DISTWIDTH] IN BLOOD BY AUTOMATED COUNT: 12.1 % (ref 11.5–14.5)
ERYTHROCYTE [DISTWIDTH] IN BLOOD BY AUTOMATED COUNT: 40 FL (ref 35–45)
FERRITIN: 171 NG/ML (ref 10–291)
FOLATE: 19.1 NG/ML (ref 4.8–24.2)
GFR SERPL CREATININE-BSD FRML MDRD: > 60 ML/MIN/1.73M2
GLUCOSE BLD-MCNC: 105 MG/DL (ref 70–108)
HBA1C MFR BLD: 5.3 % (ref 4.4–6.4)
HCT VFR BLD CALC: 43.6 % (ref 37–47)
HDLC SERPL-MCNC: 63 MG/DL
HEMOGLOBIN: 14.9 GM/DL (ref 12–16)
IMMATURE GRANS (ABS): 0.01 THOU/MM3 (ref 0–0.07)
IMMATURE GRANULOCYTES: 0.1 %
IRON: 152 UG/DL (ref 50–170)
LDL CHOLESTEROL CALCULATED: 141 MG/DL
LYMPHOCYTES # BLD: 33.1 %
LYMPHOCYTES ABSOLUTE: 2.3 THOU/MM3 (ref 1–4.8)
MCH RBC QN AUTO: 30.9 PG (ref 26–33)
MCHC RBC AUTO-ENTMCNC: 34.2 GM/DL (ref 32.2–35.5)
MCV RBC AUTO: 90.5 FL (ref 81–99)
MONOCYTES # BLD: 5.1 %
MONOCYTES ABSOLUTE: 0.4 THOU/MM3 (ref 0.4–1.3)
NUCLEATED RED BLOOD CELLS: 0 /100 WBC
PLATELET # BLD: 299 THOU/MM3 (ref 130–400)
PMV BLD AUTO: 11.6 FL (ref 9.4–12.4)
POTASSIUM SERPL-SCNC: 4.4 MEQ/L (ref 3.5–5.2)
PREALBUMIN: 22.9 MG/DL (ref 20–40)
PTH INTACT: 47 PG/ML (ref 15–65)
RBC # BLD: 4.82 MILL/MM3 (ref 4.2–5.4)
SEG NEUTROPHILS: 60.4 %
SEGMENTED NEUTROPHILS ABSOLUTE COUNT: 4.2 THOU/MM3 (ref 1.8–7.7)
SODIUM BLD-SCNC: 140 MEQ/L (ref 135–145)
TOTAL IRON BINDING CAPACITY: 276 UG/DL (ref 171–450)
TOTAL PROTEIN: 6.5 G/DL (ref 6.1–8)
TRIGL SERPL-MCNC: 202 MG/DL (ref 0–199)
TSH SERPL DL<=0.05 MIU/L-ACNC: 1.66 UIU/ML (ref 0.4–4.2)
VITAMIN B-12: 491 PG/ML (ref 211–911)
VITAMIN D 25-HYDROXY: 45 NG/ML (ref 30–100)
WBC # BLD: 6.9 THOU/MM3 (ref 4.8–10.8)

## 2022-10-26 ENCOUNTER — OFFICE VISIT (OUTPATIENT)
Dept: BARIATRICS/WEIGHT MGMT | Age: 47
End: 2022-10-26
Payer: COMMERCIAL

## 2022-10-26 VITALS
BODY MASS INDEX: 41.12 KG/M2 | WEIGHT: 246.8 LBS | SYSTOLIC BLOOD PRESSURE: 128 MMHG | HEART RATE: 68 BPM | TEMPERATURE: 96.8 F | HEIGHT: 65 IN | DIASTOLIC BLOOD PRESSURE: 88 MMHG

## 2022-10-26 DIAGNOSIS — F32.A DEPRESSION, UNSPECIFIED DEPRESSION TYPE: ICD-10-CM

## 2022-10-26 DIAGNOSIS — Z98.84 S/P LAPAROSCOPIC SLEEVE GASTRECTOMY: Primary | ICD-10-CM

## 2022-10-26 PROCEDURE — 99213 OFFICE O/P EST LOW 20 MIN: CPT | Performed by: PHYSICIAN ASSISTANT

## 2022-10-26 NOTE — PROGRESS NOTES
[facility]  [address], [address]  [place]      Visit Date:  10/26/2022  Weight Management Postop Follow-up    HPI:      Sushma Multani is a 41 y.o. female who is here today for 1 year follow up since  robotic-assisted sleeve gastrectomy performed by Dr. Richard Richardson  on 10/11/21. Reports that she is doing well. Feeling great. Happy with results since surgery but would like to lose more weight. Has been doing well with nutrition but admits that she has not been getting in dedicated physical activity in awhile. Has plans to get [facility] or start [employer] classes. Weight today 246#. Up 1# since last visit. Down 61# since surgery. BMI 40 . Drinking over 64 oz of fluid and 70-80 grams of protein daily. Tracking protein on guero on phone. No carbonation. Rarely eating sweets. Tolerating post-op diet. No problems with bowel movements. No nausea. No emesis. No GERD/ Reflux. Denies CP/SOB. No Dizziness. No abdominal pain. Taking and tolerating all vitamins- OTC Womens MV/ Calcium and Biotin. Seca scale completed and reviewed. Annual labs completed and reviewed. A1C 5.3. Cholesterol/triglycerides elevated-unsure if fasting. Will follow up with PCP. No questions/concerns today. Physical Activity:  none currently. Current BMI: Body mass index is 40.76 kg/m².   Current Weight:   Wt Readings from Last 3 Encounters:   10/26/22 246 lb 12.8 oz (111.9 kg)   07/21/22 245 lb 12.8 oz (111.5 kg)   04/13/22 257 lb (116.6 kg)     Pre-op Body Weight:306  EBW: 157  % of EBW lost: 40%      Past Medical History:  Past Medical History:   Diagnosis Date    Back pain     Body mass index 50.0-59.9, adult ([address] 06/23/2021    Depression     Diabetes mellitus (HCC)     GERD (gastroesophageal reflux disease)     Hypercholesteremia     Hypertension     UTI (urinary tract infection)        Past Surgical History:  Past Surgical History:   Procedure Laterality Date    CHOLECYSTECTOMY      COLONOSCOPY      ENDOMETRIAL ABLATION      HYSTERECTOMY ABDOMINAL N/A 12/12/2017    ROBOTIC TOTAL HYSTERECTOMY WITH BILATERAL SALPINGECTOMY performed by Sekou Davis MD at [address] [place] [address]      vocal cord mass removed    OVARIAN CYST REMOVAL      SLEEVE GASTRECTOMY N/A 10/11/2021    GASTRECTOMY SLEEVE LAPAROSCOPIC ROBOTIC performed by Sandra Squires MD at [place] [address] N/A 2/9/2021    EGD BIOPSY performed by Jonatan Connor MD at [facility]       Past Social History:  Social History     Socioeconomic History    Marital status:      Spouse name: Not on file    Number of children: Not on file    Years of education: Not on file    Highest education level: Not on file   Occupational History    Not on file   Tobacco Use    Smoking status: Never    Smokeless tobacco: Never   Vaping Use    Vaping Use: Never used   Substance and Sexual Activity    Alcohol use: No    Drug use: No    Sexual activity: Yes     Partners: Male   Other Topics Concern    Not on file   Social History Narrative    Not on file     Social Determinants of Health     Financial Resource Strain: Not on file   Food Insecurity: Not on file   Transportation Needs: Not on file   Physical Activity: Not on file   Stress: Not on file   Social Connections: Not on file   Intimate Partner Violence: Not on file   Housing Stability: Not on file        Medications:   Current Outpatient Medications   Medication Sig Dispense Refill    Biotin 07654 MCG TABS Take by mouth      FLUoxetine (PROZAC) 20 MG capsule Take 3 capsules by mouth daily 360 capsule 3    Multiple Vitamins-Minerals (THERAPEUTIC MULTIVITAMIN-MINERALS) tablet Take 1 tablet by mouth daily Womans 1 a day OTC      calcium citrate (CALCITRATE) 250 MG TABS tablet Take 500 mg by mouth 2 times daily       No current facility-administered medications for this visit.        Allergies:   No Known Allergies    Subjective: Review of Systems:  Constitutional: Denies any fever, chills, fatigue. Wound: Denies any rash, skin color changes or wound problems. Resp: Denies any cough, shortness of breath. CV: Denies any chest pain, orthopnea or syncope. MS: Denies myalgias, arthralgias. GI: Denies any nausea, vomiting, diarrhea, constipation, melena, hematochezia. No incisional discomfort. : Denies any hematuria, hesitancy or dysuria. NEURO: Denies seizures, headache. Objective:    /88 (Site: Right Upper Arm, Position: Sitting, Cuff Size: Large Adult)   Pulse 68   Temp 96.8 °F (36 °C) (Infrared)   Ht 5' 5.25\" (1.657 m)   Wt 246 lb 12.8 oz (111.9 kg)   BMI 40.76 kg/m²     Physical Examination:   Constitutional: Alert and oriented to person, place and time. Well-developed, well- nourished. Head: Normocephalic and atraumatic  Neck: Supple. Eyes: EOMI b/l. Conjunctivae normal.  No scleral icterus. Respiratory: Effort normal. No respiratory distress. Abd: Benign  Ext:  Movement x 4. No edema  Skin; Warm and dry, no visible rashes, lesions or ulcers.    Neuro: Cranial Nerves Grossly Intact; nml coordination          Labs:  CBC   Lab Results   Component Value Date/Time    WBC 6.9 10/25/2022 08:55 AM    RBC 4.82 10/25/2022 08:55 AM    HGB 14.9 10/25/2022 08:55 AM    HCT 43.6 10/25/2022 08:55 AM    MCV 90.5 10/25/2022 08:55 AM    MCH 30.9 10/25/2022 08:55 AM    MCHC 34.2 10/25/2022 08:55 AM    RDW 13.1 12/30/2017 01:56 PM     10/25/2022 08:55 AM    MPV 11.6 10/25/2022 08:55 AM    RBCMORP NORMAL 08/11/2017 09:19 AM    SEGSPCT 60.4 10/25/2022 08:55 AM    LABLYMP 33.1 10/25/2022 08:55 AM    MONOPCT 5.1 10/25/2022 08:55 AM    LABEOS 1.0 10/25/2022 08:55 AM    BASO 0.3 10/25/2022 08:55 AM    NRBC 0 10/25/2022 08:55 AM    SEGSABS 4.2 10/25/2022 08:55 AM    LYMPHSABS 2.3 10/25/2022 08:55 AM    MONOSABS 0.4 10/25/2022 08:55 AM    EOSABS 0.1 10/25/2022 08:55 AM    BASOSABS 0.0 10/25/2022 08:55 AM        BMP/CMP   Lab Results   Component Value Date/Time    GLUCOSE 105 10/25/2022 08:54 AM    CREATININE 0.7 10/25/2022 08:54 AM    BUN 9 10/25/2022 08:54 AM     10/25/2022 08:54 AM    K 4.4 10/25/2022 08:54 AM    K 4.1 10/12/2021 06:14 AM     10/25/2022 08:54 AM    CO2 28 10/25/2022 08:54 AM    CALCIUM 9.5 10/25/2022 08:54 AM    AST 31 10/25/2022 08:54 AM    ALKPHOS 88 10/25/2022 08:54 AM    PROT 6.5 10/25/2022 08:54 AM    LABALBU 4.4 10/25/2022 08:54 AM    BILITOT 0.7 10/25/2022 08:54 AM    ALT 44 10/25/2022 08:54 AM        PREALBUMIN   Lab Results   Component Value Date/Time    PREALBUMIN 22.9 10/25/2022 08:54 AM        VITAMIN B12   Lab Results   Component Value Date/Time    KHWCMOZB16 491 10/25/2022 08:54 AM        24 HOUR URINE CALCIUM   No results found for: Unique Elkins CALCIUMUR     VITAMIN D   Lab Results   Component Value Date/Time    VITD25 45 10/25/2022 08:54 AM        VITAMIN B1/ THIAMINE   Lab Results   Component Value Date/Time    [id number] 134 04/09/2022 06:53 AM        RBC FOLATE   Lab Results   Component Value Date/Time    FOLATE 19.1 10/25/2022 08:54 AM        LIPID SCREEN (FASTING)   Lab Results   Component Value Date/Time    CHOL 244 10/25/2022 08:54 AM    TRIG 202 10/25/2022 08:54 AM    HDL 63 10/25/2022 08:54 AM    LDLCALC 141 10/25/2022 08:54 AM   ,     HGA1C (T2DM ONLY)   Lab Results   Component Value Date/Time    LABA1C 5.3 10/25/2022 08:55 AM    AVGG 99 10/25/2022 08:55 AM        TSH   Lab Results   Component Value Date/Time    TSH 1.660 10/25/2022 08:54 AM        IRON   Lab Results   Component Value Date/Time    IRON 152 10/25/2022 08:54 AM        IONIZED CALCIUM   No results found for: MICK CHAVIS      Assessment:       Diagnosis Orders   1. S/P laparoscopic sleeve gastrectomy        2. BMI 40.0-44.9, adult ([facility]        3. Depression, unspecified depression type              Plan:    Stay well hydrated.  Drink a minimum of 64 oz of non-carbonated, non-caffeinated fluids daily.  Nutritional education occurred during visit. Tolerating diet. Continue following with dietitian and follow their recommendations as directed. Continue  60-80 grams of protein each day. Signs and symptoms reviewed with patient that would be concerning and need her to return to office for re-evaluation. Patient will call if any questions or concerns arrise. Importance of physical activity discussed with patient. Increase physical activity as tolerated  Add strength training  Continue taking Multivitamin and Calcium   Encouraged to attend support groups  Annual labs reviewed with patient today- B1 pending  Elevated cholesterol/ triglycerides-unsure if fasting. Will follow up with PCP. SECA scale completed and reviewed with patient today  Measurements completed and reviewed with patient today  Return in about 6 months (around 4/26/2023) for postop follow up. I spent over 20 minutes with the patient, with greater that 50% of that time spent on face counseling for nutrition and exercise.     Electronically signed by NADEEM Glover on 10/26/2022 at 2:27 PM

## 2022-10-31 LAB — VITAMIN B1 WHOLE BLOOD: 135 NMOL/L (ref 70–180)

## 2022-12-22 ENCOUNTER — HOSPITAL ENCOUNTER (EMERGENCY)
Age: 47
Discharge: HOME | End: 2022-12-22
Payer: COMMERCIAL

## 2022-12-22 VITALS
HEART RATE: 89 BPM | SYSTOLIC BLOOD PRESSURE: 166 MMHG | RESPIRATION RATE: 16 BRPM | OXYGEN SATURATION: 97 % | DIASTOLIC BLOOD PRESSURE: 77 MMHG | WEIGHT: 242 LBS | TEMPERATURE: 100 F | BODY MASS INDEX: 39.96 KG/M2

## 2022-12-22 DIAGNOSIS — J02.9 PHARYNGITIS, UNSPECIFIED ETIOLOGY: ICD-10-CM

## 2022-12-22 DIAGNOSIS — J06.9 VIRAL URI WITH COUGH: Primary | ICD-10-CM

## 2022-12-22 LAB
FLU A ANTIGEN: NEGATIVE
GROUP A STREP CULTURE, REFLEX: NEGATIVE
INFLUENZA B AG, EIA: NEGATIVE

## 2022-12-22 PROCEDURE — 99213 OFFICE O/P EST LOW 20 MIN: CPT

## 2022-12-22 PROCEDURE — 87880 STREP A ASSAY W/OPTIC: CPT

## 2022-12-22 PROCEDURE — 87804 INFLUENZA ASSAY W/OPTIC: CPT

## 2022-12-22 RX ORDER — DOXYCYCLINE HYCLATE 100 MG
100 TABLET ORAL 2 TIMES DAILY
Qty: 14 TABLET | Refills: 0 | Status: SHIPPED | OUTPATIENT
Start: 2022-12-22 | End: 2022-12-29

## 2022-12-22 ASSESSMENT — PAIN - FUNCTIONAL ASSESSMENT: PAIN_FUNCTIONAL_ASSESSMENT: 0-10

## 2022-12-22 ASSESSMENT — PAIN SCALES - GENERAL: PAINLEVEL_OUTOF10: 2

## 2022-12-22 ASSESSMENT — ENCOUNTER SYMPTOMS
COUGH: 1
TROUBLE SWALLOWING: 0
SORE THROAT: 1

## 2022-12-22 ASSESSMENT — PAIN DESCRIPTION - LOCATION: LOCATION: CHEST

## 2022-12-22 NOTE — Clinical Note
Nevaeh Llamas was seen and treated in our emergency department on 12/22/2022. She may return to work on 12/24/2022. If you have any questions or concerns, please don't hesitate to call.       Everett Alonzo, APRN - CNP

## 2022-12-22 NOTE — ED TRIAGE NOTES
BODØ arrives to room with complaint of  tickle in throat and ears, cough, chest hurts from coughing   symptoms started 1 days ago.        No work slip needed

## 2022-12-22 NOTE — DISCHARGE INSTRUCTIONS
Drink plenty of fluids    Salt water gargle, Chloraseptic spray or lozenges may be helpful for the sore throat    I recommend taking an at home COVID test tomorrow if symptoms have not improved    Tylenol as needed for fever control    Return as needed

## 2022-12-22 NOTE — ED PROVIDER NOTES
[facility]  Urgent Care Encounter       CHIEF COMPLAINT       Chief Complaint   Patient presents with    Cough       Nurses Notes reviewed and I agree except as noted in the HPI. HISTORY OF PRESENT ILLNESS   Willie Tang is a 41 y.o. female who presents for scratchy throat, ears are itching, nonproductive irritating cough. Symptoms began yesterday. She felt hot and cold yesterday. She does have a low-grade fever. HPI    REVIEW OF SYSTEMS     Review of Systems   Constitutional:  Positive for fever. Negative for activity change and fatigue. HENT:  Positive for congestion and sore throat. Negative for trouble swallowing. Respiratory:  Positive for cough. PAST MEDICAL HISTORY         Diagnosis Date    Back pain     Body mass index 50.0-59.9, adult ([facility] 06/23/2021    Depression     Diabetes mellitus ([facility]     GERD (gastroesophageal reflux disease)     Hypercholesteremia     Hypertension     UTI (urinary tract infection)        SURGICALHISTORY     Patient  has a past surgical history that includes Endometrial ablation; other surgical history; Cholecystectomy; HYSTERECTOMY ABDOMINAL (N/A, 12/12/2017); ovarian cyst removal; Upper gastrointestinal endoscopy (N/A, 2/9/2021); Colonoscopy; and Sleeve Gastrectomy (N/A, 10/11/2021). CURRENT MEDICATIONS       Discharge Medication List as of 12/22/2022  9:20 AM        CONTINUE these medications which have NOT CHANGED    Details   Biotin 48252 MCG TABS Take by mouthHistorical Med      FLUoxetine (PROZAC) 20 MG capsule Take 3 capsules by mouth daily, Disp-360 capsule, R-3Normal      Multiple Vitamins-Minerals (THERAPEUTIC MULTIVITAMIN-MINERALS) tablet Take 1 tablet by mouth daily Womans 1 a day OTCHistorical Med      calcium citrate (CALCITRATE) 250 MG TABS tablet Take 500 mg by mouth 2 times dailyHistorical Med             ALLERGIES     Patient is has No Known Allergies.     Patients   Immunization History   Administered Date(s) Administered    Hepatitis A Adult (Havrix, Vaqta) 06/20/2019    Influenza Virus Vaccine 10/15/2020    Pneumococcal Polysaccharide ([id number]) 08/01/2017    Tdap (Boostrix, Adacel) 03/07/2010       FAMILY HISTORY     Patient's family history includes Cancer in her maternal grandfather and maternal grandmother; Heart Disease in her father; High Blood Pressure in her father; No Known Problems in her brother and sister; Obesity in her mother. SOCIAL HISTORY     Patient  reports that she has never smoked. She has never used smokeless tobacco. She reports that she does not drink alcohol and does not use drugs. PHYSICAL EXAM     ED TRIAGE VITALS  BP: (!) 166/77, Temp: 100 °F (37.8 °C), Heart Rate: 89, Resp: 16, SpO2: 97 %,Estimated body mass index is 39.96 kg/m² as calculated from the following:    Height as of 10/26/22: 5' 5.25\" (1.657 m). Weight as of this encounter: 242 lb (109.8 kg). ,No LMP recorded. Patient has had a hysterectomy. Physical Exam  Constitutional:       Appearance: She is normal weight. HENT:      Right Ear: Tympanic membrane and ear canal normal.      Left Ear: Tympanic membrane and ear canal normal.      Nose: No congestion or rhinorrhea. Mouth/Throat:      Mouth: Mucous membranes are moist.      Pharynx: Oropharynx is clear. No oropharyngeal exudate. Cardiovascular:      Rate and Rhythm: Normal rate and regular rhythm. Pulses: Normal pulses. Heart sounds: Normal heart sounds. Pulmonary:      Effort: Pulmonary effort is normal.      Breath sounds: Normal breath sounds. Skin:     General: Skin is warm and dry. Capillary Refill: Capillary refill takes less than 2 seconds. Neurological:      General: No focal deficit present. Mental Status: She is alert.    Psychiatric:         Mood and Affect: Mood normal.       DIAGNOSTIC RESULTS     Labs:  Results for orders placed or performed during the hospital encounter of 12/22/22   Rapid influenza A/B antigens Result Value Ref Range    Flu A Antigen Negative NEGATIVE    Influenza B Ag, EIA Negative NEGATIVE   STREP A ANTIGEN   Result Value Ref Range    GROUP A STREP CULTURE, REFLEX NEGATIVE        IMAGING:    No orders to display         EKG:      URGENT CARE COURSE:     Vitals:    12/22/22 0833   BP: (!) 166/77   Pulse: 89   Resp: 16   Temp: 100 °F (37.8 °C)   TempSrc: Oral   SpO2: 97%   Weight: 242 lb (109.8 kg)       Medications - No data to display         PROCEDURES:  None    FINAL IMPRESSION      1. Viral URI with cough    2. Pharyngitis, unspecified etiology          DISPOSITION/ PLAN     Presents for viral URI with cough, viral pharyngitis. Strep and influenza testing negative. Patient is informed to test for COVID at home with at home test tomorrow if symptoms or not improving. She will perform salt water gargles, Chloraseptic spray, Tylenol as needed. Follow-up with family physician if not improved next week. Return sooner if worse. Several hours after the patient was discharged, the patient called stating that she now has severe sinus pain and pressure. The patient did have 100 degree temp while she was here. I will treat with doxycycline. She is advised to return for new or worsening symptoms.       PATIENT REFERRED TO:  THADDEUS Barillas CNP  [address] [place] [id number]      DISCHARGE MEDICATIONS:  Discharge Medication List as of 12/22/2022  9:20 AM          Discharge Medication List as of 12/22/2022  9:20 AM        STOP taking these medications       nystatin (MYCOSTATIN) 108495 UNIT/GM powder Comments:   Reason for Stopping:               Discharge Medication List as of 12/22/2022  9:20 AM          THADDEUS Salazar CNP    (Please note that portions of this note were completed with a voice recognition program. Efforts were made to edit the dictations but occasionally words are mis-transcribed.)           THADDEUS Salazar CNP  12/22/22 [address] [place] [address] THADDEUS - CNP  12/22/22 1829

## 2023-01-12 ENCOUNTER — TELEPHONE (OUTPATIENT)
Dept: PHARMACY | Facility: CLINIC | Age: 48
End: 2023-01-12

## 2023-01-12 NOTE — TELEPHONE ENCOUNTER
2023 Annual Pharmacist Visit  **Patient is [facility]**    Called patient to schedule 2023 yearly pharmacist appointment to discuss medications for Diabetes Management Program.      No answer. Left VM on Home TAD: Please call back at [phone] Option #3.      Edilberto Coyle, [address] [employer]   Department, toll free: [phone] Option #3

## 2023-01-18 NOTE — TELEPHONE ENCOUNTER
No answer after 2 attempts. Sending [employer] for outreach.      For Pharmacy Admin Tracking Only    Program: [address] place:  No  Gap Closed?: No   Time Spent (min): 5

## 2023-02-06 ENCOUNTER — TELEPHONE (OUTPATIENT)
Dept: PHARMACY | Facility: CLINIC | Age: 48
End: 2023-02-06

## 2023-02-06 NOTE — TELEPHONE ENCOUNTER
2023 Annual Pharmacist Visit  **Patient is [facility]**    Called patient to schedule 2023 yearly pharmacist appointment to discuss medications for Diabetes Management Program.    No answer. Mailbox is full. Sending letter for outreach.       Zuleika Bran, [address] [employer]   Department, toll free: [phone] Option #3     For Pharmacy Admin Tracking Only    Program: [address] in place:  No  Gap Closed?: No   Time Spent (min): 5

## 2023-02-06 NOTE — LETTER
[place]   [address], [address]  Phone: toll free [phone] Option #3        [address]  [address] [address]           02/06/23     Dear Kathi Ariza,    Congratulations! You have completed the 2022 requirements for the [address] [place] [address] Be Well With Diabetes Program. You have been automatically re-enrolled into the [address] [place] [address] Be Well With Diabetes Program for 2023. One of the requirements to participate in the [address] [place] [address] Be Well With Diabetes Program is to complete a Clinical Pharmacist Telephone appointment yearly. [facility]  Team has attempted to contact you to schedule your 2023 Diabetes Management telephone appointment but was unable to reach you. We would like to work with you and your doctor to:  - Review your medications, including over-the-counter and herbal medications  - Answer questions about your medications and how to get the most benefit from them  - Identify potential drug interactions or side effects and help fix them  - Identify preferred medications that are equally effective, but available at a lower cost to you  - Help you reach the necessary requirements to remain enrolled in the Diabetes Management Program offered by [address] [place] [address]     Please call [phone] and select option #3 to schedule this appointment to take advantage of this service. Telephone appointments are available Monday thru Friday from 7:30 AM till 5:30 PM.     This is a courtesy reminder. If you have this appointment already scheduled for your 2023 enrollment in the program, please disregard this message. If you have not scheduled this appointment yet, please contact us at the above number to schedule.      Sincerely,      [address] [facility]  Phone: [phone] Option #3

## 2023-03-06 ENCOUNTER — TELEPHONE (OUTPATIENT)
Dept: PHARMACY | Facility: CLINIC | Age: 48
End: 2023-03-06

## 2023-03-06 NOTE — TELEPHONE ENCOUNTER
**Patient is [facility]**  Called patient to schedule 2023 yearly pharmacist appointment to discuss medications for Diabetes Management Program.    No answer. Left VM on mobile TAD: Please call back at [phone] Option #3.      Sade Gonzales, [address] [facility]   Department, toll free: [phone] Option #3

## 2023-03-14 NOTE — TELEPHONE ENCOUNTER
Second attempt made to contact patient to schedule 2022 yearly pharmacist appointment to discuss medications for Diabetes Management Program.    No answer. Left VM on mobile TAD: Please call back at [phone] Option #3. [employer] message sent to patient. No further patient outreach planned at this time.     [address], [address] [employer]   Department, toll free: [phone] Option #3      For Pharmacy Admin Tracking Only    Program: [address] in place:  No  Gap Closed?: No   Time Spent (min): 5

## 2023-04-03 ENCOUNTER — OFFICE VISIT (OUTPATIENT)
Dept: FAMILY MEDICINE CLINIC | Age: 48
End: 2023-04-03
Payer: COMMERCIAL

## 2023-04-03 VITALS
RESPIRATION RATE: 16 BRPM | BODY MASS INDEX: 40.36 KG/M2 | HEART RATE: 68 BPM | SYSTOLIC BLOOD PRESSURE: 118 MMHG | DIASTOLIC BLOOD PRESSURE: 82 MMHG | WEIGHT: 244.4 LBS

## 2023-04-03 DIAGNOSIS — F33.42 RECURRENT MAJOR DEPRESSIVE DISORDER, IN FULL REMISSION (HCC): ICD-10-CM

## 2023-04-03 DIAGNOSIS — B37.2 INTERTRIGINOUS CANDIDIASIS: ICD-10-CM

## 2023-04-03 DIAGNOSIS — Z90.3 S/P GASTRIC SLEEVE PROCEDURE: ICD-10-CM

## 2023-04-03 DIAGNOSIS — E66.01 OBESITY, MORBID, BMI 40.0-49.9 (HCC): Primary | ICD-10-CM

## 2023-04-03 PROCEDURE — 99213 OFFICE O/P EST LOW 20 MIN: CPT | Performed by: NURSE PRACTITIONER

## 2023-04-03 RX ORDER — CLOTRIMAZOLE 1 %
CREAM (GRAM) TOPICAL
Qty: 60 G | Refills: 1 | Status: SHIPPED | OUTPATIENT
Start: 2023-04-03 | End: 2023-04-10

## 2023-04-03 RX ORDER — PHENTERMINE HYDROCHLORIDE 37.5 MG/1
37.5 CAPSULE ORAL EVERY MORNING
Qty: 30 CAPSULE | Refills: 0 | Status: SHIPPED | OUTPATIENT
Start: 2023-04-03 | End: 2023-05-03

## 2023-04-03 RX ORDER — CHOLECALCIFEROL (VITAMIN D3) 1250 MCG
1 CAPSULE ORAL DAILY
COMMUNITY

## 2023-04-03 SDOH — ECONOMIC STABILITY: HOUSING INSECURITY
IN THE LAST 12 MONTHS, WAS THERE A TIME WHEN YOU DID NOT HAVE A STEADY PLACE TO SLEEP OR SLEPT IN A SHELTER (INCLUDING NOW)?: NO

## 2023-04-03 SDOH — ECONOMIC STABILITY: FOOD INSECURITY: WITHIN THE PAST 12 MONTHS, THE FOOD YOU BOUGHT JUST DIDN'T LAST AND YOU DIDN'T HAVE MONEY TO GET MORE.: NEVER TRUE

## 2023-04-03 SDOH — ECONOMIC STABILITY: FOOD INSECURITY: WITHIN THE PAST 12 MONTHS, YOU WORRIED THAT YOUR FOOD WOULD RUN OUT BEFORE YOU GOT MONEY TO BUY MORE.: NEVER TRUE

## 2023-04-03 SDOH — ECONOMIC STABILITY: INCOME INSECURITY: HOW HARD IS IT FOR YOU TO PAY FOR THE VERY BASICS LIKE FOOD, HOUSING, MEDICAL CARE, AND HEATING?: NOT HARD AT ALL

## 2023-04-03 ASSESSMENT — ENCOUNTER SYMPTOMS
COUGH: 0
ABDOMINAL PAIN: 0
SHORTNESS OF BREATH: 0
NAUSEA: 0

## 2023-04-03 ASSESSMENT — PATIENT HEALTH QUESTIONNAIRE - PHQ9
SUM OF ALL RESPONSES TO PHQ QUESTIONS 1-9: 0
10. IF YOU CHECKED OFF ANY PROBLEMS, HOW DIFFICULT HAVE THESE PROBLEMS MADE IT FOR YOU TO DO YOUR WORK, TAKE CARE OF THINGS AT HOME, OR GET ALONG WITH OTHER PEOPLE: 0
6. FEELING BAD ABOUT YOURSELF - OR THAT YOU ARE A FAILURE OR HAVE LET YOURSELF OR YOUR FAMILY DOWN: 0
3. TROUBLE FALLING OR STAYING ASLEEP: 0
SUM OF ALL RESPONSES TO PHQ QUESTIONS 1-9: 0
4. FEELING TIRED OR HAVING LITTLE ENERGY: 0
8. MOVING OR SPEAKING SO SLOWLY THAT OTHER PEOPLE COULD HAVE NOTICED. OR THE OPPOSITE, BEING SO FIGETY OR RESTLESS THAT YOU HAVE BEEN MOVING AROUND A LOT MORE THAN USUAL: 0
5. POOR APPETITE OR OVEREATING: 0
SUM OF ALL RESPONSES TO PHQ9 QUESTIONS 1 & 2: 0
9. THOUGHTS THAT YOU WOULD BE BETTER OFF DEAD, OR OF HURTING YOURSELF: 0
1. LITTLE INTEREST OR PLEASURE IN DOING THINGS: 0
7. TROUBLE CONCENTRATING ON THINGS, SUCH AS READING THE NEWSPAPER OR WATCHING TELEVISION: 0
2. FEELING DOWN, DEPRESSED OR HOPELESS: 0
SUM OF ALL RESPONSES TO PHQ QUESTIONS 1-9: 0
SUM OF ALL RESPONSES TO PHQ QUESTIONS 1-9: 0

## 2023-04-03 NOTE — PROGRESS NOTES
Rona Dial (1975) 41 y.o. female here for evaluation of the following chief complaint(s):      HPI:  Chief Complaint   Patient presents with    Rash     ABD    Other     Lump bottom of neck right side       Right side lower neck. Seems more prominent. At times has some discomfort     Rash on lower abdomen. Abdominal folds. Use of powder. Rash better today    Vitals:    04/03/23 0819   BP: 118/82   Pulse: 68   Resp: 16       Weight loss surgery - gastric sleeve. Seem to have hit a wall with weight loss. Wt Readings from Last 3 Encounters:   04/03/23 244 lb 6.4 oz (110.9 kg)   12/22/22 242 lb (109.8 kg)   10/26/22 246 lb 12.8 oz (111.9 kg)       Body mass index is 40.36 kg/m². Patient Active Problem List   Diagnosis    Recurrent major depressive disorder, in full remission ([facility]       SUBJECTIVE/OBJECTIVE:  Review of Systems   Constitutional:  Negative for chills and fever. HENT: Negative. Respiratory:  Negative for cough and shortness of breath. Cardiovascular:  Negative for chest pain. Gastrointestinal:  Negative for abdominal pain and nausea. Skin:  Positive for rash. Neurological:  Negative for dizziness, light-headedness and headaches. Psychiatric/Behavioral: Negative. Physical Exam  Constitutional:       General: She is not in acute distress. Eyes:      Pupils: Pupils are equal, round, and reactive to light. Neck:      Thyroid: No thyroid mass, thyromegaly or thyroid tenderness. Cardiovascular:      Rate and Rhythm: Normal rate and regular rhythm. Heart sounds: No murmur heard. Pulmonary:      Effort: Pulmonary effort is normal.      Breath sounds: Normal breath sounds. No wheezing. Abdominal:      General: Bowel sounds are normal. There is no distension. Palpations: Abdomen is soft. Tenderness: There is no abdominal tenderness. Musculoskeletal:         General: No tenderness. Normal range of motion.       Cervical back: Normal range

## 2023-04-21 ENCOUNTER — TELEPHONE (OUTPATIENT)
Dept: PHARMACY | Facility: CLINIC | Age: 48
End: 2023-04-21

## 2023-04-29 ENCOUNTER — HOSPITAL ENCOUNTER (EMERGENCY)
Age: 48
Discharge: HOME HEALTH | End: 2023-04-29
Attending: EMERGENCY MEDICINE
Payer: COMMERCIAL

## 2023-04-29 ENCOUNTER — APPOINTMENT (OUTPATIENT)
Dept: GENERAL RADIOLOGY | Age: 48
End: 2023-04-29
Payer: COMMERCIAL

## 2023-04-29 VITALS
RESPIRATION RATE: 16 BRPM | OXYGEN SATURATION: 99 % | TEMPERATURE: 98.1 F | WEIGHT: 248 LBS | HEIGHT: 66 IN | HEART RATE: 69 BPM | BODY MASS INDEX: 39.86 KG/M2 | SYSTOLIC BLOOD PRESSURE: 164 MMHG | DIASTOLIC BLOOD PRESSURE: 93 MMHG

## 2023-04-29 DIAGNOSIS — I10 HYPERTENSION, UNSPECIFIED TYPE: ICD-10-CM

## 2023-04-29 DIAGNOSIS — R07.89 OTHER CHEST PAIN: Primary | ICD-10-CM

## 2023-04-29 LAB
ANION GAP SERPL CALC-SCNC: 12 MEQ/L (ref 8–16)
BASOPHILS ABSOLUTE: 0 THOU/MM3 (ref 0–0.1)
BASOPHILS NFR BLD AUTO: 0.4 %
BUN SERPL-MCNC: 10 MG/DL (ref 7–22)
CALCIUM SERPL-MCNC: 9.2 MG/DL (ref 8.5–10.5)
CHLORIDE SERPL-SCNC: 103 MEQ/L (ref 98–111)
CO2 SERPL-SCNC: 27 MEQ/L (ref 23–33)
CREAT SERPL-MCNC: 0.7 MG/DL (ref 0.4–1.2)
DEPRECATED RDW RBC AUTO: 39.8 FL (ref 35–45)
EOSINOPHIL NFR BLD AUTO: 0.8 %
EOSINOPHILS ABSOLUTE: 0.1 THOU/MM3 (ref 0–0.4)
ERYTHROCYTE [DISTWIDTH] IN BLOOD BY AUTOMATED COUNT: 11.9 % (ref 11.5–14.5)
GFR SERPL CREATININE-BSD FRML MDRD: > 60 ML/MIN/1.73M2
GLUCOSE SERPL-MCNC: 86 MG/DL (ref 70–108)
HCT VFR BLD AUTO: 44.8 % (ref 37–47)
HGB BLD-MCNC: 14.9 GM/DL (ref 12–16)
IMM GRANULOCYTES # BLD AUTO: 0.03 THOU/MM3 (ref 0–0.07)
IMM GRANULOCYTES NFR BLD AUTO: 0.3 %
LYMPHOCYTES ABSOLUTE: 3.8 THOU/MM3 (ref 1–4.8)
LYMPHOCYTES NFR BLD AUTO: 42.3 %
MCH RBC QN AUTO: 30.3 PG (ref 26–33)
MCHC RBC AUTO-ENTMCNC: 33.3 GM/DL (ref 32.2–35.5)
MCV RBC AUTO: 91.2 FL (ref 81–99)
MONOCYTES ABSOLUTE: 0.4 THOU/MM3 (ref 0.4–1.3)
MONOCYTES NFR BLD AUTO: 4.9 %
NEUTROPHILS NFR BLD AUTO: 51.3 %
NRBC BLD AUTO-RTO: 0 /100 WBC
NT-PROBNP SERPL IA-MCNC: 59.9 PG/ML (ref 0–124)
OSMOLALITY SERPL CALC.SUM OF ELEC: 281.5 MOSMOL/KG (ref 275–300)
PLATELET # BLD AUTO: 324 THOU/MM3 (ref 130–400)
PMV BLD AUTO: 11.1 FL (ref 9.4–12.4)
POTASSIUM SERPL-SCNC: 3.6 MEQ/L (ref 3.5–5.2)
RBC # BLD AUTO: 4.91 MILL/MM3 (ref 4.2–5.4)
SEGMENTED NEUTROPHILS ABSOLUTE COUNT: 4.6 THOU/MM3 (ref 1.8–7.7)
SODIUM SERPL-SCNC: 142 MEQ/L (ref 135–145)
TROPONIN T: < 0.01 NG/ML
WBC # BLD AUTO: 8.9 THOU/MM3 (ref 4.8–10.8)

## 2023-04-29 PROCEDURE — 80048 BASIC METABOLIC PNL TOTAL CA: CPT

## 2023-04-29 PROCEDURE — 93005 ELECTROCARDIOGRAM TRACING: CPT | Performed by: EMERGENCY MEDICINE

## 2023-04-29 PROCEDURE — 99285 EMERGENCY DEPT VISIT HI MDM: CPT

## 2023-04-29 PROCEDURE — 84484 ASSAY OF TROPONIN QUANT: CPT

## 2023-04-29 PROCEDURE — 83880 ASSAY OF NATRIURETIC PEPTIDE: CPT

## 2023-04-29 PROCEDURE — 71045 X-RAY EXAM CHEST 1 VIEW: CPT

## 2023-04-29 PROCEDURE — 85025 COMPLETE CBC W/AUTO DIFF WBC: CPT

## 2023-04-29 PROCEDURE — 36415 COLL VENOUS BLD VENIPUNCTURE: CPT

## 2023-04-29 PROCEDURE — 6370000000 HC RX 637 (ALT 250 FOR IP): Performed by: STUDENT IN AN ORGANIZED HEALTH CARE EDUCATION/TRAINING PROGRAM

## 2023-04-29 RX ORDER — HYDROXYZINE PAMOATE 25 MG/1
25 CAPSULE ORAL ONCE
Status: COMPLETED | OUTPATIENT
Start: 2023-04-29 | End: 2023-04-29

## 2023-04-29 RX ADMIN — ALUMINUM HYDROXIDE, MAGNESIUM HYDROXIDE, AND SIMETHICONE: 200; 200; 20 SUSPENSION ORAL at 22:11

## 2023-04-29 RX ADMIN — HYDROXYZINE PAMOATE 25 MG: 25 CAPSULE ORAL at 22:14

## 2023-04-29 ASSESSMENT — PAIN - FUNCTIONAL ASSESSMENT
PAIN_FUNCTIONAL_ASSESSMENT: 0-10
PAIN_FUNCTIONAL_ASSESSMENT: 0-10
PAIN_FUNCTIONAL_ASSESSMENT: NONE - DENIES PAIN

## 2023-04-29 ASSESSMENT — PAIN SCALES - GENERAL
PAINLEVEL_OUTOF10: 4
PAINLEVEL_OUTOF10: 3

## 2023-04-30 LAB
EKG ATRIAL RATE: 58 BPM
EKG P AXIS: 46 DEGREES
EKG P-R INTERVAL: 180 MS
EKG Q-T INTERVAL: 410 MS
EKG QRS DURATION: 78 MS
EKG QTC CALCULATION (BAZETT): 402 MS
EKG R AXIS: 29 DEGREES
EKG T AXIS: 61 DEGREES
EKG VENTRICULAR RATE: 58 BPM

## 2023-04-30 PROCEDURE — 93010 ELECTROCARDIOGRAM REPORT: CPT | Performed by: INTERNAL MEDICINE

## 2023-04-30 ASSESSMENT — ENCOUNTER SYMPTOMS
VOMITING: 0
NAUSEA: 0
SHORTNESS OF BREATH: 0
DIARRHEA: 0
COUGH: 1
CONSTIPATION: 0
ABDOMINAL PAIN: 0

## 2023-04-30 ASSESSMENT — HEART SCORE
ECG: 0
ECG: 0

## 2023-05-01 ENCOUNTER — TELEPHONE (OUTPATIENT)
Dept: FAMILY MEDICINE CLINIC | Age: 48
End: 2023-05-01

## 2023-05-01 ENCOUNTER — CARE COORDINATION (OUTPATIENT)
Dept: OTHER | Facility: CLINIC | Age: 48
End: 2023-05-01

## 2023-05-01 ENCOUNTER — OFFICE VISIT (OUTPATIENT)
Dept: FAMILY MEDICINE CLINIC | Age: 48
End: 2023-05-01
Payer: COMMERCIAL

## 2023-05-01 VITALS
SYSTOLIC BLOOD PRESSURE: 126 MMHG | RESPIRATION RATE: 16 BRPM | WEIGHT: 242.5 LBS | HEART RATE: 80 BPM | BODY MASS INDEX: 39.14 KG/M2 | DIASTOLIC BLOOD PRESSURE: 74 MMHG

## 2023-05-01 DIAGNOSIS — R07.89 ATYPICAL CHEST PAIN: ICD-10-CM

## 2023-05-01 DIAGNOSIS — E66.01 OBESITY, MORBID, BMI 40.0-49.9 (HCC): Primary | ICD-10-CM

## 2023-05-01 DIAGNOSIS — Z90.3 S/P GASTRIC SLEEVE PROCEDURE: ICD-10-CM

## 2023-05-01 DIAGNOSIS — F33.42 RECURRENT MAJOR DEPRESSIVE DISORDER, IN FULL REMISSION (HCC): ICD-10-CM

## 2023-05-01 PROCEDURE — 99213 OFFICE O/P EST LOW 20 MIN: CPT | Performed by: NURSE PRACTITIONER

## 2023-05-01 RX ORDER — PHENTERMINE HYDROCHLORIDE 37.5 MG/1
37.5 CAPSULE ORAL EVERY MORNING
Qty: 30 CAPSULE | Refills: 0 | Status: SHIPPED | OUTPATIENT
Start: 2023-05-01 | End: 2023-05-31

## 2023-05-01 ASSESSMENT — ENCOUNTER SYMPTOMS
NAUSEA: 0
SHORTNESS OF BREATH: 0
ABDOMINAL PAIN: 0
COUGH: 0

## 2023-05-01 NOTE — CARE COORDINATION
Ambulatory Care Coordination Note    ACM attempted to reach patient for introduction to Associate Care Management related to ED visit for chest pain on 4/29/23 . HIPAA compliant message left requesting a return phone call at patient convenience. Plan for follow-up call in 1-2 days    THADDEUS Jenkins - CNP PCP - General PCP - [facility] Provider Family Nurse Practitioner [phone] [address] [place] [address] [address] [facility] [facility]      Next Steps: Go on 5/1/2023  Appointment:   Instructions: ED follow up         Future Appointments   Date Time Provider Destiny Moon   5/1/2023  1:30 PM THADDEUS Jenkins - Aida BassN, RN- [facility]  Associate Care Manager  [phone]  [email or web address]

## 2023-05-01 NOTE — PROGRESS NOTES
Corrie Sanchez (1975) 41 y.o. female here for evaluation of the following chief complaint(s):      HPI:  Chief Complaint   Patient presents with    Follow-up     1 month follow up weight     Follow-Up from Hospital     in the ER [address]    Patient is a 74-year-old female with PMHx of depression and obesity who presents to the [facility] ED with CC: Chest pain/heartburn, some accompanying upper back pain, and jaw tightness with fatigue. Patient states that this afternoon she had a surprise birthday party. She says that said party she had some pizza. Following [place] that she had some GERD symptoms. She said this eventually included some accompanying upper back pain. Then eventually the jaw tightness started. She reports the fatigue being throughout the day. She says this is the first time this is happened. Reports some slightly tingling lips, slight headache this afternoon, a cough, and constipation which is chronic for her. Denies palpitations, nausea, vomiting, diarrhea, dysuria, hematuria, joint pain, rashes. Does endorse some accompanying anxiety with this episode as well. Patient's past surgical history is significant for endometrial ablation, cholecystectomy, hysterectomy, ovarian cyst removal, gastric sleeve, an EGD and colonoscopy. 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 MEDICAL DECISION MAKING   Initial Assessment:   Chest pain: Patient endorses nebulous description of chest pain. Describes it as GERD like. Says it occurred after eating pizza at a party. Also endorses some back pain which she says is not really pain but irritation. Also complains of some jaw tightness. Presented with /95. Upon sitting and relaxing in ED it dropped to 165/91. EKG and troponin are negative. Patient endorsed some relief after receiving GI cocktail and hydroxyzine.   Postsurgical

## 2023-05-02 ENCOUNTER — CARE COORDINATION (OUTPATIENT)
Dept: OTHER | Facility: CLINIC | Age: 48
End: 2023-05-02

## 2023-05-02 NOTE — CARE COORDINATION
Ambulatory Care Coordination Note    ACM attempted 2nd outreach to patient for introduction to Associate Care Management related to ED visit for chest pain on 4/29/23. Susy Hedrick HIPAA compliant message left requesting a return phone call at patient convenience. Unable to Reach Letter sent to patient via My Chart. Will continue to outreach. Future Appointments   Date Time Provider Destiny Moon   5/30/2023 11:30 AM [facility]        Scarlet COOPER, RN- [facility]  Associate Care Manager  [phone]  [email or web address]

## 2023-05-05 NOTE — TELEPHONE ENCOUNTER
Second attempt made to contact patient to schedule 2023 yearly pharmacist appointment to discuss medications for Diabetes Management Program.    No answer. Left VM. Please call back at [phone], option #3. Vay[employer] message sent.         Rosa Boo, [address]   Phone: [phone], option #3       For Pharmacy Admin Tracking Only    Program: [address] in place:  No  Gap Closed?: No   Time Spent (min): 10

## 2023-05-10 ENCOUNTER — CARE COORDINATION (OUTPATIENT)
Dept: OTHER | Facility: CLINIC | Age: 48
End: 2023-05-10

## 2023-05-30 ENCOUNTER — TELEMEDICINE (OUTPATIENT)
Dept: FAMILY MEDICINE CLINIC | Age: 48
End: 2023-05-30
Payer: COMMERCIAL

## 2023-05-30 DIAGNOSIS — Z90.3 S/P GASTRIC SLEEVE PROCEDURE: ICD-10-CM

## 2023-05-30 DIAGNOSIS — E66.01 OBESITY, MORBID, BMI 40.0-49.9 (HCC): Primary | ICD-10-CM

## 2023-05-30 PROCEDURE — 99213 OFFICE O/P EST LOW 20 MIN: CPT | Performed by: NURSE PRACTITIONER

## 2023-05-30 RX ORDER — PHENTERMINE HYDROCHLORIDE 37.5 MG/1
37.5 CAPSULE ORAL EVERY MORNING
Qty: 30 CAPSULE | Refills: 0 | Status: SHIPPED | OUTPATIENT
Start: 2023-05-30 | End: 2023-06-29

## 2023-05-30 ASSESSMENT — ENCOUNTER SYMPTOMS
SHORTNESS OF BREATH: 0
ABDOMINAL PAIN: 0
NAUSEA: 0
COUGH: 0

## 2023-05-30 NOTE — PROGRESS NOTES
Subjective:      Patient ID: Reynold Saenz is a 57 y.o. female    HPI: 1 month Follow Up    TELEHEALTH EVALUATION -- Audio/Visual     Patient identification was verified at the start of the visit: Yes    Services were provided through a video synchronous discussion virtually to substitute for in-person clinic visit. Patient and provider were located at their individual homes. Patient has requested an audio/video evaluation for the following concern(s):    Chief Complaint   Patient presents with    1 Month Follow-Up       Hx of Weight loss surgery - gastric sleeve. Seem to have hit a wall with weight loss. Adipex #1. 2# weight loss. Was more consistent taking it this month. Home weight was 232#. 10# weight loss. Wt Readings from Last 3 Encounters:   05/01/23 242 lb 8 oz (110 kg)   04/29/23 248 lb (112.5 kg)   04/03/23 244 lb 6.4 oz (110.9 kg)       There is no height or weight on file to calculate BMI. Patient Active Problem List   Diagnosis    Recurrent major depressive disorder, in full remission ([facility] [address]       Review of Systems   Constitutional:  Negative for chills and fever. HENT: Negative. Respiratory:  Negative for cough and shortness of breath. Cardiovascular:  Negative for chest pain. Gastrointestinal:  Negative for abdominal pain and nausea. Skin:  Negative for rash. Neurological:  Negative for dizziness, light-headedness and headaches. Psychiatric/Behavioral: Negative. Objective:   Physical Exam  Constitutional:       General: She is not in acute distress. Appearance: She is not ill-appearing. Pulmonary:      Effort: Pulmonary effort is normal. No respiratory distress. Neurological:      Mental Status: She is alert and oriented to person, place, and time. Psychiatric:         Mood and Affect: Mood normal.         Behavior: Behavior normal.       Assessment:       Diagnosis Orders   1.  Obesity, morbid, BMI 40.0-49.9 ([facility])  phentermine (ADIPEX-P) 37.5 MG

## 2023-06-08 LAB
CHOLEST SERPL-MCNC: 226 MG/DL (ref 0–199)
FASTING: YES
GLUCOSE SERPL-MCNC: 99 MG/DL (ref 74–109)
HDLC SERPL-MCNC: 66 MG/DL (ref 40–90)
LDLC SERPL CALC-MCNC: 134 MG/DL
TRIGL SERPL-MCNC: 130 MG/DL (ref 0–199)

## 2023-06-26 ENCOUNTER — CLINICAL DOCUMENTATION (OUTPATIENT)
Dept: PHARMACY | Facility: CLINIC | Age: 48
End: 2023-06-26

## 2023-09-05 ENCOUNTER — HOSPITAL ENCOUNTER (EMERGENCY)
Age: 48
Discharge: HOME | End: 2023-09-05
Payer: COMMERCIAL

## 2023-09-05 ENCOUNTER — APPOINTMENT (OUTPATIENT)
Dept: GENERAL RADIOLOGY | Age: 48
End: 2023-09-05
Payer: COMMERCIAL

## 2023-09-05 VITALS
SYSTOLIC BLOOD PRESSURE: 196 MMHG | WEIGHT: 250 LBS | RESPIRATION RATE: 18 BRPM | HEIGHT: 66 IN | OXYGEN SATURATION: 99 % | BODY MASS INDEX: 40.18 KG/M2 | DIASTOLIC BLOOD PRESSURE: 86 MMHG | HEART RATE: 67 BPM | TEMPERATURE: 97.4 F

## 2023-09-05 DIAGNOSIS — M79.671 RIGHT FOOT PAIN: Primary | ICD-10-CM

## 2023-09-05 DIAGNOSIS — M77.31 HEEL SPUR, RIGHT: ICD-10-CM

## 2023-09-05 PROCEDURE — 73630 X-RAY EXAM OF FOOT: CPT

## 2023-09-05 PROCEDURE — 99213 OFFICE O/P EST LOW 20 MIN: CPT | Performed by: NURSE PRACTITIONER

## 2023-09-05 PROCEDURE — 99213 OFFICE O/P EST LOW 20 MIN: CPT

## 2023-09-05 RX ORDER — IBUPROFEN 400 MG/1
400 TABLET ORAL EVERY 6 HOURS PRN
Qty: 120 TABLET | Refills: 0 | Status: SHIPPED | OUTPATIENT
Start: 2023-09-05

## 2023-09-05 RX ORDER — MENTHOL 787.5 MG/1
PATCH TOPICAL
Refills: 0 | COMMUNITY
Start: 2023-09-05

## 2023-09-05 ASSESSMENT — ENCOUNTER SYMPTOMS
STRIDOR: 0
APNEA: 0
SHORTNESS OF BREATH: 0
WHEEZING: 0
COUGH: 0
CHOKING: 0
BACK PAIN: 0
CHEST TIGHTNESS: 0

## 2023-09-05 ASSESSMENT — PAIN SCALES - GENERAL: PAINLEVEL_OUTOF10: 7

## 2023-09-05 ASSESSMENT — PAIN - FUNCTIONAL ASSESSMENT: PAIN_FUNCTIONAL_ASSESSMENT: 0-10

## 2023-09-05 ASSESSMENT — PAIN DESCRIPTION - DESCRIPTORS: DESCRIPTORS: STABBING

## 2023-09-05 ASSESSMENT — PAIN DESCRIPTION - ORIENTATION: ORIENTATION: RIGHT

## 2023-09-05 ASSESSMENT — PAIN DESCRIPTION - LOCATION: LOCATION: FOOT

## 2023-09-05 NOTE — DISCHARGE INSTRUCTIONS
Ice, elevation 15-20 minutes 3 times a day for the first 24-48 hours followed with heat 15-20 minutes 3 times a day thereafter. Activity as tolerated.       Take medication as directed  Return for worsening symptoms, otherwise if symptoms persist follow up orthopedics in 1 to 3 days

## 2023-09-05 NOTE — ED NOTES
Pt with complaints of right foot pain that she noticed today at work. States yesterday she ran a 5k which is unlike her but does not remember an injury happening. States she did run, walk and jog during the [address]. States she took Tylenol but it did not help with pain.      Lata Burk, SUNDAR  35/43/53 [id number]

## 2023-09-07 ENCOUNTER — PATIENT MESSAGE (OUTPATIENT)
Dept: FAMILY MEDICINE CLINIC | Age: 48
End: 2023-09-07

## 2023-09-28 ENCOUNTER — COMMUNITY OUTREACH (OUTPATIENT)
Dept: FAMILY MEDICINE CLINIC | Age: 48
End: 2023-09-28

## 2023-12-23 ENCOUNTER — TELEPHONE (OUTPATIENT)
Dept: FAMILY MEDICINE CLINIC | Age: 48
End: 2023-12-23

## 2023-12-23 DIAGNOSIS — F33.42 RECURRENT MAJOR DEPRESSIVE DISORDER, IN FULL REMISSION (HCC): Primary | ICD-10-CM

## 2023-12-23 RX ORDER — FLUOXETINE HYDROCHLORIDE 20 MG/1
60 CAPSULE ORAL DAILY
Qty: 90 CAPSULE | Refills: 0 | Status: SHIPPED | OUTPATIENT
Start: 2023-12-23

## 2023-12-23 NOTE — TELEPHONE ENCOUNTER
Pt called 12/23/2023 at 8:06 AM.  Is out of her prozac. Ep'ed 1 month of prozac to [facility].   Electronically signed by Gisela Whitney MD on 12/23/2023 at 8:40 AM

## 2024-01-11 ENCOUNTER — TELEMEDICINE (OUTPATIENT)
Dept: FAMILY MEDICINE CLINIC | Age: 49
End: 2024-01-11
Payer: COMMERCIAL

## 2024-01-11 DIAGNOSIS — E66.01 OBESITY, CLASS III, BMI 40-49.9 (MORBID OBESITY) (HCC): ICD-10-CM

## 2024-01-11 DIAGNOSIS — F33.42 RECURRENT MAJOR DEPRESSIVE DISORDER, IN FULL REMISSION (HCC): Primary | ICD-10-CM

## 2024-01-11 PROCEDURE — 99213 OFFICE O/P EST LOW 20 MIN: CPT | Performed by: NURSE PRACTITIONER

## 2024-01-11 RX ORDER — BUPROPION HYDROCHLORIDE 150 MG/1
150 TABLET ORAL EVERY MORNING
Qty: 90 TABLET | Refills: 3 | Status: SHIPPED | OUTPATIENT
Start: 2024-01-11

## 2024-01-11 RX ORDER — FLUOXETINE HYDROCHLORIDE 20 MG/1
60 CAPSULE ORAL DAILY
Qty: 270 CAPSULE | Refills: 3 | Status: SHIPPED | OUTPATIENT
Start: 2024-01-11

## 2024-01-11 ASSESSMENT — ENCOUNTER SYMPTOMS
ABDOMINAL PAIN: 0
SHORTNESS OF BREATH: 0
COUGH: 0
NAUSEA: 0

## 2024-01-11 NOTE — PROGRESS NOTES
Subjective:      Patient ID: Francheska Johnson is a 48 y.o. female    HPI: 1 month Follow Up    TELEHEALTH EVALUATION -- Audio/Visual     Patient identification was verified at the start of the visit: Yes    Services were provided through a video synchronous discussion virtually to substitute for in-person clinic visit. Patient and provider were located at their individual homes.    Patient has requested an audio/video evaluation for the following concern(s):    Chief Complaint   Patient presents with    1 Month Follow-Up       On Prozac 60 mg and Wellbutrin 150 mg XL.   Good benefit seen with addition of Wellbutrin.  Feels like her normal self.  More controlled.   NO SE.   Continues with a lot of family stressors and drama personally about her that is wearing.      Patient Active Problem List   Diagnosis    Recurrent major depressive disorder, in full remission ([facility])       Review of Systems   Constitutional:  Negative for chills and fever.   HENT: Negative.     Respiratory:  Negative for cough and shortness of breath.    Cardiovascular:  Negative for chest pain.   Gastrointestinal:  Negative for abdominal pain and nausea.   Skin:  Negative for rash.   Neurological:  Negative for dizziness, light-headedness and headaches.   Psychiatric/Behavioral: Negative.         Objective:   Physical Exam  Constitutional:       General: She is not in acute distress.     Appearance: She is not ill-appearing.   Pulmonary:      Effort: Pulmonary effort is normal. No respiratory distress.   Neurological:      Mental Status: She is alert and oriented to person, place, and time.   Psychiatric:         Mood and Affect: Mood normal.         Behavior: Behavior normal.         Assessment:       Diagnosis Orders   1. Recurrent major depressive disorder, in full remission ([facility])  buPROPion (WELLBUTRIN XL) 150 MG extended release tablet    FLUoxetine (PROZAC) 20 MG capsule      2. Obesity, Class III, BMI 40-49.9 (morbid obesity) ([facility])

## 2024-05-03 ENCOUNTER — TELEPHONE (OUTPATIENT)
Dept: BARIATRICS/WEIGHT MGMT | Age: 49
End: 2024-05-03

## 2024-07-29 DIAGNOSIS — Z98.84 S/P LAPAROSCOPIC SLEEVE GASTRECTOMY: Primary | ICD-10-CM

## 2024-07-29 DIAGNOSIS — Z13.21 MALNUTRITION SCREEN: ICD-10-CM

## 2024-07-29 DIAGNOSIS — K91.2 POSTOPERATIVE MALABSORPTION: ICD-10-CM

## 2024-07-31 ENCOUNTER — LAB (OUTPATIENT)
Dept: LAB | Age: 49
End: 2024-07-31

## 2024-07-31 DIAGNOSIS — K91.2 POSTOPERATIVE MALABSORPTION: ICD-10-CM

## 2024-07-31 DIAGNOSIS — Z13.21 MALNUTRITION SCREEN: ICD-10-CM

## 2024-07-31 DIAGNOSIS — Z98.84 S/P LAPAROSCOPIC SLEEVE GASTRECTOMY: ICD-10-CM

## 2024-07-31 LAB
25(OH)D3 SERPL-MCNC: 51 NG/ML (ref 30–100)
ALBUMIN SERPL BCG-MCNC: 4.1 G/DL (ref 3.5–5.1)
ALP SERPL-CCNC: 68 U/L (ref 38–126)
ALT SERPL W/O P-5'-P-CCNC: 28 U/L (ref 11–66)
ANION GAP SERPL CALC-SCNC: 10 MEQ/L (ref 8–16)
AST SERPL-CCNC: 25 U/L (ref 5–40)
BASOPHILS ABSOLUTE: 0 THOU/MM3 (ref 0–0.1)
BASOPHILS NFR BLD AUTO: 0.4 %
BILIRUB SERPL-MCNC: 0.4 MG/DL (ref 0.3–1.2)
BUN SERPL-MCNC: 12 MG/DL (ref 7–22)
CALCIUM SERPL-MCNC: 8.6 MG/DL (ref 8.5–10.5)
CHLORIDE SERPL-SCNC: 101 MEQ/L (ref 98–111)
CHOLEST SERPL-MCNC: 225 MG/DL (ref 100–199)
CO2 SERPL-SCNC: 25 MEQ/L (ref 23–33)
CREAT SERPL-MCNC: 0.7 MG/DL (ref 0.4–1.2)
DEPRECATED MEAN GLUCOSE BLD GHB EST-ACNC: 111 MG/DL (ref 70–126)
DEPRECATED RDW RBC AUTO: 39.5 FL (ref 35–45)
EOSINOPHIL NFR BLD AUTO: 0.8 %
EOSINOPHILS ABSOLUTE: 0.1 THOU/MM3 (ref 0–0.4)
ERYTHROCYTE [DISTWIDTH] IN BLOOD BY AUTOMATED COUNT: 12 % (ref 11.5–14.5)
FERRITIN SERPL IA-MCNC: 141 NG/ML (ref 10–291)
FOLATE SERPL-MCNC: > 20 NG/ML (ref 4.8–24.2)
GFR SERPL CREATININE-BSD FRML MDRD: > 90 ML/MIN/1.73M2
GLUCOSE SERPL-MCNC: 114 MG/DL (ref 70–108)
HBA1C MFR BLD HPLC: 5.7 % (ref 4.4–6.4)
HCT VFR BLD AUTO: 42.1 % (ref 37–47)
HDLC SERPL-MCNC: 61 MG/DL
IMM GRANULOCYTES # BLD AUTO: 0.02 THOU/MM3 (ref 0–0.07)
IRON SERPL-MCNC: 89 UG/DL (ref 50–170)
LDLC SERPL CALC-MCNC: 134 MG/DL
LYMPHOCYTES ABSOLUTE: 2.1 THOU/MM3 (ref 1–4.8)
LYMPHOCYTES NFR BLD AUTO: 28.7 %
MCH RBC QN AUTO: 30.5 PG (ref 26–33)
MCHC RBC AUTO-ENTMCNC: 33.5 GM/DL (ref 32.2–35.5)
MCV RBC AUTO: 90.9 FL (ref 81–99)
MONOCYTES ABSOLUTE: 0.3 THOU/MM3 (ref 0.4–1.3)
MONOCYTES NFR BLD AUTO: 4.8 %
NEUTROPHILS ABSOLUTE: 4.7 THOU/MM3 (ref 1.8–7.7)
NEUTROPHILS NFR BLD AUTO: 65 %
NRBC BLD AUTO-RTO: 0 /100 WBC
PLATELET # BLD AUTO: 300 THOU/MM3 (ref 130–400)
PMV BLD AUTO: 11.3 FL (ref 9.4–12.4)
POTASSIUM SERPL-SCNC: 4.5 MEQ/L (ref 3.5–5.2)
PREALB SERPL-MCNC: 22.9 MG/DL (ref 20–40)
PROT SERPL-MCNC: 6.8 G/DL (ref 6.1–8)
PTH-INTACT SERPL-MCNC: 63.8 PG/ML (ref 15–65)
RBC # BLD AUTO: 4.63 MILL/MM3 (ref 4.2–5.4)
SODIUM SERPL-SCNC: 136 MEQ/L (ref 135–145)
TIBC SERPL-MCNC: 251 UG/DL (ref 171–450)
TRIGL SERPL-MCNC: 149 MG/DL (ref 0–199)
TSH SERPL DL<=0.005 MIU/L-ACNC: 1.75 UIU/ML (ref 0.4–4.2)
VIT B12 SERPL-MCNC: 571 PG/ML (ref 211–911)
WBC # BLD AUTO: 7.2 THOU/MM3 (ref 4.8–10.8)

## 2024-08-05 LAB — VIT B1 PYROPHOSHATE BLD-SCNC: 263 NMOL/L (ref 70–180)

## 2024-08-14 NOTE — PROGRESS NOTES
Physical Activity: Not on file   Stress: Not on file   Social Connections: Not on file   Intimate Partner Violence: Not on file   Housing Stability: Unknown (4/3/2023)    Housing Stability Vital Sign     Unable to Pay for Housing in the Last Year: Not on file     Number of Places Lived in the Last Year: Not on file     Unstable Housing in the Last Year: No        Medications:   Current Outpatient Medications   Medication Sig Dispense Refill    docusate sodium (COLACE) 100 MG capsule Take 1 capsule by mouth 2 times daily      b complex vitamins capsule Take 1 capsule by mouth daily      buPROPion (WELLBUTRIN XL) 150 MG extended release tablet Take 1 tablet by mouth every morning 90 tablet 3    FLUoxetine (PROZAC) 20 MG capsule Take 3 capsules by mouth daily 270 capsule 3    Cholecalciferol (VITAMIN D3) 1.25 MG (62914 UT) CAPS Take 1 capsule by mouth daily      Biotin 76981 MCG TABS Take by mouth      Multiple Vitamins-Minerals (THERAPEUTIC MULTIVITAMIN-MINERALS) tablet Take 1 tablet by mouth daily Womans 1 a day OTC      hydrOXYzine HCl (ATARAX) 25 MG tablet Take 1 tablet by mouth every 8 hours as needed for Anxiety (Patient not taking: Reported on 8/15/2024) 30 tablet 1    Menthol, Topical Analgesic, (ICY HOT) 7.5 % (Roll) MISC Package directions (Patient not taking: Reported on 8/15/2024)  0    calcium citrate (CALCITRATE) 250 MG TABS tablet Take 2 tablets by mouth 2 times daily (Patient not taking: Reported on 8/15/2024)       No current facility-administered medications for this visit.       Allergies:   No Known Allergies    Subjective:    Review of Systems:  Constitutional: Denies any fever, chills, fatigue.  Wound: Denies any rash, skin color changes or wound problems.  Resp: Denies any cough, shortness of breath.  CV: Denies any chest pain, orthopnea or syncope.   MS: Denies myalgias, arthralgias.  GI: Denies any nausea, vomiting, diarrhea, constipation, melena, hematochezia. No incisional discomfort.  :

## 2024-08-15 ENCOUNTER — OFFICE VISIT (OUTPATIENT)
Dept: BARIATRICS/WEIGHT MGMT | Age: 49
End: 2024-08-15
Payer: COMMERCIAL

## 2024-08-15 VITALS
TEMPERATURE: 97.2 F | HEART RATE: 81 BPM | BODY MASS INDEX: 42.85 KG/M2 | WEIGHT: 257.2 LBS | SYSTOLIC BLOOD PRESSURE: 122 MMHG | DIASTOLIC BLOOD PRESSURE: 84 MMHG | HEIGHT: 65 IN

## 2024-08-15 DIAGNOSIS — Z98.84 S/P LAPAROSCOPIC SLEEVE GASTRECTOMY: Primary | ICD-10-CM

## 2024-08-15 DIAGNOSIS — Z13.21 SCREENING FOR MALNUTRITION: ICD-10-CM

## 2024-08-15 DIAGNOSIS — F32.A DEPRESSION, UNSPECIFIED DEPRESSION TYPE: ICD-10-CM

## 2024-08-15 PROCEDURE — 99214 OFFICE O/P EST MOD 30 MIN: CPT | Performed by: PHYSICIAN ASSISTANT

## 2024-08-15 RX ORDER — VITAMIN B COMPLEX
1 CAPSULE ORAL DAILY
COMMUNITY

## 2024-08-15 RX ORDER — DOCUSATE SODIUM 100 MG/1
100 CAPSULE, LIQUID FILLED ORAL 2 TIMES DAILY
COMMUNITY

## 2024-08-22 ENCOUNTER — OFFICE VISIT (OUTPATIENT)
Dept: FAMILY MEDICINE CLINIC | Age: 49
End: 2024-08-22
Payer: COMMERCIAL

## 2024-08-22 VITALS
RESPIRATION RATE: 18 BRPM | WEIGHT: 257.8 LBS | BODY MASS INDEX: 42.57 KG/M2 | HEART RATE: 64 BPM | SYSTOLIC BLOOD PRESSURE: 122 MMHG | DIASTOLIC BLOOD PRESSURE: 86 MMHG

## 2024-08-22 DIAGNOSIS — F33.42 RECURRENT MAJOR DEPRESSIVE DISORDER, IN FULL REMISSION (HCC): ICD-10-CM

## 2024-08-22 DIAGNOSIS — Z00.00 WELL ADULT EXAM: Primary | ICD-10-CM

## 2024-08-22 DIAGNOSIS — Z12.31 BREAST CANCER SCREENING BY MAMMOGRAM: ICD-10-CM

## 2024-08-22 DIAGNOSIS — Z90.3 S/P GASTRIC SLEEVE PROCEDURE: ICD-10-CM

## 2024-08-22 DIAGNOSIS — E66.01 OBESITY, CLASS III, BMI 40-49.9 (MORBID OBESITY) (HCC): ICD-10-CM

## 2024-08-22 PROCEDURE — 99396 PREV VISIT EST AGE 40-64: CPT | Performed by: NURSE PRACTITIONER

## 2024-08-22 RX ORDER — SEMAGLUTIDE 0.25 MG/.5ML
0.25 INJECTION, SOLUTION SUBCUTANEOUS
Qty: 2 ML | Refills: 0 | Status: SHIPPED | OUTPATIENT
Start: 2024-08-22

## 2024-08-22 RX ORDER — DIPHENHYDRAMINE HCL 25 MG
25 TABLET ORAL NIGHTLY PRN
COMMUNITY

## 2024-08-22 SDOH — ECONOMIC STABILITY: INCOME INSECURITY: HOW HARD IS IT FOR YOU TO PAY FOR THE VERY BASICS LIKE FOOD, HOUSING, MEDICAL CARE, AND HEATING?: NOT HARD AT ALL

## 2024-08-22 SDOH — ECONOMIC STABILITY: FOOD INSECURITY: WITHIN THE PAST 12 MONTHS, YOU WORRIED THAT YOUR FOOD WOULD RUN OUT BEFORE YOU GOT MONEY TO BUY MORE.: NEVER TRUE

## 2024-08-22 SDOH — ECONOMIC STABILITY: FOOD INSECURITY: WITHIN THE PAST 12 MONTHS, THE FOOD YOU BOUGHT JUST DIDN'T LAST AND YOU DIDN'T HAVE MONEY TO GET MORE.: NEVER TRUE

## 2024-08-22 ASSESSMENT — PATIENT HEALTH QUESTIONNAIRE - PHQ9
7. TROUBLE CONCENTRATING ON THINGS, SUCH AS READING THE NEWSPAPER OR WATCHING TELEVISION: SEVERAL DAYS
3. TROUBLE FALLING OR STAYING ASLEEP: NOT AT ALL
6. FEELING BAD ABOUT YOURSELF - OR THAT YOU ARE A FAILURE OR HAVE LET YOURSELF OR YOUR FAMILY DOWN: NOT AT ALL
SUM OF ALL RESPONSES TO PHQ QUESTIONS 1-9: 1
SUM OF ALL RESPONSES TO PHQ QUESTIONS 1-9: 1
1. LITTLE INTEREST OR PLEASURE IN DOING THINGS: NOT AT ALL
SUM OF ALL RESPONSES TO PHQ QUESTIONS 1-9: 1
1. LITTLE INTEREST OR PLEASURE IN DOING THINGS: NOT AT ALL
10. IF YOU CHECKED OFF ANY PROBLEMS, HOW DIFFICULT HAVE THESE PROBLEMS MADE IT FOR YOU TO DO YOUR WORK, TAKE CARE OF THINGS AT HOME, OR GET ALONG WITH OTHER PEOPLE: NOT DIFFICULT AT ALL
4. FEELING TIRED OR HAVING LITTLE ENERGY: NOT AT ALL
5. POOR APPETITE OR OVEREATING: NOT AT ALL
2. FEELING DOWN, DEPRESSED OR HOPELESS: NOT AT ALL
5. POOR APPETITE OR OVEREATING: NOT AT ALL
2. FEELING DOWN, DEPRESSED OR HOPELESS: NOT AT ALL
8. MOVING OR SPEAKING SO SLOWLY THAT OTHER PEOPLE COULD HAVE NOTICED. OR THE OPPOSITE, BEING SO FIGETY OR RESTLESS THAT YOU HAVE BEEN MOVING AROUND A LOT MORE THAN USUAL: NOT AT ALL
9. THOUGHTS THAT YOU WOULD BE BETTER OFF DEAD, OR OF HURTING YOURSELF: NOT AT ALL
SUM OF ALL RESPONSES TO PHQ9 QUESTIONS 1 & 2: 0
10. IF YOU CHECKED OFF ANY PROBLEMS, HOW DIFFICULT HAVE THESE PROBLEMS MADE IT FOR YOU TO DO YOUR WORK, TAKE CARE OF THINGS AT HOME, OR GET ALONG WITH OTHER PEOPLE: NOT DIFFICULT AT ALL
3. TROUBLE FALLING OR STAYING ASLEEP: NOT AT ALL
6. FEELING BAD ABOUT YOURSELF - OR THAT YOU ARE A FAILURE OR HAVE LET YOURSELF OR YOUR FAMILY DOWN: NOT AT ALL
SUM OF ALL RESPONSES TO PHQ QUESTIONS 1-9: 1
7. TROUBLE CONCENTRATING ON THINGS, SUCH AS READING THE NEWSPAPER OR WATCHING TELEVISION: SEVERAL DAYS
4. FEELING TIRED OR HAVING LITTLE ENERGY: NOT AT ALL
SUM OF ALL RESPONSES TO PHQ QUESTIONS 1-9: 1
8. MOVING OR SPEAKING SO SLOWLY THAT OTHER PEOPLE COULD HAVE NOTICED. OR THE OPPOSITE - BEING SO FIDGETY OR RESTLESS THAT YOU HAVE BEEN MOVING AROUND A LOT MORE THAN USUAL: NOT AT ALL

## 2024-08-22 ASSESSMENT — ENCOUNTER SYMPTOMS
ABDOMINAL PAIN: 0
NAUSEA: 0
SHORTNESS OF BREATH: 0
COUGH: 0

## 2024-08-22 NOTE — PROGRESS NOTES
Subjective   Patient ID: Bhaskar Ibarra is a 84 y.o. male is being seen for consultation today at the request of Katherine TELLEZ    History of Present Illness  Dear Colleague,  Bhaskar Ibarra was seen in our office today for evaluation of a right lower lobe lung nodule.  Patient is well known to me.  2 years ago he was found to have a right lower lobe nodule that was PET negative.  He had a CT directed needle biopsy which was positive for adenocarcinoma.  Because of his age and overall condition we elected to treat this with stereotactic radiation therapy.  He successfully completed this treatment.  He has recently had a CT scan which shows radiation changes with new nodule or changes.  He is here for further evaluation.    He reports no cough or hemoptysis.  There has been no hoarseness or change in his voice.  He has no pleuritic pain.  There is no unexplained weight loss.  He does get short of breath with exertion.    CT PET scan was ordered for further evaluation but was refused by insurance.  He is here today to discuss other options.    The following portions of the patient's history were reviewed and updated as appropriate: allergies, current medications, past family history, past medical history, past social history, past surgical history and problem list.  Review of Systems   Constitution: Negative.   HENT: Positive for congestion.    Eyes: Negative.    Cardiovascular: Negative.    Respiratory: Positive for cough.    Endocrine: Negative.    Hematologic/Lymphatic: Negative.    Skin: Negative.    Musculoskeletal: Negative.    Gastrointestinal: Negative.    Genitourinary: Negative.    Neurological: Negative.    Psychiatric/Behavioral: Negative.      Patient Active Problem List   Diagnosis   • Coronary arteriosclerosis in native artery   • Atrial fibrillation (CMS/HCC)   • Acute non-Q wave ST elevation myocardial infarction (STEMI) involving left anterior descending (LAD) coronary artery   • Mitral valve  42.1 07/31/2024    MCV 90.9 07/31/2024     07/31/2024         Health Maintenance   Topic Date Due    HIV screen  Never done    Hepatitis C screen  Never done    Hepatitis B vaccine (1 of 3 - 19+ 3-dose series) Never done    Cervical cancer screen  Never done    Breast cancer screen  05/25/2017    DTaP/Tdap/Td vaccine (2 - Td or Tdap) 03/07/2020    Colorectal Cancer Screen  Never done    COVID-19 Vaccine (1 - 2023-24 season) Never done    Depression Monitoring  04/03/2024    Flu vaccine (1) 08/01/2024    A1C test (Diabetic or Prediabetic)  07/31/2025    Lipids  07/31/2029    Hepatitis A vaccine  Aged Out    Hib vaccine  Aged Out    Polio vaccine  Aged Out    Meningococcal (ACWY) vaccine  Aged Out    Pneumococcal 0-64 years Vaccine  Aged Out    Diabetic foot exam  Discontinued    Diabetic Alb to Cr ratio (uACR) test  Discontinued    Diabetic retinal exam  Discontinued    GFR test (Diabetes, CKD 3-4, OR last GFR 15-59)  Discontinued       Immunization History   Administered Date(s) Administered    Hep A, HAVRIX, VAQTA, (age 19y+), IM, 1mL 06/20/2019    Influenza Virus Vaccine 10/15/2020    MMR, PRIORIX, M-M-R II, (age 12m+), SC, 0.5mL 10/09/2020    Pneumococcal, PPSV23, PNEUMOVAX 23, (age 2y+), SC/IM, 0.5mL 08/01/2017    TDaP, ADACEL (age 10y-64y), BOOSTRIX (age 10y+), IM, 0.5mL 03/07/2010    Varicella, VARIVAX, (age 12m+), SC, 0.5mL 11/10/2020       Review of Systems   Constitutional:  Negative for chills and fever.   HENT: Negative.     Respiratory:  Negative for cough and shortness of breath.    Cardiovascular:  Negative for chest pain.   Gastrointestinal:  Negative for abdominal pain and nausea.   Skin:  Negative for rash.   Neurological:  Negative for dizziness, light-headedness and headaches.   Psychiatric/Behavioral: Negative.         Objective:   Physical Exam  Constitutional:       General: She is not in acute distress.  Eyes:      Pupils: Pupils are equal, round, and reactive to light.  insufficiency   • S/P CABG (coronary artery bypass graft)   • S/P mitral valve repair   • Depression   • Hypothyroidism   • Hyperlipidemia   • Pulmonary nodule   • Adenocarcinoma of right lung (CMS/HCC)   • Benign nodular prostatic hyperplasia with lower urinary tract symptoms   • Confusion   • Post concussion syndrome   • Vitamin D deficiency   • Well adult health check   • Impaired fasting glucose   • AAA (abdominal aortic aneurysm) (CMS/HCC)     Past Medical History:   Diagnosis Date   • Abdominal aortic aneurysm (CMS/HCC)    • Acute myocardial infarction    • Acute renal failure (CMS/HCC)    • Anemia    • Aneurysm (CMS/HCC)    • Atrial fibrillation (CMS/HCC)    • Cardiomyopathy (CMS/HCC)    • Chest pain    • Coronary artery disease     July 2015-    • Depression    • GERD (gastroesophageal reflux disease)    • Hyperlipidemia    • Hypertension    • Hypothyroidism    • Lung cancer (CMS/HCC) 2016    radiation    • Mitral regurgitation    • Myocardial infarction    • Pleural effusion    • Pleural effusion, bilateral    • RLS (restless legs syndrome)    • Sleep apnea    • Ulcerative colitis (CMS/HCC)    • Ulcerative colitis (CMS/HCC)      Past Surgical History:   Procedure Laterality Date   • BACK SURGERY      lumbar   • CARDIAC CATHETERIZATION     • CORONARY ANGIOPLASTY     • CORONARY ARTERY BYPASS GRAFT  07/27/2015    X 5  Dr Louis   • HERNIA REPAIR      inguinal - left   • MITRAL VALVE REPAIR/REPLACEMENT  07/27/2015    Dr Louis   • OTHER SURGICAL HISTORY      Cardiovasc Endosc W/ Video-Assist Har[facility] Veins Coron Art Byp   • SKIN BIOPSY      benign     Family History   Problem Relation Age of Onset   • Coronary artery disease Brother    • Heart attack Brother      Social History     Social History   • Marital status:      Spouse name: N/A   • Number of children: N/A   • Years of education: N/A     Occupational History   • Not on file.     Social History Main Topics   • Smoking status: Former Smoker      Years: 5.00     Types: Cigarettes   • Smokeless tobacco: Never Used      Comment: 1 cig day x 5 years   • Alcohol use No   • Drug use: No   • Sexual activity: Defer     Other Topics Concern   • Not on file     Social History Narrative   • No narrative on file       Current Outpatient Prescriptions:   •  apixaban (ELIQUIS) 2.5 MG tablet tablet, Take 1 tablet by mouth Every 12 (Twelve) Hours., Disp: 30 tablet, Rfl: 0  •  aspirin 81 MG tablet, Take 81 mg by mouth Daily., Disp: , Rfl:   •  atorvastatin (LIPITOR) 40 MG tablet, , Disp: , Rfl:   •  azelastine (ASTELIN) 0.1 % nasal spray, 2 sprays into each nostril 2 (Two) Times a Day. Use in each nostril as directed, Disp: 30 mL, Rfl: 12  •  carvedilol (COREG) 3.125 MG tablet, TAKE 1 TABLET TWICE A DAY (NEED TO BE SEEN), Disp: 180 tablet, Rfl: 0  •  escitalopram (LEXAPRO) 20 MG tablet, TAKE 1 TABLET DAILY, Disp: 90 tablet, Rfl: 0  •  fenofibrate (TRICOR) 145 MG tablet, Take 1 tablet by mouth Daily., Disp: 90 tablet, Rfl: 3  •  furosemide (LASIX) 40 MG tablet, TAKE 1 TABLET DAILY HOLD IF BLOOD PRESSURE IS LESS THAN 100/60, Disp: 90 tablet, Rfl: 1  •  levothyroxine (SYNTHROID, LEVOTHROID) 88 MCG tablet, TAKE 1 TABLET DAILY (NEED APPOINTMENT IN JANUARY), Disp: 90 tablet, Rfl: 1  •  omeprazole (priLOSEC) 40 MG capsule, TAKE 1 CAPSULE DAILY (MUST MAKE APPOINTMENT), Disp: 90 capsule, Rfl: 1  •  rOPINIRole (REQUIP) 0.5 MG tablet, TAKE TWO TABLETS BY MOUTH ONE HOUR BEFORE BEDTIME, Disp: 60 tablet, Rfl: 3  •  sotalol (BETAPACE) 80 MG tablet, TAKE ONE-HALF (1/2) TABLET TWICE A DAY, Disp: 90 tablet, Rfl: 0  Allergies   Allergen Reactions   • Latex Itching        Objective   Vitals:    09/04/18 1007   BP: 128/63   Pulse: 61   SpO2: 96%     Physical Exam   Constitutional: He is oriented to person, place, and time. He appears well-developed and well-nourished.   HENT:   Head: Normocephalic.   Eyes: Pupils are equal, round, and reactive to light. Conjunctivae, EOM and lids are normal.      Neck: Trachea normal and normal range of motion. Neck supple. No hepatojugular reflux and no JVD present. Carotid bruit is not present. No thyroid mass and no thyromegaly present.   Cardiovascular: Normal rate, regular rhythm, S1 normal, S2 normal, normal heart sounds and normal pulses.   No extrasystoles are present. PMI is not displaced.    Pulmonary/Chest: Effort normal and breath sounds normal.   Abdominal: Soft. Normal appearance and bowel sounds are normal. He exhibits no mass. There is no hepatosplenomegaly. There is no tenderness. No hernia.   Musculoskeletal: Normal range of motion.   Neurological: He is alert and oriented to person, place, and time. He has normal strength and normal reflexes. No cranial nerve deficit or sensory deficit. He displays a negative Romberg sign.   Skin: Skin is warm, dry and intact.   Psychiatric: He has a normal mood and affect. His speech is normal and behavior is normal. Judgment and thought content normal. Cognition and memory are normal.     Independent Review of Radiographic Studies:    CT scan of the chest performed August 20, 2018 was independently reviewed.  There are radiation changes in the right lower lobe.  There is atelectasis associated with these changes.  This area appears more nodular along its superior margin.  There are no other infiltrates nodules or masses.  There is no pleural effusion.  I see no suspicious hilar or mediastinal adenopathy.      Assessment/Plan      This area is suspicious for recurrent cancer.  I have recommended CT PET scan to help with staging and to help make a decision about how to proceed in treatment.  Insurance company has elected to refuse CT PET scan.  I have discussed 2 alternative options with the patient.  One would be to proceed on with a CT directed needle biopsy.  The second would be wait 3 months and repeat a CT of the chest at that time.  The pros and cons of each of these options were discussed in detail.  The patient  wishes to go home and discuss this with his daughter before making a final decision.  We will await his final decision.  I will keep you informed of his progress.    Diagnoses and all orders for this visit:    Pulmonary nodule    Adenocarcinoma of right lung (CMS/HCC)    Other orders  -     atorvastatin (LIPITOR) 40 MG tablet;

## 2024-08-24 ENCOUNTER — HOSPITAL ENCOUNTER (EMERGENCY)
Age: 49
Discharge: HOME | End: 2024-08-24
Payer: COMMERCIAL

## 2024-08-24 VITALS
BODY MASS INDEX: 42.27 KG/M2 | DIASTOLIC BLOOD PRESSURE: 87 MMHG | RESPIRATION RATE: 20 BRPM | TEMPERATURE: 98.1 F | HEART RATE: 60 BPM | WEIGHT: 256 LBS | OXYGEN SATURATION: 98 % | SYSTOLIC BLOOD PRESSURE: 168 MMHG

## 2024-08-24 DIAGNOSIS — J01.00 ACUTE NON-RECURRENT MAXILLARY SINUSITIS: Primary | ICD-10-CM

## 2024-08-24 PROCEDURE — 99213 OFFICE O/P EST LOW 20 MIN: CPT

## 2024-08-24 PROCEDURE — 99213 OFFICE O/P EST LOW 20 MIN: CPT | Performed by: NURSE PRACTITIONER

## 2024-08-24 RX ORDER — LACTOBACILLUS RHAMNOSUS GG 10B CELL
1 CAPSULE ORAL 2 TIMES DAILY
Qty: 28 CAPSULE | Refills: 0 | Status: SHIPPED | OUTPATIENT
Start: 2024-08-24 | End: 2024-09-07

## 2024-08-24 ASSESSMENT — PAIN - FUNCTIONAL ASSESSMENT
PAIN_FUNCTIONAL_ASSESSMENT: 0-10
PAIN_FUNCTIONAL_ASSESSMENT: PREVENTS OR INTERFERES SOME ACTIVE ACTIVITIES AND ADLS

## 2024-08-24 ASSESSMENT — ENCOUNTER SYMPTOMS
WHEEZING: 0
VOMITING: 0
BLOOD IN STOOL: 0
ABDOMINAL PAIN: 0
DIARRHEA: 0
COUGH: 0
SINUS PRESSURE: 1
RHINORRHEA: 0
NAUSEA: 0
SHORTNESS OF BREATH: 0
CONSTIPATION: 0
EYE PAIN: 0

## 2024-08-24 ASSESSMENT — PAIN SCALES - GENERAL: PAINLEVEL_OUTOF10: 4

## 2024-08-24 ASSESSMENT — PAIN DESCRIPTION - ONSET: ONSET: PROGRESSIVE

## 2024-08-24 ASSESSMENT — PAIN DESCRIPTION - DESCRIPTORS: DESCRIPTORS: DISCOMFORT;SORE

## 2024-08-24 ASSESSMENT — PAIN DESCRIPTION - FREQUENCY: FREQUENCY: CONTINUOUS

## 2024-08-24 ASSESSMENT — PAIN DESCRIPTION - LOCATION: LOCATION: NECK

## 2024-08-24 ASSESSMENT — PAIN DESCRIPTION - PAIN TYPE: TYPE: ACUTE PAIN

## 2024-08-24 NOTE — ED PROVIDER NOTES
[facility] URGENT CARE  UrgentCare Encounter      CHIEFCOMPLAINT       Chief Complaint   Patient presents with    Sinusitis    Mouth Lesions     \"Roof of my mouth is sore\"    Fatigue    Lymphadenopathy     Right neck        Nurses Notes reviewed and I agree except as noted in the HPI.  HISTORY OF PRESENT ILLNESS   Francheska Johnson is a 49 y.o. female who presents to urgent care with complaint of sinus pressure, ear pain , fatigue, right neck lymph nodes swollen and pain on the roof of her mouth.  Patient states her symptoms started about 5 days ago.  She feels like she has pressure in her teeth and the roof of her mouth.  She also feels pain in her ear and it feels plugged.  She denies taking any medications for symptoms.  She denies other symptoms including chest pain, shortness of breath, nausea, vomiting, diarrhea or known fever.    REVIEW OF SYSTEMS     Review of Systems   Constitutional:  Positive for fatigue. Negative for appetite change, chills, fever and unexpected weight change.   HENT:  Positive for ear pain, mouth sores and sinus pressure. Negative for rhinorrhea.    Eyes:  Negative for pain and visual disturbance.   Respiratory:  Negative for cough, shortness of breath and wheezing.    Cardiovascular:  Negative for chest pain, palpitations and leg swelling.   Gastrointestinal:  Negative for abdominal pain, blood in stool, constipation, diarrhea, nausea and vomiting.   Genitourinary:  Negative for dysuria, frequency and hematuria.   Musculoskeletal:  Negative for arthralgias, joint swelling and neck stiffness.   Skin:  Negative for rash.   Neurological:  Negative for dizziness, syncope, weakness, light-headedness and headaches.   Hematological:  Does not bruise/bleed easily.       PAST MEDICAL HISTORY         Diagnosis Date    Back pain     Body mass index 50.0-59.9, adult ([facility]) 06/23/2021    Depression     Diabetes mellitus ([facility])     GERD (gastroesophageal reflux disease)      alcohol and does not use drugs.    PHYSICAL EXAM     ED TRIAGE VITALS  BP: (!) 168/87, Temp: 98.1 °F (36.7 °C), Pulse: 60, Respirations: 20, SpO2: 98 %  Physical Exam  Vitals and nursing note reviewed.   Constitutional:       Appearance: She is well-developed.   HENT:      Head: Normocephalic and atraumatic.      Right Ear: A middle ear effusion is present.      Left Ear: Tympanic membrane and ear canal normal.      Nose:      Right Sinus: Maxillary sinus tenderness present. No frontal sinus tenderness.      Left Sinus: Maxillary sinus tenderness present. No frontal sinus tenderness.      Mouth/Throat:      Mouth: Mucous membranes are moist.   Eyes:      Conjunctiva/sclera: Conjunctivae normal.   Cardiovascular:      Rate and Rhythm: Normal rate and regular rhythm.      Heart sounds: Normal heart sounds. No murmur heard.     No gallop.   Pulmonary:      Effort: Pulmonary effort is normal. No respiratory distress.      Breath sounds: Normal breath sounds. No stridor. No decreased breath sounds, wheezing, rhonchi or rales.   Chest:      Chest wall: No tenderness.   Musculoskeletal:         General: Normal range of motion.      Cervical back: Normal range of motion and neck supple.   Skin:     General: Skin is warm and dry.   Neurological:      Mental Status: She is alert and oriented to person, place, and time.         DIAGNOSTIC RESULTS   Labs:No results found for this visit on 08/24/24.    IMAGING:  No orders to display     URGENT CARE COURSE:        MDM      Patient presents to urgent care with complaint of sinus pressure, ear pain , fatigue, right neck lymph nodes swollen and pain on the roof of her mouth.  On examination, patient has pressure felt over the maxillary sinuses and along her upper line of teeth.  She also has a right middle ear effusion.  This is consistent with an acute sinusitis.  Will treat with oral Augmentin.  Will also give her a probiotic to prevent gastric symptoms. Patient follow-up with

## 2024-08-24 NOTE — DISCHARGE INSTRUCTIONS
Go to ER for worsening symptoms, chest pain, inability to keep liquids down, inability to urinate for greater than 8 hours or difficulty breathing.  Follow-up with your primary care provider.    May take Tylenol or ibuprofen as needed for pain or fever.  Increase oral fluid intake.

## 2024-08-26 ENCOUNTER — TELEPHONE (OUTPATIENT)
Dept: FAMILY MEDICINE CLINIC | Age: 49
End: 2024-08-26

## 2024-08-28 ENCOUNTER — TELEPHONE (OUTPATIENT)
Dept: FAMILY MEDICINE CLINIC | Age: 49
End: 2024-08-28

## 2024-08-28 NOTE — TELEPHONE ENCOUNTER
PA request received from pharmacy for Wegovy 0.25mg/0.5ml #2ml for 28 days.  PA submitted online at [email or web address] and pending review.      Information regarding your request  This drug/product is not covered under the pharmacy benefit. Prior Authorization is not available.     Please advise

## 2024-09-03 ENCOUNTER — PATIENT MESSAGE (OUTPATIENT)
Dept: FAMILY MEDICINE CLINIC | Age: 49
End: 2024-09-03

## 2024-09-03 DIAGNOSIS — E66.01 OBESITY, CLASS III, BMI 40-49.9 (MORBID OBESITY) (HCC): Primary | ICD-10-CM

## 2024-09-04 ENCOUNTER — HOSPITAL ENCOUNTER (OUTPATIENT)
Dept: WOMENS IMAGING | Age: 49
Discharge: HOME | End: 2024-09-04
Payer: COMMERCIAL

## 2024-09-04 DIAGNOSIS — Z12.31 BREAST CANCER SCREENING BY MAMMOGRAM: ICD-10-CM

## 2024-09-04 PROCEDURE — 77063 BREAST TOMOSYNTHESIS BI: CPT

## 2024-09-04 RX ORDER — PHENTERMINE HYDROCHLORIDE 37.5 MG/1
37.5 CAPSULE ORAL EVERY MORNING
Qty: 30 CAPSULE | Refills: 0 | Status: SHIPPED | OUTPATIENT
Start: 2024-09-04 | End: 2024-10-04

## 2024-09-12 ENCOUNTER — HOSPITAL ENCOUNTER (OUTPATIENT)
Dept: WOMENS IMAGING | Age: 49
Discharge: HOME | End: 2024-09-12
Attending: RADIOLOGY
Payer: COMMERCIAL

## 2024-09-12 DIAGNOSIS — R92.30 BREAST DENSITY: Primary | ICD-10-CM

## 2024-09-12 DIAGNOSIS — R92.8 ABNORMAL MAMMOGRAM: ICD-10-CM

## 2024-09-12 DIAGNOSIS — Z09 FOLLOW-UP EXAM: ICD-10-CM

## 2024-09-12 PROCEDURE — G0279 TOMOSYNTHESIS, MAMMO: HCPCS

## 2024-09-12 PROCEDURE — 76642 ULTRASOUND BREAST LIMITED: CPT

## 2024-10-30 ENCOUNTER — PATIENT MESSAGE (OUTPATIENT)
Dept: FAMILY MEDICINE CLINIC | Age: 49
End: 2024-10-30

## 2024-10-30 DIAGNOSIS — T75.3XXA MOTION SICKNESS, INITIAL ENCOUNTER: Primary | ICD-10-CM

## 2024-10-31 RX ORDER — SCOLOPAMINE TRANSDERMAL SYSTEM 1 MG/1
1 PATCH, EXTENDED RELEASE TRANSDERMAL
Qty: 4 PATCH | Refills: 1 | Status: SHIPPED | OUTPATIENT
Start: 2024-10-31

## 2024-11-18 ENCOUNTER — TELEMEDICINE (OUTPATIENT)
Dept: FAMILY MEDICINE CLINIC | Age: 49
End: 2024-11-18

## 2024-11-18 DIAGNOSIS — J06.9 URI WITH COUGH AND CONGESTION: Primary | ICD-10-CM

## 2024-11-18 RX ORDER — BENZONATATE 100 MG/1
100-200 CAPSULE ORAL 3 TIMES DAILY PRN
Qty: 60 CAPSULE | Refills: 0 | Status: SHIPPED | OUTPATIENT
Start: 2024-11-18 | End: 2024-11-25

## 2024-11-18 RX ORDER — PSEUDOEPHEDRINE HCL 120 MG/1
120 TABLET, FILM COATED, EXTENDED RELEASE ORAL EVERY 12 HOURS
Qty: 14 TABLET | Refills: 0 | Status: SHIPPED | OUTPATIENT
Start: 2024-11-18 | End: 2024-11-25

## 2024-11-18 ASSESSMENT — ENCOUNTER SYMPTOMS
SHORTNESS OF BREATH: 0
COUGH: 1
NAUSEA: 0
RHINORRHEA: 1
SINUS PRESSURE: 1
ABDOMINAL PAIN: 0

## 2024-11-18 NOTE — PROGRESS NOTES
Subjective:      Patient ID: Francheska Johnson is a 49 y.o. female    Cough  Associated symptoms include postnasal drip and rhinorrhea. Pertinent negatives include no chest pain, chills, fever, headaches, rash or shortness of breath.   : Acute for cough    TELEHEALTH EVALUATION -- Audio/Visual     Patient identification was verified at the start of the visit: Yes    Services were provided through a video synchronous discussion virtually to substitute for in-person clinic visit. Patient and provider were located at their individual homes.    Patient has requested an audio/video evaluation for the following concern(s):    Chief Complaint   Patient presents with    Cough       Just return home from cruise yesterday.  Onset of 2 days with cough, itchy ears, ST and sinus congestion.   Cough that is dry.   Sinus pressure.       No fever or chills.  Or body aches.  No fatigue. Denies CP, SOB or chest tightness    OTC:     No sick exposures she is aware of.     Patient Active Problem List   Diagnosis    Recurrent major depressive disorder, in full remission (HCC)       Review of Systems   Constitutional:  Negative for chills and fever.   HENT:  Positive for congestion, postnasal drip, rhinorrhea and sinus pressure.    Respiratory:  Positive for cough. Negative for shortness of breath.    Cardiovascular:  Negative for chest pain.   Gastrointestinal:  Negative for abdominal pain and nausea.   Skin:  Negative for rash.   Neurological:  Negative for dizziness, light-headedness and headaches.   Psychiatric/Behavioral: Negative.         Objective:   Physical Exam  Constitutional:       General: She is not in acute distress.     Appearance: She is not ill-appearing.   Pulmonary:      Effort: Pulmonary effort is normal. No respiratory distress.   Neurological:      Mental Status: She is alert and oriented to person, place, and time.   Psychiatric:         Mood and Affect: Mood normal.         Behavior: Behavior normal.

## 2025-02-04 DIAGNOSIS — F33.42 RECURRENT MAJOR DEPRESSIVE DISORDER, IN FULL REMISSION (HCC): ICD-10-CM

## 2025-02-04 RX ORDER — BUPROPION HYDROCHLORIDE 150 MG/1
150 TABLET ORAL EVERY MORNING
Qty: 90 TABLET | Refills: 3 | Status: SHIPPED | OUTPATIENT
Start: 2025-02-04

## 2025-02-20 ENCOUNTER — PATIENT MESSAGE (OUTPATIENT)
Dept: FAMILY MEDICINE CLINIC | Age: 50
End: 2025-02-20

## 2025-02-20 DIAGNOSIS — M79.18 MYOFASCIAL PAIN SYNDROME OF THORACIC SPINE: ICD-10-CM

## 2025-02-20 DIAGNOSIS — M79.18 MYOFASCIAL PAIN SYNDROME, CERVICAL: Primary | ICD-10-CM

## 2025-03-12 ENCOUNTER — PATIENT MESSAGE (OUTPATIENT)
Dept: FAMILY MEDICINE CLINIC | Age: 50
End: 2025-03-12

## 2025-03-12 DIAGNOSIS — M54.2 CERVICALGIA: Primary | ICD-10-CM

## 2025-03-12 RX ORDER — CYCLOBENZAPRINE HCL 10 MG
10 TABLET ORAL 3 TIMES DAILY PRN
Qty: 21 TABLET | Refills: 0 | Status: SHIPPED | OUTPATIENT
Start: 2025-03-12 | End: 2025-03-22

## 2025-03-14 ENCOUNTER — OFFICE VISIT (OUTPATIENT)
Dept: FAMILY MEDICINE CLINIC | Age: 50
End: 2025-03-14
Payer: COMMERCIAL

## 2025-03-14 VITALS
SYSTOLIC BLOOD PRESSURE: 128 MMHG | DIASTOLIC BLOOD PRESSURE: 74 MMHG | RESPIRATION RATE: 16 BRPM | WEIGHT: 264.2 LBS | HEART RATE: 72 BPM | BODY MASS INDEX: 43.63 KG/M2

## 2025-03-14 DIAGNOSIS — M54.2 CERVICALGIA: ICD-10-CM

## 2025-03-14 DIAGNOSIS — M79.18 MYOFASCIAL PAIN SYNDROME, CERVICAL: Primary | ICD-10-CM

## 2025-03-14 DIAGNOSIS — E66.01 MORBID OBESITY WITH BMI OF 40.0-44.9, ADULT: ICD-10-CM

## 2025-03-14 DIAGNOSIS — M79.18 MYOFASCIAL PAIN SYNDROME OF THORACIC SPINE: ICD-10-CM

## 2025-03-14 PROCEDURE — 99214 OFFICE O/P EST MOD 30 MIN: CPT | Performed by: NURSE PRACTITIONER

## 2025-03-14 PROCEDURE — G2211 COMPLEX E/M VISIT ADD ON: HCPCS | Performed by: NURSE PRACTITIONER

## 2025-03-14 RX ORDER — MELOXICAM 15 MG/1
15 TABLET ORAL DAILY
Qty: 30 TABLET | Refills: 0 | Status: SHIPPED | OUTPATIENT
Start: 2025-03-14

## 2025-03-14 RX ORDER — PHENTERMINE HYDROCHLORIDE 37.5 MG/1
37.5 TABLET ORAL
Qty: 30 TABLET | Refills: 0 | Status: SHIPPED | OUTPATIENT
Start: 2025-03-14 | End: 2025-04-13

## 2025-03-14 RX ORDER — METAXALONE 800 MG/1
800 TABLET ORAL 3 TIMES DAILY
Qty: 30 TABLET | Refills: 1 | Status: SHIPPED | OUTPATIENT
Start: 2025-03-14 | End: 2025-04-03

## 2025-03-14 SDOH — ECONOMIC STABILITY: FOOD INSECURITY: WITHIN THE PAST 12 MONTHS, THE FOOD YOU BOUGHT JUST DIDN'T LAST AND YOU DIDN'T HAVE MONEY TO GET MORE.: NEVER TRUE

## 2025-03-14 SDOH — ECONOMIC STABILITY: FOOD INSECURITY: WITHIN THE PAST 12 MONTHS, YOU WORRIED THAT YOUR FOOD WOULD RUN OUT BEFORE YOU GOT MONEY TO BUY MORE.: NEVER TRUE

## 2025-03-14 ASSESSMENT — PATIENT HEALTH QUESTIONNAIRE - PHQ9
7. TROUBLE CONCENTRATING ON THINGS, SUCH AS READING THE NEWSPAPER OR WATCHING TELEVISION: NOT AT ALL
SUM OF ALL RESPONSES TO PHQ QUESTIONS 1-9: 1
4. FEELING TIRED OR HAVING LITTLE ENERGY: NOT AT ALL
8. MOVING OR SPEAKING SO SLOWLY THAT OTHER PEOPLE COULD HAVE NOTICED. OR THE OPPOSITE, BEING SO FIGETY OR RESTLESS THAT YOU HAVE BEEN MOVING AROUND A LOT MORE THAN USUAL: NOT AT ALL
SUM OF ALL RESPONSES TO PHQ QUESTIONS 1-9: 1
SUM OF ALL RESPONSES TO PHQ QUESTIONS 1-9: 1
10. IF YOU CHECKED OFF ANY PROBLEMS, HOW DIFFICULT HAVE THESE PROBLEMS MADE IT FOR YOU TO DO YOUR WORK, TAKE CARE OF THINGS AT HOME, OR GET ALONG WITH OTHER PEOPLE: NOT DIFFICULT AT ALL
2. FEELING DOWN, DEPRESSED OR HOPELESS: NOT AT ALL
3. TROUBLE FALLING OR STAYING ASLEEP: SEVERAL DAYS
6. FEELING BAD ABOUT YOURSELF - OR THAT YOU ARE A FAILURE OR HAVE LET YOURSELF OR YOUR FAMILY DOWN: NOT AT ALL
5. POOR APPETITE OR OVEREATING: NOT AT ALL
9. THOUGHTS THAT YOU WOULD BE BETTER OFF DEAD, OR OF HURTING YOURSELF: NOT AT ALL
1. LITTLE INTEREST OR PLEASURE IN DOING THINGS: NOT AT ALL
SUM OF ALL RESPONSES TO PHQ QUESTIONS 1-9: 1

## 2025-03-14 ASSESSMENT — ENCOUNTER SYMPTOMS
ABDOMINAL PAIN: 0
COUGH: 0
NAUSEA: 0
SHORTNESS OF BREATH: 0

## 2025-03-14 NOTE — PROGRESS NOTES
Francheska Johnson (1975) 49 y.o. female here for evaluation of the following chief complaint(s):      HPI:  Chief Complaint   Patient presents with    Muscle Pain     Neck started in September, seen chiropractic, massage therapy and dry needling without improvement.  Unable to do PT due to cost    Spasms       Been having some neck issues that have been bothering since about October and nothing seems to help.  Done the VORI program thru [facility] and  met with a physical therapist that showed me some stretches to try, but it didn't really help.  It's upper back and neck. Stiffness, painful, very tight muscles.  Denies shooting pain down arms, numbness/tingling or weakness    Things tried: special neck pillow, massage, chiropractic adjustments, dry needling and topical creams.      Flexeril was prescribed 3 days ago with benefit at night.  Makes her drowsy.  Neck not as tight.     Vitals:    03/14/25 0819   BP: 128/74   Pulse: 72   Resp: 16       In menopause.  Weight gain.  Benefit with Adipex but had some SE constipation and worsening hot flashes    Wt Readings from Last 3 Encounters:   03/14/25 119.8 kg (264 lb 3.2 oz)   08/24/24 116.1 kg (256 lb)   08/22/24 116.9 kg (257 lb 12.8 oz)     Body mass index is 43.63 kg/m².      Patient Active Problem List   Diagnosis    Recurrent major depressive disorder, in full remission       SUBJECTIVE/OBJECTIVE:  Review of Systems   Constitutional:  Negative for chills and fever.   HENT: Negative.     Respiratory:  Negative for cough and shortness of breath.    Cardiovascular:  Negative for chest pain.   Gastrointestinal:  Negative for abdominal pain and nausea.   Musculoskeletal:  Positive for neck pain and neck stiffness.   Skin:  Negative for rash.   Neurological:  Negative for dizziness, weakness, light-headedness, numbness and headaches.   Psychiatric/Behavioral: Negative.         Physical Exam  Constitutional:       General: She is not in acute distress.  Eyes:

## 2025-04-22 ENCOUNTER — COMMUNITY OUTREACH (OUTPATIENT)
Dept: FAMILY MEDICINE CLINIC | Age: 50
End: 2025-04-22

## 2025-04-22 NOTE — PROGRESS NOTES
Patient's HM shows they are overdue for Colorectal Screening.   Care Everywhere and  files searched.  No results to attach to order nor HM updated.       
Diabetes

## 2025-05-03 ENCOUNTER — HOSPITAL ENCOUNTER (EMERGENCY)
Age: 50
Discharge: HOME | End: 2025-05-03
Payer: COMMERCIAL

## 2025-05-03 VITALS
OXYGEN SATURATION: 97 % | RESPIRATION RATE: 20 BRPM | DIASTOLIC BLOOD PRESSURE: 82 MMHG | HEART RATE: 66 BPM | SYSTOLIC BLOOD PRESSURE: 154 MMHG | TEMPERATURE: 97.9 F

## 2025-05-03 DIAGNOSIS — J02.9 ACUTE PHARYNGITIS, UNSPECIFIED ETIOLOGY: Primary | ICD-10-CM

## 2025-05-03 PROCEDURE — 99213 OFFICE O/P EST LOW 20 MIN: CPT

## 2025-05-03 PROCEDURE — 99213 OFFICE O/P EST LOW 20 MIN: CPT | Performed by: NURSE PRACTITIONER

## 2025-05-03 RX ORDER — PREDNISONE 20 MG/1
40 TABLET ORAL DAILY
Qty: 10 TABLET | Refills: 0 | Status: SHIPPED | OUTPATIENT
Start: 2025-05-03 | End: 2025-05-08

## 2025-05-03 ASSESSMENT — ENCOUNTER SYMPTOMS: SORE THROAT: 1

## 2025-05-03 ASSESSMENT — PAIN SCALES - GENERAL: PAINLEVEL_OUTOF10: 3

## 2025-05-03 ASSESSMENT — PAIN DESCRIPTION - LOCATION: LOCATION: EAR;THROAT

## 2025-05-03 NOTE — ED PROVIDER NOTES
[facility] URGENT CARE  UrgentCare Encounter      CHIEFCOMPLAINT       Chief Complaint   Patient presents with    Pharyngitis    Ear Pain       Nurses Notes reviewed and I agree except as noted in the HPI.  HISTORY OF PRESENT ILLNESS   Francheska Johnson is a 49 y.o. female who presents to urgent care with complaints of sore throat, ear pain.  Symptom onset was 3 days ago.  She has had some chills.  Rates the pain 8 out of  when swallowing.    REVIEW OF SYSTEMS     Review of Systems   HENT:  Positive for congestion, postnasal drip and sore throat.        PAST MEDICAL HISTORY         Diagnosis Date    Back pain     Body mass index 50.0-59.9, adult ([facility]) 06/23/2021    Depression     Diabetes mellitus ([facility])     GERD (gastroesophageal reflux disease)     Hypercholesteremia     Hypertension     UTI (urinary tract infection)        SURGICAL HISTORY     Patient  has a past surgical history that includes Endometrial ablation; other surgical history; Cholecystectomy; HYSTERECTOMY ABDOMINAL (N/A, 12/12/2017); ovarian cyst removal; Upper gastrointestinal endoscopy (N/A, 02/09/2021); Colonoscopy; Sleeve Gastrectomy (N/A, 10/11/2021); and Hysterectomy (12/12/2017).    CURRENT MEDICATIONS       Discharge Medication List as of 5/3/2025 11:25 AM        CONTINUE these medications which have NOT CHANGED    Details   meloxicam (MOBIC) 15 MG tablet Take 1 tablet by mouth daily, Disp-30 tablet, R-0Normal      FLUoxetine (PROZAC) 20 MG capsule TAKE 3 CAPSULES BY MOUTH ONCE DAILY., Disp-270 capsule, R-3Normal      buPROPion (WELLBUTRIN XL) 150 MG extended release tablet Take 1 tablet by mouth every morning, Disp-90 tablet, R-3Normal      diphenhydrAMINE (BENADRYL) 25 MG tablet Take 1 tablet by mouth nightly as needed for ItchingHistorical Med      docusate sodium (COLACE) 100 MG capsule Take 1 capsule by mouth 2 times dailyHistorical Med      Cholecalciferol (VITAMIN D3) 1.25 MG (96231 UT) CAPS Take 1 capsule by mouth dailyHistorical  Med      Biotin 99187 MCG TABS Take by mouthHistorical Med      Multiple Vitamins-Minerals (THERAPEUTIC MULTIVITAMIN-MINERALS) tablet Take 1 tablet by mouth daily Womans 1 a day OTCHistorical Med      calcium citrate (CALCITRATE) 250 MG TABS tablet Take 2 tablets by mouth 2 times dailyHistorical Med             ALLERGIES     Patient is has no known allergies.    FAMILY HISTORY     Patient'sfamily history includes Breast Cancer in her maternal aunt, maternal aunt, maternal aunt, and maternal aunt; Breast Cancer (age of onset: 50 - 59) in her maternal cousin; Cancer in her maternal grandfather and maternal grandmother; Heart Disease in her father; High Blood Pressure in her father; No Known Problems in her brother and sister; Obesity in her mother.    SOCIAL HISTORY     Patient  reports that she has never smoked. She has never been exposed to tobacco smoke. She has never used smokeless tobacco. She reports that she does not drink alcohol and does not use drugs.    PHYSICAL EXAM     ED TRIAGE VITALS  BP: (!) 154/82, Temp: 97.9 °F (36.6 °C), Pulse: 66, Respirations: 20, SpO2: 97 %  Physical Exam  Vitals and nursing note reviewed.   Constitutional:       General: She is not in acute distress.     Appearance: Normal appearance. She is not ill-appearing or toxic-appearing.   HENT:      Head: Normocephalic and atraumatic.      Nose: No congestion or rhinorrhea.      Mouth/Throat:      Mouth: Mucous membranes are moist.      Pharynx: Oropharynx is clear. Uvula midline. Posterior oropharyngeal erythema and uvula swelling present.      Comments: Significant erythema to uvula and postpharyngeal area.  Mild swelling to the uvula.  No uvular shift.  Eyes:      Extraocular Movements: Extraocular movements intact.      Conjunctiva/sclera: Conjunctivae normal.      Pupils: Pupils are equal, round, and reactive to light.   Cardiovascular:      Rate and Rhythm: Normal rate and regular rhythm.      Pulses: Normal pulses.      Heart

## 2025-05-12 ENCOUNTER — TELEPHONE (OUTPATIENT)
Dept: FAMILY MEDICINE CLINIC | Age: 50
End: 2025-05-12

## 2025-05-12 NOTE — TELEPHONE ENCOUNTER
Letter received from [facility] that patient is due for 6 month diagnostic mammogram of left breast.    I spoke with patient and she is declining to schedule at this time due to finances. She is aware and will call to schedule if able.

## 2025-05-22 DIAGNOSIS — F33.42 RECURRENT MAJOR DEPRESSIVE DISORDER, IN FULL REMISSION: ICD-10-CM

## 2025-05-22 RX ORDER — BUPROPION HYDROCHLORIDE 150 MG/1
150 TABLET ORAL EVERY MORNING
Qty: 90 TABLET | Refills: 3 | Status: SHIPPED | OUTPATIENT
Start: 2025-05-22

## 2025-05-27 LAB
CHOLEST SERPL-MCNC: 242 MG/DL (ref 0–199)
FASTING: YES
GLUCOSE SERPL-MCNC: 123 MG/DL (ref 74–109)
HDLC SERPL-MCNC: 57 MG/DL (ref 40–90)
LDLC SERPL CALC-MCNC: 142 MG/DL
TRIGL SERPL-MCNC: 213 MG/DL (ref 0–199)

## 2025-05-30 ENCOUNTER — PATIENT MESSAGE (OUTPATIENT)
Dept: FAMILY MEDICINE CLINIC | Age: 50
End: 2025-05-30

## 2025-05-30 DIAGNOSIS — M79.646 PAIN OF FINGER, UNSPECIFIED LATERALITY: Primary | ICD-10-CM

## 2025-05-30 RX ORDER — PREDNISONE 50 MG/1
50 TABLET ORAL DAILY
Qty: 4 TABLET | Refills: 0 | Status: SHIPPED | OUTPATIENT
Start: 2025-05-30 | End: 2025-06-03

## 2025-06-02 ENCOUNTER — PATIENT MESSAGE (OUTPATIENT)
Dept: FAMILY MEDICINE CLINIC | Age: 50
End: 2025-06-02

## 2025-06-02 DIAGNOSIS — E66.01 MORBID OBESITY WITH BMI OF 40.0-44.9, ADULT (HCC): ICD-10-CM

## 2025-06-02 DIAGNOSIS — R73.03 PREDIABETES: Primary | ICD-10-CM

## 2025-06-03 ENCOUNTER — TELEPHONE (OUTPATIENT)
Dept: FAMILY MEDICINE CLINIC | Age: 50
End: 2025-06-03

## 2025-06-03 NOTE — TELEPHONE ENCOUNTER
PA request received from pharmacy for Mounjaro 2.5mg/0.5ml #2ml for 28 days.  PA submitted online at [email or web address] and pending review.

## 2025-06-05 NOTE — TELEPHONE ENCOUNTER
Message from [employer]  This request has not been approved. Based on the information we received, you did not meet the specific rule(s) needed to approve this request. In some cases, the requested drug or alternatives offered may have other guideline rules that need to be met. Our guideline named GLP-1 AGONIST (Bydureon BCise, Byetta, Mounjaro, Ozempic, Rybelsus, Trulicity, Victoza [liraglutide]) requires the following rule(s) be met for approval: A. You have type 2 diabetes (a disorder with high blood sugar). B. Your diagnosis of type 2 diabetes is confirmed by medical records OR chart notes.This is why your request is denied. Please work with your doctor to use a different medication or get us more information if it will allow us to approve this request. A written notification letter will follow with additional details.    Please advise.  Letter scanned in media

## 2025-07-30 ENCOUNTER — PATIENT MESSAGE (OUTPATIENT)
Dept: FAMILY MEDICINE CLINIC | Age: 50
End: 2025-07-30

## 2025-07-30 DIAGNOSIS — E66.01 MORBID OBESITY WITH BMI OF 40.0-44.9, ADULT (HCC): Primary | ICD-10-CM

## 2025-08-04 ENCOUNTER — TELEPHONE (OUTPATIENT)
Dept: FAMILY MEDICINE CLINIC | Age: 50
End: 2025-08-04

## 2025-08-04 RX ORDER — NALTREXONE HYDROCHLORIDE AND BUPROPION HYDROCHLORIDE 8; 90 MG/1; MG/1
2 TABLET, EXTENDED RELEASE ORAL 2 TIMES DAILY
Qty: 120 TABLET | Refills: 0 | Status: SHIPPED | OUTPATIENT
Start: 2025-08-04 | End: 2025-08-06 | Stop reason: SDUPTHER

## 2025-08-06 RX ORDER — NALTREXONE HYDROCHLORIDE AND BUPROPION HYDROCHLORIDE 8; 90 MG/1; MG/1
2 TABLET, EXTENDED RELEASE ORAL 2 TIMES DAILY
Qty: 120 TABLET | Refills: 0 | Status: SHIPPED | OUTPATIENT
Start: 2025-08-06

## 2025-08-11 ENCOUNTER — PATIENT MESSAGE (OUTPATIENT)
Dept: FAMILY MEDICINE CLINIC | Age: 50
End: 2025-08-11

## 2025-08-11 DIAGNOSIS — E66.01 MORBID OBESITY WITH BMI OF 40.0-44.9, ADULT (HCC): ICD-10-CM

## 2025-08-11 RX ORDER — NALTREXONE HYDROCHLORIDE AND BUPROPION HYDROCHLORIDE 8; 90 MG/1; MG/1
2 TABLET, EXTENDED RELEASE ORAL 2 TIMES DAILY
Qty: 120 TABLET | Refills: 2 | Status: SHIPPED | OUTPATIENT
Start: 2025-08-11

## (undated) DEVICE — TUBING IV STOPCOCK 48 CM 3 W

## (undated) DEVICE — GOWN,SIRUS,NONRNF,SETINSLV,XL,20/CS: Brand: MEDLINE

## (undated) DEVICE — COLUMN DRAPE

## (undated) DEVICE — SUTURE ETHBND EXCEL SZ 0 L30IN NONABSORBABLE GRN L26MM SH X834H

## (undated) DEVICE — SOLUTION IV 1000ML LAC RINGERS PH 6.5 INJ USP VIAFLX PLAS

## (undated) DEVICE — TROCAR: Brand: KII FIOS FIRST ENTRY

## (undated) DEVICE — STAPLE INT BIOABSORBABLE REINF LN SUREFORM 60 GRN SEAMGRD

## (undated) DEVICE — SUTURE PERMA-HAND SZ 3-0 L30IN NONABSORBABLE BLK L60MM KS 622H

## (undated) DEVICE — STAPLER 60 RELOAD GREEN: Brand: SUREFORM

## (undated) DEVICE — APPLICATOR MEDICATED 26 CC SOLUTION HI LT ORNG CHLORAPREP

## (undated) DEVICE — SEAL

## (undated) DEVICE — SUTURE VCRL + SZ 0 L27IN ABSRB VLT L26MM UR-6 5/8 CIR VCP603H

## (undated) DEVICE — GOWN,SIRUS,NON REINFRCD,LARGE,SET IN SL: Brand: MEDLINE

## (undated) DEVICE — SUTURE V-LOC 180 SZ 2-0 L12IN ABSRB VLT GS-21 L37MM 1/2 CIR VLOCM0315

## (undated) DEVICE — 35 ML SYRINGE LUER-LOCK TIP: Brand: MONOJECT

## (undated) DEVICE — INTENDED FOR TISSUE SEPARATION, AND OTHER PROCEDURES THAT REQUIRE A SHARP SURGICAL BLADE TO PUNCTURE OR CUT.: Brand: BARD-PARKER ® CARBON RIB-BACK BLADES

## (undated) DEVICE — AIRSEAL 12 MM ACCESS PORT AND PALM GRIP OBTURATOR WITH BLADELESS OPTICAL TIP, 120 MM LENGTH: Brand: AIRSEAL

## (undated) DEVICE — CANNULA SEAL

## (undated) DEVICE — TUBING INSUFFLATOR HEAT HUMIDIFIED SMK EVAC SET PNEUMOCLEAR

## (undated) DEVICE — IV START KIT: Brand: MEDLINE INDUSTRIES, INC.

## (undated) DEVICE — DRESSING GRMCDL 6 12FR D1N CNTR HOLE 4MM ANTMCRBL PRTCTVE DI

## (undated) DEVICE — PACK PROCEDURE SURG GYN ROBOTIC

## (undated) DEVICE — BANDAGE ADH W1XL3IN NAT FAB WVN FLX DURABLE N ADH PD SEAL

## (undated) DEVICE — SUTURE VCRL + SZ 4-0 L27IN ABSRB WHT FS-2 3/8 CIR REV CUT VCP422H

## (undated) DEVICE — STAPLER 60: Brand: SUREFORM

## (undated) DEVICE — SET ADMIN 25ML L117IN PMP MOD CK VLV RLER CLMP 2 SMRTSITE

## (undated) DEVICE — PENCIL SMK EVAC ALL IN 1 DSGN ENH VISIBILITY IMPROVED AIR

## (undated) DEVICE — AIRSEAL 12MM ACCESS PORT AND OBTURATOR WITH BLADELESS OPTICAL TIP, 150MM LENGTH: Brand: AIRSEAL

## (undated) DEVICE — VISIGI 3D®  CALIBRATION SYSTEM  SIZE 36FR STD W/ BULB: Brand: BOEHRINGER® VISIGI 3D™ SLEEVE GASTRECTOMY CALIBRATION SYSTEM, SIZE 36FR W/BULB

## (undated) DEVICE — SYRINGE MED 10ML LUERLOCK TIP W/O SFTY DISP

## (undated) DEVICE — SOLUTION ANTIFOG VIS SYS CLEARIFY LAPSCP

## (undated) DEVICE — GLOVE ORANGE PI 8   MSG9080

## (undated) DEVICE — STAPLER 60 RELOAD BLACK: Brand: SUREFORM

## (undated) DEVICE — STRIP,CLOSURE,WOUND,MEDI-STRIP,1/2X4: Brand: MEDLINE

## (undated) DEVICE — OBTURATOR ROBOTIC DIA8MM BLDELSS ENDOSCP DISP DA VINCI SI

## (undated) DEVICE — GLOVE ORANGE PI 7   MSG9070

## (undated) DEVICE — SOLUTION IV 1000ML 0.45% SOD CHL PH 5 INJ USP VIAFLX PLAS

## (undated) DEVICE — KIT INF CTRL 2OZ LUB TBNG L12FT DBL END BRSH SYR OP4

## (undated) DEVICE — HYPODERMIC SAFETY NEEDLE: Brand: MAGELLAN

## (undated) DEVICE — APPLICATOR SURG XL L38CM FOR ARISTA ABSRB HEMSTAT FLEXITIP

## (undated) DEVICE — ELECTRO LUBE IS A SINGLE PATIENT USE DEVICE THAT IS INTENDED TO BE USED ON ELECTROSURGICAL ELECTRODES TO REDUCE STICKING.: Brand: KEY SURGICAL ELECTRO LUBE

## (undated) DEVICE — 3M™ BAIR HUGGER™ MULTI-POSITION UPPER BODY BLANKET, 10 PER CASE, 62200: Brand: BAIR HUGGER™

## (undated) DEVICE — PACK PROCEDURE SURG SET UP SRMC

## (undated) DEVICE — SYRINGE BULB 50/CS 48/PLT: Brand: MEDEGEN MEDICAL PRODUCTS, LLC

## (undated) DEVICE — TRI-LUMEN FILTERED TUBE SET WITH ACTIVATED CHARCOAL FILTER: Brand: AIRSEAL

## (undated) DEVICE — KIT DRP 3 ARM ACC DISP ENDOWRIST DA VINCI SI

## (undated) DEVICE — AGENT HEMSTAT 3GM PURIFIED PLNT STARCH PWD ABSRB ARISTA AH

## (undated) DEVICE — MEDI-VAC NON-CONDUCTIVE SUCTION TUBING 6MM X 6.1M (20 FT.) L: Brand: CARDINAL HEALTH

## (undated) DEVICE — 3M™ STERI-STRIP™ BLEND TONE SKIN CLOSURES, B1557, TAN, 1/2 IN X 4 IN (12MM X 100MM), 6 STRIPS/ENVELOPE: Brand: 3M™ STERI-STRIP™

## (undated) DEVICE — BASIC SINGLE BASIN BTC-LF: Brand: MEDLINE INDUSTRIES, INC.

## (undated) DEVICE — TIP COVER ACCESSORY

## (undated) DEVICE — SET LNR RED GRN W/ BASE CLEANASCOPE

## (undated) DEVICE — PACK-MAJOR

## (undated) DEVICE — GLOVE ORTHO 8   MSG9480

## (undated) DEVICE — PUMP SUC IRR TBNG L10FT W/ HNDPC ASSEMB STRYKEFLOW 2

## (undated) DEVICE — CONMED SCOPE SAVER BITE BLOCK, 20X27 MM: Brand: SCOPE SAVER

## (undated) DEVICE — 4-PORT MANIFOLD: Brand: NEPTUNE 2

## (undated) DEVICE — PACK,UNIVERSAL,NO GOWNS: Brand: MEDLINE

## (undated) DEVICE — SOLUTION IV 500ML 0.9% SOD CHL PH 5 INJ USP VIAFLX PLAS

## (undated) DEVICE — 3M™ STERI-STRIP™ COMPOUND BENZOIN TINCTURE 40 BAGS/CARTON 4 CARTONS/CASE C1544: Brand: 3M™ STERI-STRIP™

## (undated) DEVICE — 2000CC GUARDIAN II: Brand: GUARDIAN

## (undated) DEVICE — BLADELESS OBTURATOR: Brand: WECK VISTA

## (undated) DEVICE — REDUCER: Brand: ENDOWRIST

## (undated) DEVICE — GAUZE,SPONGE,8"X4",12PLY,XRAY,STRL,LF: Brand: MEDLINE

## (undated) DEVICE — UNIVERSAL MONOPOLAR LAPAROSCOPIC CABLE 10FT, 4MM PIN CONNECTOR: Brand: CONMED

## (undated) DEVICE — ARM DRAPE

## (undated) DEVICE — TETRA-FLEX CF WOVEN LATEX FREE ELASTIC BANDAGE 6" X 5.5 YD: Brand: TETRA-FLEX™CF

## (undated) DEVICE — SOLUTION IV IRRIG WATER 500ML POUR BRL ST 2F7113

## (undated) DEVICE — VESSEL SEALER EXTEND: Brand: ENDOWRIST

## (undated) DEVICE — CATHETER ETER IV 20GA L1IN POLYUR STR RADPQ INTROCAN SFTY